# Patient Record
Sex: FEMALE | Race: WHITE | NOT HISPANIC OR LATINO | Employment: OTHER | URBAN - METROPOLITAN AREA
[De-identification: names, ages, dates, MRNs, and addresses within clinical notes are randomized per-mention and may not be internally consistent; named-entity substitution may affect disease eponyms.]

---

## 2017-03-13 ENCOUNTER — PATIENT OUTREACH (OUTPATIENT)
Dept: ADMINISTRATIVE | Facility: HOSPITAL | Age: 62
End: 2017-03-13

## 2017-03-13 RX ORDER — BLOOD-GLUCOSE METER
KIT MISCELLANEOUS
Qty: 100 EACH | Refills: 0 | OUTPATIENT
Start: 2017-03-13

## 2017-07-25 ENCOUNTER — OFFICE VISIT (OUTPATIENT)
Dept: INTERNAL MEDICINE | Facility: CLINIC | Age: 62
End: 2017-07-25
Payer: COMMERCIAL

## 2017-07-25 DIAGNOSIS — E11.9 TYPE 2 DIABETES MELLITUS WITHOUT COMPLICATION, WITHOUT LONG-TERM CURRENT USE OF INSULIN: ICD-10-CM

## 2017-07-25 DIAGNOSIS — I10 ESSENTIAL HYPERTENSION: Primary | ICD-10-CM

## 2017-07-25 PROCEDURE — 4010F ACE/ARB THERAPY RXD/TAKEN: CPT | Mod: S$GLB,,, | Performed by: INTERNAL MEDICINE

## 2017-07-25 PROCEDURE — 99214 OFFICE O/P EST MOD 30 MIN: CPT | Mod: S$GLB,,, | Performed by: INTERNAL MEDICINE

## 2017-07-25 PROCEDURE — 99999 PR PBB SHADOW E&M-EST. PATIENT-LVL III: CPT | Mod: PBBFAC,,, | Performed by: INTERNAL MEDICINE

## 2017-07-25 RX ORDER — CARVEDILOL 12.5 MG/1
12.5 TABLET ORAL 2 TIMES DAILY WITH MEALS
Qty: 60 TABLET | Refills: 6 | Status: SHIPPED | OUTPATIENT
Start: 2017-07-25 | End: 2018-02-21 | Stop reason: SDUPTHER

## 2017-07-26 VITALS
DIASTOLIC BLOOD PRESSURE: 78 MMHG | HEART RATE: 75 BPM | WEIGHT: 264.56 LBS | HEIGHT: 67 IN | BODY MASS INDEX: 41.52 KG/M2 | SYSTOLIC BLOOD PRESSURE: 145 MMHG

## 2017-07-26 NOTE — PROGRESS NOTES
Answers for HPI/ROS submitted by the patient on 7/23/2017   Hypertension  Chronicity: recurrent  Onset: more than 1 year ago  Progression since onset: rapidly improving  Condition status: controlled  anxiety: Yes  blurred vision: No  chest pain: No  headaches: No  malaise/fatigue: Yes  neck pain: No  orthopnea: No  palpitations: No  peripheral edema: No  PND: No  shortness of breath: No  sweats: Yes  Agents associated with hypertension: decongestants, NSAIDs  CAD risks: diabetes mellitus, obesity  Compliance problems: diet, exercise  Improvement on treatment: moderate

## 2017-07-26 NOTE — PROGRESS NOTES
"Subjective:       Patient ID: Maria Alejandra De La Rosa is a 62 y.o. female.    Chief Complaint: Medication Refill   HPI   This is a 62-year-old who presents today for medication refill. She is new to me and reports made appt because she was out of medication.  Patient has been followed by Dr. Bae but then reports her insurance changed and she went elsewhere she recently switched insurance again and [facility] wanted to come in to get a refill of her medications.  Patient reports that she was out of some of her medications but went to see an endocrinologist a few days ago and her medications were filled at that time.except for her carvedilol  She went to see an outlying endocrinologist Luz Marina Marin NP for her diabetes and reports her medications were refilled at that time she has had only been taking her Coreg once daily so she needs a refill of that the rest were recently filled.  She  reports in general her diabetes has been controlled and she takes 1 metformin because she was having trouble with diarrhea patient reports she had recent blood work and will try to have this sent she would rather do that then repeat her blood work at this time she's had it about a week ago.    Review of Systems   Respiratory: Negative for shortness of breath.    Cardiovascular: Negative for chest pain and palpitations.   Musculoskeletal: Negative for neck pain.   Neurological: Negative for headaches.       Objective:     Blood pressure (!) 145/78, pulse 62, height 5' 7" (1.702 m), weight 120 kg (264 lb 8.8 oz).    Physical Exam   Constitutional: No distress.   HENT:   Head: Normocephalic.   Mouth/Throat: Oropharynx is clear and moist.   Eyes: No scleral icterus.   Neck: Neck supple.   Cardiovascular: Normal rate, regular rhythm and normal heart sounds.  Exam reveals no gallop and no friction rub.    No murmur heard.  Pulmonary/Chest: Effort normal and breath sounds normal. No respiratory distress.   Abdominal: Soft. " Bowel sounds are normal. She exhibits no mass. There is no tenderness.   Musculoskeletal: She exhibits no edema.   Feet:   Right Foot:   Protective Sensation: 7 sites tested. 7 sites sensed.   Left Foot:   Protective Sensation: 7 sites tested. 7 sites sensed.   Neurological: She is alert.   Skin: No erythema.   Psychiatric: She has a normal mood and affect.   Vitals reviewed.      Assessment:       1. Essential hypertension    2. Type 2 diabetes mellitus without complication, without long-term current use of insulin        Plan:       Maria Alejandra was seen today for medication refill.    Diagnoses and all orders for this visit:    Essential hypertension  She is on losartan hydrochlorothiazide and Carvedilol  Discussed taking her carvedilol twice daily as prescribed    Type 2 diabetes mellitus without complication, without long-term current use of insulin  She has recently seen her outlying endocrinologist robi santos  And reports her readings were good she will have her labs sent for review       -     carvedilol (COREG) 12.5 MG tablet; Take 1 tablet (12.5 mg total) by mouth 2 (two) times daily with meals  She has her other refills she reports    She will return for physical to establish 3-4 months bring meds at that time for review .  She reports upcoming gyn appt with her conogylogist dr. Garrison at   And follows with outlying optho she will send records for review

## 2017-07-31 DIAGNOSIS — Z12.31 OTHER SCREENING MAMMOGRAM: ICD-10-CM

## 2017-08-04 RX ORDER — LOSARTAN POTASSIUM 100 MG/1
100 TABLET ORAL DAILY
Qty: 90 TABLET | Refills: 1 | Status: SHIPPED | OUTPATIENT
Start: 2017-08-04 | End: 2018-02-01 | Stop reason: SDUPTHER

## 2017-08-04 NOTE — TELEPHONE ENCOUNTER
----- Message from Selena Hamilton sent at 8/4/2017 12:25 PM CDT -----  Contact: self/[phone]  Pt called in regards to getting a Rx for losartan (COZAAR) 100 MG tablet. It was given to her by the Np. She would like to get it from her primary.      [facility] pharmacy\  Please advise

## 2017-08-23 ENCOUNTER — PATIENT MESSAGE (OUTPATIENT)
Dept: ADMINISTRATIVE | Facility: HOSPITAL | Age: 62
End: 2017-08-23

## 2017-11-27 DIAGNOSIS — Z13.5 SCREENING FOR DIABETIC RETINOPATHY: Primary | ICD-10-CM

## 2017-11-28 ENCOUNTER — OFFICE VISIT (OUTPATIENT)
Dept: INTERNAL MEDICINE | Facility: CLINIC | Age: 62
End: 2017-11-28
Payer: COMMERCIAL

## 2017-11-28 VITALS
SYSTOLIC BLOOD PRESSURE: 140 MMHG | DIASTOLIC BLOOD PRESSURE: 78 MMHG | BODY MASS INDEX: 43.15 KG/M2 | HEART RATE: 72 BPM | HEIGHT: 66 IN | WEIGHT: 268.5 LBS

## 2017-11-28 DIAGNOSIS — E11.9 TYPE 2 DIABETES MELLITUS WITHOUT COMPLICATION, WITHOUT LONG-TERM CURRENT USE OF INSULIN: ICD-10-CM

## 2017-11-28 DIAGNOSIS — Z11.59 NEED FOR HEPATITIS C SCREENING TEST: ICD-10-CM

## 2017-11-28 DIAGNOSIS — I10 ESSENTIAL HYPERTENSION: ICD-10-CM

## 2017-11-28 DIAGNOSIS — F32.A DEPRESSION, UNSPECIFIED DEPRESSION TYPE: ICD-10-CM

## 2017-11-28 DIAGNOSIS — Z00.00 ANNUAL PHYSICAL EXAM: Primary | ICD-10-CM

## 2017-11-28 PROCEDURE — 99396 PREV VISIT EST AGE 40-64: CPT | Mod: S$GLB,,, | Performed by: INTERNAL MEDICINE

## 2017-11-28 PROCEDURE — 99999 PR PBB SHADOW E&M-EST. PATIENT-LVL III: CPT | Mod: PBBFAC,,, | Performed by: INTERNAL MEDICINE

## 2017-11-28 RX ORDER — HYDROCHLOROTHIAZIDE 25 MG/1
25 TABLET ORAL DAILY
Qty: 90 TABLET | Refills: 3 | Status: SHIPPED | OUTPATIENT
Start: 2017-11-28 | End: 2018-02-21 | Stop reason: SDUPTHER

## 2017-11-28 RX ORDER — LINAGLIPTIN 5 MG/1
TABLET, FILM COATED ORAL
COMMUNITY
Start: 2017-11-16 | End: 2018-02-01 | Stop reason: SDUPTHER

## 2017-11-28 RX ORDER — MULTIVITAMIN
1 TABLET ORAL DAILY
COMMUNITY
End: 2018-02-01

## 2017-11-28 RX ORDER — CHOLECALCIFEROL (VITAMIN D3) 25 MCG
1000 TABLET ORAL DAILY
COMMUNITY

## 2017-11-28 NOTE — PROGRESS NOTES
Answers for HPI/ROS submitted by the patient on 11/26/2017   activity change: No  unexpected weight change: No  neck pain: No  hearing loss: No  rhinorrhea: No  trouble swallowing: No  eye discharge: No  visual disturbance: No  chest tightness: No  wheezing: No  chest pain: No  palpitations: No  blood in stool: No  constipation: No  vomiting: No  diarrhea: No  polydipsia: No  polyuria: No  difficulty urinating: No  hematuria: No  menstrual problem: No  dysuria: No  joint swelling: No  arthralgias: No  headaches: Yes  weakness: No  confusion: No  dysphoric mood: Yes

## 2017-11-28 NOTE — PROGRESS NOTES
Subjective:       Patient ID: Maria Alejandra De La Rosa is a 62 y.o. female.    Chief Complaint: Annual Exam   this is a 62-year-old who presents today for physical.  Patient reports that she has been doing fairly well she is following with outlying endocrinologist recently for her diabetes but reports she probably won't do that any longer she also has a thyroid doctor that she sees Dr. Rodrigues who placed her on some medications such as vitamin D and a multivitamin and reports she has been feeling better a bit more energy over times.  She was having difficulty with diarrhea with metformin her endocrinologist recently switched her to Cogentin which she is tolerating without difficulty and feels her blood sugars have been pretty good.  She brought her machine that she plans to use to start a more regular exercise program in the near future.  Patient has a history of depression chart shows bipolar she had seen psychiatry in the past but reports got off medications she may feel like getting back in with her psychiatrist she has trouble with mood swings on occasion tearful at times but no suicidal ideation. She got off medications in the past.    HPI  Review of Systems   Constitutional: Negative for activity change and unexpected weight change.   HENT: Negative for hearing loss, rhinorrhea and trouble swallowing.    Eyes: Negative for discharge and visual disturbance.   Respiratory: Negative for chest tightness and wheezing.    Cardiovascular: Negative for chest pain and palpitations.   Gastrointestinal: Negative for blood in stool, constipation, diarrhea and vomiting.   Endocrine: Negative for polydipsia and polyuria.   Genitourinary: Negative for difficulty urinating, dysuria, hematuria and menstrual problem.   Musculoskeletal: Negative for arthralgias, joint swelling and neck pain.   Neurological: Positive for headaches. Negative for weakness.   Psychiatric/Behavioral: Positive for dysphoric mood. Negative for confusion.      "  Objective:     Blood pressure 124/82, pulse 72, height 5' 6" (1.676 m), weight 121.8 kg (268 lb 8.3 oz).    Physical Exam   Constitutional: No distress.   HENT:   Head: Normocephalic.   Mouth/Throat: Oropharynx is clear and moist.   Eyes: No scleral icterus.   Neck: Neck supple.   Cardiovascular: Normal rate, regular rhythm and normal heart sounds.  Exam reveals no gallop and no friction rub.    No murmur heard.  Pulmonary/Chest: Effort normal and breath sounds normal. No respiratory distress.   Breast : normal no masses or tenderness    Abdominal: Soft. Bowel sounds are normal. She exhibits no mass. There is no tenderness.   Musculoskeletal: She exhibits no edema.   Neurological: She is alert.   Skin: No erythema.   Psychiatric: She has a normal mood and affect.   Vitals reviewed.      Assessment:       1. Annual physical exam    2. Essential hypertension    3. Depression, unspecified depression type    4. Need for hepatitis C screening test    5. Type 2 diabetes mellitus without complication, without long-term current use of insulin        Plan:       Maria Alejandra was seen today for annual exam.    Diagnoses and all orders for this visit:    Annual physical exam  -     Hepatic function panel; Future  -     Lipid panel; Future    Essential hypertension  Blood pressure she will resume her hydrochlorothiazide refill provided    Depression, unspecified depression type  History of possible bipolar follow up psychiatry recommended   -     Ambulatory consult to Psychiatry    Need for hepatitis C screening test  -     Hepatitis C antibody; Future    Type 2 diabetes mellitus without complication, without long-term current use of insulin  She is recently off metformin due to diarrhea and now on Tradjenta tolerating without difficulty reinforced basic diet regular exercise    She has been off of and will resume her medication   -     hydroCHLOROthiazide (HYDRODIURIL) 25 MG tablet; Take 1 tablet (25 mg total) by mouth once " daily.    She brings in her blood work today from [facility] endocrinologist hematoma: A1c 7.6 lipids at that time she would be agreeable to updating blood work  We discussed cholesterol medication she will consider would like to update her blood work first    She is up to date on annual mammogram and gyn at    She has seen [facility]  and will send record   She declined retinal exam and cancelled     [facility] labs reviewed glucose 184, bun cr normal ast38/alt37  hgaic 7.6  rsh 3.47, free t4 1.2 , free t3 2.8 , vit d 80,     Follow-up 3-4 months

## 2017-11-29 DIAGNOSIS — E78.5 HYPERLIPIDEMIA, UNSPECIFIED HYPERLIPIDEMIA TYPE: Primary | ICD-10-CM

## 2017-11-29 RX ORDER — ATORVASTATIN CALCIUM 10 MG/1
10 TABLET, FILM COATED ORAL DAILY
Qty: 30 TABLET | Refills: 6 | Status: SHIPPED | OUTPATIENT
Start: 2017-11-29 | End: 2018-02-01 | Stop reason: SDUPTHER

## 2017-12-13 ENCOUNTER — TELEPHONE (OUTPATIENT)
Dept: INTERNAL MEDICINE | Facility: CLINIC | Age: 62
End: 2017-12-13

## 2017-12-14 NOTE — TELEPHONE ENCOUNTER
----- Message from Caryn Ambrosio MD sent at 11/29/2017  8:35 AM CST -----  Pt called discussed labs lipids  And recommend statin pt agreeable  Risk benefits reviewed plans start atovarastain 10 mg  rx sent plan repeat labs in 10-12 weeks please schedule

## 2018-02-01 ENCOUNTER — OFFICE VISIT (OUTPATIENT)
Dept: INTERNAL MEDICINE | Facility: CLINIC | Age: 63
End: 2018-02-01
Payer: MEDICARE

## 2018-02-01 ENCOUNTER — LAB VISIT (OUTPATIENT)
Dept: LAB | Facility: HOSPITAL | Age: 63
End: 2018-02-01
Attending: INTERNAL MEDICINE
Payer: MEDICARE

## 2018-02-01 VITALS
TEMPERATURE: 99 F | OXYGEN SATURATION: 97 % | WEIGHT: 254.31 LBS | SYSTOLIC BLOOD PRESSURE: 138 MMHG | HEART RATE: 66 BPM | HEIGHT: 66 IN | DIASTOLIC BLOOD PRESSURE: 84 MMHG | BODY MASS INDEX: 40.87 KG/M2

## 2018-02-01 DIAGNOSIS — I15.2 HYPERTENSION ASSOCIATED WITH DIABETES: ICD-10-CM

## 2018-02-01 DIAGNOSIS — E11.69 HYPERLIPIDEMIA ASSOCIATED WITH TYPE 2 DIABETES MELLITUS: ICD-10-CM

## 2018-02-01 DIAGNOSIS — E11.59 HYPERTENSION ASSOCIATED WITH DIABETES: ICD-10-CM

## 2018-02-01 DIAGNOSIS — E66.9 DIABETES MELLITUS TYPE 2 IN OBESE: ICD-10-CM

## 2018-02-01 DIAGNOSIS — E78.5 HYPERLIPIDEMIA, UNSPECIFIED HYPERLIPIDEMIA TYPE: ICD-10-CM

## 2018-02-01 DIAGNOSIS — E11.69 DIABETES MELLITUS TYPE 2 IN OBESE: ICD-10-CM

## 2018-02-01 DIAGNOSIS — E78.5 HYPERLIPIDEMIA ASSOCIATED WITH TYPE 2 DIABETES MELLITUS: ICD-10-CM

## 2018-02-01 LAB
ALBUMIN SERPL BCP-MCNC: 3.8 G/DL
ALP SERPL-CCNC: 113 U/L
ALT SERPL W/O P-5'-P-CCNC: 25 U/L
ANION GAP SERPL CALC-SCNC: 10 MMOL/L
AST SERPL-CCNC: 24 U/L
BILIRUB DIRECT SERPL-MCNC: 0.3 MG/DL
BILIRUB SERPL-MCNC: 0.8 MG/DL
BUN SERPL-MCNC: 15 MG/DL
CALCIUM SERPL-MCNC: 9.7 MG/DL
CHLORIDE SERPL-SCNC: 100 MMOL/L
CHOLEST SERPL-MCNC: 133 MG/DL
CHOLEST/HDLC SERPL: 2.6 {RATIO}
CO2 SERPL-SCNC: 30 MMOL/L
CREAT SERPL-MCNC: 1.2 MG/DL
EST. GFR  (AFRICAN AMERICAN): 55 ML/MIN/1.73 M^2
EST. GFR  (NON AFRICAN AMERICAN): 48 ML/MIN/1.73 M^2
ESTIMATED AVG GLUCOSE: 252 MG/DL
GLUCOSE SERPL-MCNC: 357 MG/DL
HBA1C MFR BLD HPLC: 10.4 %
HDLC SERPL-MCNC: 52 MG/DL
HDLC SERPL: 39.1 %
LDLC SERPL CALC-MCNC: 56.2 MG/DL
NONHDLC SERPL-MCNC: 81 MG/DL
POTASSIUM SERPL-SCNC: 3.8 MMOL/L
PROT SERPL-MCNC: 7.1 G/DL
SODIUM SERPL-SCNC: 140 MMOL/L
TRIGL SERPL-MCNC: 124 MG/DL

## 2018-02-01 PROCEDURE — 36415 COLL VENOUS BLD VENIPUNCTURE: CPT

## 2018-02-01 PROCEDURE — 99214 OFFICE O/P EST MOD 30 MIN: CPT | Mod: S$PBB,,, | Performed by: INTERNAL MEDICINE

## 2018-02-01 PROCEDURE — 80061 LIPID PANEL: CPT

## 2018-02-01 PROCEDURE — 99213 OFFICE O/P EST LOW 20 MIN: CPT | Mod: PBBFAC | Performed by: INTERNAL MEDICINE

## 2018-02-01 PROCEDURE — 99999 PR PBB SHADOW E&M-EST. PATIENT-LVL III: CPT | Mod: PBBFAC,,, | Performed by: INTERNAL MEDICINE

## 2018-02-01 PROCEDURE — 80076 HEPATIC FUNCTION PANEL: CPT

## 2018-02-01 PROCEDURE — 83036 HEMOGLOBIN GLYCOSYLATED A1C: CPT

## 2018-02-01 PROCEDURE — 80048 BASIC METABOLIC PNL TOTAL CA: CPT

## 2018-02-01 RX ORDER — ATORVASTATIN CALCIUM 10 MG/1
10 TABLET, FILM COATED ORAL DAILY
Qty: 30 TABLET | Refills: 0 | Status: SHIPPED | OUTPATIENT
Start: 2018-02-01 | End: 2018-02-26 | Stop reason: SDUPTHER

## 2018-02-01 RX ORDER — ATORVASTATIN CALCIUM 10 MG/1
10 TABLET, FILM COATED ORAL DAILY
Qty: 90 TABLET | Refills: 3 | Status: CANCELLED | OUTPATIENT
Start: 2018-02-01 | End: 2019-02-01

## 2018-02-01 RX ORDER — LOSARTAN POTASSIUM 100 MG/1
100 TABLET ORAL DAILY
Qty: 30 TABLET | Refills: 0 | Status: SHIPPED | OUTPATIENT
Start: 2018-02-01 | End: 2018-02-28 | Stop reason: SDUPTHER

## 2018-02-01 RX ORDER — LINAGLIPTIN 5 MG/1
5 TABLET, FILM COATED ORAL DAILY
Qty: 30 TABLET | Refills: 0 | Status: SHIPPED | OUTPATIENT
Start: 2018-02-01 | End: 2018-02-26 | Stop reason: SDUPTHER

## 2018-02-01 RX ORDER — LINAGLIPTIN 5 MG/1
5 TABLET, FILM COATED ORAL DAILY
Qty: 90 TABLET | Refills: 3 | Status: CANCELLED | OUTPATIENT
Start: 2018-02-01

## 2018-02-01 RX ORDER — GLIMEPIRIDE 2 MG/1
2 TABLET ORAL
Qty: 30 TABLET | Refills: 1 | Status: SHIPPED | OUTPATIENT
Start: 2018-02-01 | End: 2018-03-26 | Stop reason: SDUPTHER

## 2018-02-01 RX ORDER — LOSARTAN POTASSIUM 100 MG/1
100 TABLET ORAL DAILY
Qty: 90 TABLET | Refills: 3 | Status: CANCELLED | OUTPATIENT
Start: 2018-02-01

## 2018-02-01 NOTE — PROGRESS NOTES
Subjective:       Patient ID: Maria Alejandra De La Rosa is a 62 y.o. female.    Chief Complaint: Follow-up (elevated blood count)                           Uncontrolled DM  HPI   Pt of Dr Ambrosio.    Dx with diabetes 4-5 years ago.  Attended diabetes education.  She had been followed by Luz Marina Marin NP in October.  Takes tradjenta since November..  Metformin stopped due to diarrhea in October.  Was on ER form  Sugars up from 190 to 400's.  Diet hasn't changed.  Wt down 10 lbs from July.  BMI 41.  Last aic was 6.4 12/2015.  No recent fever, cp, sob.    Review of Systems   Constitutional: Negative for fever and unexpected weight change.   HENT: Negative for congestion and postnasal drip.    Eyes: Negative for pain, discharge and visual disturbance.   Respiratory: Negative for cough, chest tightness, shortness of breath and wheezing.    Cardiovascular: Negative for chest pain and leg swelling.   Gastrointestinal: Negative for abdominal pain, constipation, diarrhea and nausea.   Genitourinary: Negative for difficulty urinating, dysuria and hematuria.   Skin: Negative for rash.   Neurological: Negative for headaches.   Psychiatric/Behavioral: Negative for dysphoric mood and sleep disturbance. The patient is not nervous/anxious.        Objective:      Physical Exam   Constitutional: She is oriented to person, place, and time. She appears well-developed and well-nourished.   Eyes: No scleral icterus.   Neck: No JVD present. No thyromegaly present.   Cardiovascular: Normal rate, regular rhythm and normal heart sounds.    Pulmonary/Chest: Effort normal and breath sounds normal. No respiratory distress. She has no wheezes. She has no rales.   Abdominal: Soft. She exhibits no mass. There is no tenderness.   Musculoskeletal: She exhibits no edema.   Neurological: She is alert and oriented to person, place, and time.   Psychiatric: She has a normal mood and affect. Her behavior is normal.       Assessment:       1. Uncontrolled type 2  "diabetes mellitus with complication, without long-term current use of insulin    2. Diabetes mellitus type 2 in obese    3. Hypertension associated with diabetes    4. Hyperlipidemia associated with type 2 diabetes mellitus    5. BMI 40.0-44.9, adult        Plan:       Maria Alejandra was seen today for follow-up.    Diagnoses and all orders for this visit:    Uncontrolled type 2 diabetes mellitus with complication, without long-term current use of insulin    Diabetes mellitus type 2 in obese    Hypertension associated with diabetes    Hyperlipidemia associated with type 2 diabetes mellitus    BMI 40.0-44.9, adult    Other orders  -     glimepiride (AMARYL) 2 MG tablet; Take 1 tablet (2 mg total) by mouth before breakfast.  -     blood sugar diagnostic (FREESTYLE LITE STRIPS) Strp; USE  STRIP TO CHECK GLUCOSE ONCE DAILY AS DIRECTED  -     atorvastatin (LIPITOR) 10 MG tablet; Take 1 tablet (10 mg total) by mouth once daily.  -     TRADJENTA 5 mg Tab tablet; Take 1 tablet (5 mg total) by mouth once daily.  -     losartan (COZAAR) 100 MG tablet; Take 1 tablet (100 mg total) by mouth once daily.       She declines injections of any sort.  "would rather die than inject myself"       Consider Invokana.     Diet and exercise!  F/u Dr Ambrosio  "

## 2018-02-02 ENCOUNTER — TELEPHONE (OUTPATIENT)
Dept: INTERNAL MEDICINE | Facility: CLINIC | Age: 63
End: 2018-02-02

## 2018-02-02 NOTE — TELEPHONE ENCOUNTER
----- Message from Stacey Lau sent at 2/2/2018 10:16 AM CST -----  Contact: Patient [phone]  Patient is returning a missed call.    Please call and advise.    Thank you

## 2018-02-02 NOTE — TELEPHONE ENCOUNTER
Called and spoke with pt reviewed her   Recent labs and plan going forward  She has had impromvent in her bs  Declined diabetic education  Will call if remains elevated  Reinforced  dietary measures continue statin  She will call/ginny sooner appt if number remain high

## 2018-02-07 ENCOUNTER — TELEPHONE (OUTPATIENT)
Dept: INTERNAL MEDICINE | Facility: CLINIC | Age: 63
End: 2018-02-07

## 2018-02-07 NOTE — TELEPHONE ENCOUNTER
----- Message from Gricelda Linares sent at 2/6/2018  2:12 PM CST -----  Contact: Self  Pt is calling to speak with Staff regarding prescription refills for all medications, via mail order.    She can be reached at [phone].    Thank you.

## 2018-02-21 RX ORDER — CARVEDILOL 12.5 MG/1
12.5 TABLET ORAL 2 TIMES DAILY WITH MEALS
Qty: 60 TABLET | Refills: 6 | Status: SHIPPED | OUTPATIENT
Start: 2018-02-21 | End: 2018-03-28 | Stop reason: SDUPTHER

## 2018-02-21 RX ORDER — HYDROCHLOROTHIAZIDE 25 MG/1
25 TABLET ORAL DAILY
Qty: 90 TABLET | Refills: 3 | Status: SHIPPED | OUTPATIENT
Start: 2018-02-21 | End: 2018-03-28 | Stop reason: SDUPTHER

## 2018-02-21 NOTE — TELEPHONE ENCOUNTER
Spoke w pt; states that she needs rx carvedilol and hydrochlorothiazide refilled. Pt states that she has been out of medication for 3 days.     Please advise.

## 2018-02-26 RX ORDER — LINAGLIPTIN 5 MG/1
5 TABLET, FILM COATED ORAL DAILY
Qty: 30 TABLET | Refills: 6 | Status: SHIPPED | OUTPATIENT
Start: 2018-02-26 | End: 2018-03-28 | Stop reason: SDUPTHER

## 2018-02-26 RX ORDER — ATORVASTATIN CALCIUM 10 MG/1
10 TABLET, FILM COATED ORAL DAILY
Qty: 30 TABLET | Refills: 6 | Status: SHIPPED | OUTPATIENT
Start: 2018-02-26 | End: 2018-03-28 | Stop reason: SDUPTHER

## 2018-02-28 RX ORDER — LOSARTAN POTASSIUM 100 MG/1
100 TABLET ORAL DAILY
Qty: 30 TABLET | Refills: 4 | Status: SHIPPED | OUTPATIENT
Start: 2018-02-28 | End: 2018-03-28 | Stop reason: SDUPTHER

## 2018-03-26 RX ORDER — GLIMEPIRIDE 2 MG/1
2 TABLET ORAL
Qty: 30 TABLET | Refills: 1 | Status: SHIPPED | OUTPATIENT
Start: 2018-03-26 | End: 2018-03-28 | Stop reason: SDUPTHER

## 2018-03-28 ENCOUNTER — CLINICAL SUPPORT (OUTPATIENT)
Dept: INTERNAL MEDICINE | Facility: CLINIC | Age: 63
End: 2018-03-28
Payer: MEDICARE

## 2018-03-28 ENCOUNTER — OFFICE VISIT (OUTPATIENT)
Dept: INTERNAL MEDICINE | Facility: CLINIC | Age: 63
End: 2018-03-28
Payer: MEDICARE

## 2018-03-28 VITALS
WEIGHT: 255.94 LBS | HEART RATE: 64 BPM | SYSTOLIC BLOOD PRESSURE: 114 MMHG | HEIGHT: 66 IN | BODY MASS INDEX: 41.13 KG/M2 | DIASTOLIC BLOOD PRESSURE: 84 MMHG

## 2018-03-28 DIAGNOSIS — Z23 NEED FOR VACCINATION AGAINST STREPTOCOCCUS PNEUMONIAE USING PNEUMOCOCCAL CONJUGATE VACCINE 13: Primary | ICD-10-CM

## 2018-03-28 DIAGNOSIS — E78.5 HYPERLIPIDEMIA ASSOCIATED WITH TYPE 2 DIABETES MELLITUS: ICD-10-CM

## 2018-03-28 DIAGNOSIS — E11.69 HYPERLIPIDEMIA ASSOCIATED WITH TYPE 2 DIABETES MELLITUS: ICD-10-CM

## 2018-03-28 DIAGNOSIS — E11.59 HYPERTENSION ASSOCIATED WITH DIABETES: ICD-10-CM

## 2018-03-28 DIAGNOSIS — I15.2 HYPERTENSION ASSOCIATED WITH DIABETES: ICD-10-CM

## 2018-03-28 DIAGNOSIS — E11.9 TYPE 2 DIABETES MELLITUS WITHOUT COMPLICATION, WITHOUT LONG-TERM CURRENT USE OF INSULIN: Primary | ICD-10-CM

## 2018-03-28 PROCEDURE — 99999 PR PBB SHADOW E&M-EST. PATIENT-LVL III: CPT | Mod: PBBFAC,,, | Performed by: INTERNAL MEDICINE

## 2018-03-28 PROCEDURE — 99214 OFFICE O/P EST MOD 30 MIN: CPT | Mod: S$PBB,,, | Performed by: INTERNAL MEDICINE

## 2018-03-28 PROCEDURE — 99213 OFFICE O/P EST LOW 20 MIN: CPT | Mod: PBBFAC,25 | Performed by: INTERNAL MEDICINE

## 2018-03-28 PROCEDURE — G0009 ADMIN PNEUMOCOCCAL VACCINE: HCPCS | Mod: PBBFAC

## 2018-03-28 RX ORDER — GLIMEPIRIDE 2 MG/1
2 TABLET ORAL
Qty: 90 TABLET | Refills: 3 | Status: SHIPPED | OUTPATIENT
Start: 2018-03-28 | End: 2019-02-01 | Stop reason: SDUPTHER

## 2018-03-28 RX ORDER — CARVEDILOL 12.5 MG/1
12.5 TABLET ORAL 2 TIMES DAILY WITH MEALS
Qty: 180 TABLET | Refills: 3 | Status: SHIPPED | OUTPATIENT
Start: 2018-03-28 | End: 2019-07-01 | Stop reason: SDUPTHER

## 2018-03-28 RX ORDER — LOSARTAN POTASSIUM 100 MG/1
100 TABLET ORAL DAILY
Qty: 90 TABLET | Refills: 3 | Status: SHIPPED | OUTPATIENT
Start: 2018-03-28 | End: 2019-04-02 | Stop reason: SDUPTHER

## 2018-03-28 RX ORDER — HYDROCHLOROTHIAZIDE 25 MG/1
25 TABLET ORAL DAILY
Qty: 90 TABLET | Refills: 3 | Status: SHIPPED | OUTPATIENT
Start: 2018-03-28 | End: 2019-05-10 | Stop reason: SDUPTHER

## 2018-03-28 RX ORDER — ATORVASTATIN CALCIUM 10 MG/1
10 TABLET, FILM COATED ORAL DAILY
Qty: 90 TABLET | Refills: 3 | Status: SHIPPED | OUTPATIENT
Start: 2018-03-28 | End: 2019-03-21 | Stop reason: SDUPTHER

## 2018-03-28 NOTE — PROGRESS NOTES
Answers for HPI/ROS submitted by the patient on 3/27/2018   activity change: Yes  unexpected weight change: No  neck pain: No  hearing loss: No  rhinorrhea: No  trouble swallowing: No  eye discharge: No  visual disturbance: No  chest tightness: No  wheezing: No  chest pain: No  palpitations: No  blood in stool: No  constipation: No  vomiting: No  diarrhea: No  polydipsia: No  polyuria: No  difficulty urinating: No  hematuria: No  menstrual problem: No  dysuria: No  joint swelling: No  arthralgias: No  headaches: No  weakness: No  confusion: No  dysphoric mood: Yes

## 2018-03-28 NOTE — PROGRESS NOTES
"Subjective:       Patient ID: Maria Alejandra De La Rosa is a 63 y.o. female.    Chief Complaint: Follow-up   this is a 63-year-old who presents today for follow-up she has been doing fairly well with her blood sugar control but was not tolerating metformin she was switched to Tradjenta by [facility] endocrinology and was taking that but really wasn't watching what she ate checking her diet regularly and her blood sugars went up with her last visit her A1c went up from 6.4-10.4.  She now has been really working on improved dietary measures she is back to exercising walking daily and really watching her carbohydrates counting them a little bit more like she used to.  She has been taking her regimen without difficulty and back on all her blood pressure medications as well.  Weight is coming down about 18 pounds since her last visit.She continues to have difficulty with situational stress at times previously been on medications and does plan to follow back up with psychiatry.    HPI  Review of Systems   Constitutional: Positive for activity change. Negative for unexpected weight change.   HENT: Negative for hearing loss, rhinorrhea and trouble swallowing.    Eyes: Negative for discharge and visual disturbance.   Respiratory: Negative for chest tightness and wheezing.    Cardiovascular: Negative for chest pain and palpitations.   Gastrointestinal: Negative for blood in stool, constipation, diarrhea and vomiting.   Endocrine: Negative for polydipsia and polyuria.   Genitourinary: Negative for difficulty urinating, dysuria, hematuria and menstrual problem.   Musculoskeletal: Negative for arthralgias, joint swelling and neck pain.   Neurological: Negative for weakness and headaches.   Psychiatric/Behavioral: Positive for dysphoric mood. Negative for confusion.       Objective:     Blood pressure 114/84, pulse 64, height 5' 6" (1.676 m), weight 116.1 kg (255 lb 15.3 oz).    Physical Exam   Constitutional: No distress.   HENT:   Head: " Normocephalic.   Mouth/Throat: Oropharynx is clear and moist.   Eyes: No scleral icterus.   Neck: Neck supple.   Cardiovascular: Normal rate, regular rhythm and normal heart sounds.  Exam reveals no gallop and no friction rub.    No murmur heard.  Pulmonary/Chest: Effort normal and breath sounds normal. No respiratory distress.   Abdominal: Soft. Bowel sounds are normal. She exhibits no mass. There is no tenderness.   Musculoskeletal: She exhibits no edema.   Neurological: She is alert.   Skin: No erythema.   Vitals reviewed.      Assessment:       1. Type 2 diabetes mellitus without complication, without long-term current use of insulin    2. Hypertension associated with diabetes    3. Hyperlipidemia associated with type 2 diabetes mellitus        Plan:       Maria Alejandra was seen today for follow-up.    Diagnoses and all orders for this visit:    Type 2 diabetes mellitus without complication, without long-term current use of insulin  -     Ambulatory consult to Optometry  -     Basic metabolic panel; Future  -     Hemoglobin A1c; Future    Hypertension associated with diabetes blood pressure acceptable today seems improved overall  Blood pressure acceptable continue current medicine      Hyperlipidemia associated with type 2 diabetes mellitus  Tolerating atorvastatin       she plans to switch to mail order pharmacy through [facility]  And would like prescriptions sent there for refills   -     carvedilol (COREG) 12.5 MG tablet; Take 1 tablet (12.5 mg total) by mouth 2 (two) times daily with meals.  -     atorvastatin (LIPITOR) 10 MG tablet; Take 1 tablet (10 mg total) by mouth once daily.  -     glimepiride (AMARYL) 2 MG tablet; Take 1 tablet (2 mg total) by mouth before breakfast.  -     losartan (COZAAR) 100 MG tablet; Take 1 tablet (100 mg total) by mouth once daily.  -     hydroCHLOROthiazide (HYDRODIURIL) 25 MG tablet; Take 1 tablet (25 mg total) by mouth once daily.  -     linagliptin (TRADJENTA) 5 mg Tab tablet; Take 1  tablet (5 mg total) by mouth once daily.    We'll continue current course and review her blood work see if her numbers have improved she has lost some significant weight in doing well with diet exercise and improved dietary measures again and will continue to do so    She is due for an annual exam coming up soon and plans to schedule here at [facility]    She continues to follow with her outlying gynecologist and up to date on mammogram Pap smear and also had an outlying bone density which she will try to bring    Encouraged psychiatry follow up    Follow-up 3 months call with elevations or concerns

## 2018-04-05 RX ORDER — BLOOD-GLUCOSE METER
EACH MISCELLANEOUS
Qty: 100 STRIP | Refills: 4 | Status: SHIPPED | OUTPATIENT
Start: 2018-04-05 | End: 2018-06-06 | Stop reason: SDUPTHER

## 2018-04-12 DIAGNOSIS — E11.8 TYPE 2 DIABETES MELLITUS WITH COMPLICATION, WITHOUT LONG-TERM CURRENT USE OF INSULIN: Primary | ICD-10-CM

## 2018-04-16 ENCOUNTER — OFFICE VISIT (OUTPATIENT)
Dept: OPTOMETRY | Facility: CLINIC | Age: 63
End: 2018-04-16
Payer: MEDICARE

## 2018-04-16 DIAGNOSIS — H25.13 NUCLEAR SCLEROSIS OF BOTH EYES: ICD-10-CM

## 2018-04-16 DIAGNOSIS — E11.9 TYPE 2 DIABETES MELLITUS WITHOUT OPHTHALMIC MANIFESTATIONS: Primary | ICD-10-CM

## 2018-04-16 DIAGNOSIS — H52.03 HYPEROPIA WITH PRESBYOPIA OF BOTH EYES: ICD-10-CM

## 2018-04-16 DIAGNOSIS — H52.4 HYPEROPIA WITH PRESBYOPIA OF BOTH EYES: ICD-10-CM

## 2018-04-16 PROCEDURE — 92004 COMPRE OPH EXAM NEW PT 1/>: CPT | Mod: S$PBB,,, | Performed by: OPTOMETRIST

## 2018-04-16 PROCEDURE — 99212 OFFICE O/P EST SF 10 MIN: CPT | Mod: PBBFAC | Performed by: OPTOMETRIST

## 2018-04-16 PROCEDURE — 99999 PR PBB SHADOW E&M-EST. PATIENT-LVL II: CPT | Mod: PBBFAC,,, | Performed by: OPTOMETRIST

## 2018-04-16 NOTE — PROGRESS NOTES
HPI     Patient's last dilated exam was: 3/2017 by outside provider, does not   remember his name     Pt here for diabetic eye exam. Blood sugar was last checked a few days   ago, was 171. Denies changes in vision. Glasses are 1 year old. Patient   denies flashes, floaters, pain and double vision. Not using any gtts. C/O   excessive tearing, says she is crying from emotional distress, not dry   eyes.      Hemoglobin A1C       Date                     Value               Ref Range             Status                03/28/2018               7.1 (H)             4.0 - 5.6 %           Final                 02/01/2018               10.4 (H)            4.0 - 5.6 %           Final                  12/03/2015               6.4 (H)             4.5 - 6.2 %           Final            ----------      Last edited by Cyndie Pedroza, PCT on 4/16/2018  1:32 PM.   (History)            Assessment /Plan     For exam results, see Encounter Report.    Type 2 diabetes mellitus without ophthalmic manifestations    Nuclear sclerosis of both eyes    Hyperopia with presbyopia of both eyes            1.  No retinopathy--monitor yearly.   Educated pt eye health correlates with blood sugar control.    2.  Educated on cataracts and affects on vision.  Monitor.  3.  Continue w/ current rx

## 2018-04-16 NOTE — LETTER
April 16, 2018      Caryn Ambrosio MD  [address]  [facility] [address]           [facility]  [address]  [facility] [address]  Phone: [phone]  Fax: [phone]          Patient: Maria Alejandra De La Rosa   MR Number: [id number]   YOB: 1955   Date of Visit: 4/16/2018       Dear Dr. Caryn Ambrosio:    Thank you for referring Maria Alejandra De La Rosa to me for evaluation. Attached you will find relevant portions of my assessment and plan of care.    If you have questions, please do not hesitate to call me. I look forward to following Maria Alejandra De La Rosa along with you.    Sincerely,    Liz Puente, OD    Enclosure  CC:  No Recipients    If you would like to receive this communication electronically, please contact [email or web address] or [phone] to request more information on [employer] access.    For providers and/or their staff who would like to refer a patient to [facility], please contact us through our one-stop-shop provider referral line, Critical access hospitalierge, at [phone].    If you feel you have received this communication in error or would no longer like to receive these types of communications, please e-mail [email or web address]

## 2018-06-06 ENCOUNTER — TELEPHONE (OUTPATIENT)
Dept: INTERNAL MEDICINE | Facility: CLINIC | Age: 63
End: 2018-06-06

## 2018-06-06 NOTE — TELEPHONE ENCOUNTER
----- Message from Yuan Deshpande sent at 6/6/2018 11:09 AM CDT -----  Contact: EPIFANIO CASTILLO [phone]   Stating request forTest Strips where sent to pharmacy, stating is needing Diagnosis Code on the Script or retype with the Code and resend to pharmacy, stating patient has Medicaid and is needing the Diagnostic Code or request. [facility] Contact [phone]    Please call and advise  Thank you

## 2018-10-10 DIAGNOSIS — E11.9 TYPE 2 DIABETES MELLITUS WITHOUT COMPLICATION: ICD-10-CM

## 2018-10-31 ENCOUNTER — NURSE TRIAGE (OUTPATIENT)
Dept: ADMINISTRATIVE | Facility: CLINIC | Age: 63
End: 2018-10-31

## 2018-10-31 ENCOUNTER — OFFICE VISIT (OUTPATIENT)
Dept: INTERNAL MEDICINE | Facility: CLINIC | Age: 63
End: 2018-10-31
Payer: MEDICARE

## 2018-10-31 ENCOUNTER — LAB VISIT (OUTPATIENT)
Dept: LAB | Facility: HOSPITAL | Age: 63
End: 2018-10-31
Payer: MEDICARE

## 2018-10-31 VITALS
OXYGEN SATURATION: 98 % | BODY MASS INDEX: 42.98 KG/M2 | SYSTOLIC BLOOD PRESSURE: 160 MMHG | HEART RATE: 62 BPM | DIASTOLIC BLOOD PRESSURE: 79 MMHG | WEIGHT: 267.44 LBS | HEIGHT: 66 IN

## 2018-10-31 DIAGNOSIS — E78.5 HYPERLIPIDEMIA ASSOCIATED WITH TYPE 2 DIABETES MELLITUS: ICD-10-CM

## 2018-10-31 DIAGNOSIS — I15.2 HYPERTENSION ASSOCIATED WITH DIABETES: ICD-10-CM

## 2018-10-31 DIAGNOSIS — E11.69 HYPERLIPIDEMIA ASSOCIATED WITH TYPE 2 DIABETES MELLITUS: ICD-10-CM

## 2018-10-31 DIAGNOSIS — R42 DIZZINESS: ICD-10-CM

## 2018-10-31 DIAGNOSIS — R42 VERTIGO: Primary | ICD-10-CM

## 2018-10-31 DIAGNOSIS — E11.69 DIABETES MELLITUS TYPE 2 IN OBESE: ICD-10-CM

## 2018-10-31 DIAGNOSIS — E66.9 OBESITY, UNSPECIFIED CLASSIFICATION, UNSPECIFIED OBESITY TYPE, UNSPECIFIED WHETHER SERIOUS COMORBIDITY PRESENT: ICD-10-CM

## 2018-10-31 DIAGNOSIS — E11.59 HYPERTENSION ASSOCIATED WITH DIABETES: ICD-10-CM

## 2018-10-31 DIAGNOSIS — E66.9 DIABETES MELLITUS TYPE 2 IN OBESE: ICD-10-CM

## 2018-10-31 LAB
ALBUMIN SERPL BCP-MCNC: 3.6 G/DL
ALP SERPL-CCNC: 120 U/L
ALT SERPL W/O P-5'-P-CCNC: 18 U/L
ANION GAP SERPL CALC-SCNC: 6 MMOL/L
AST SERPL-CCNC: 20 U/L
BASOPHILS # BLD AUTO: 0.03 K/UL
BASOPHILS NFR BLD: 0.5 %
BILIRUB SERPL-MCNC: 0.8 MG/DL
BUN SERPL-MCNC: 13 MG/DL
CALCIUM SERPL-MCNC: 10.1 MG/DL
CHLORIDE SERPL-SCNC: 103 MMOL/L
CO2 SERPL-SCNC: 30 MMOL/L
CREAT SERPL-MCNC: 1.1 MG/DL
DIFFERENTIAL METHOD: NORMAL
EOSINOPHIL # BLD AUTO: 0.1 K/UL
EOSINOPHIL NFR BLD: 2.3 %
ERYTHROCYTE [DISTWIDTH] IN BLOOD BY AUTOMATED COUNT: 13.2 %
EST. GFR  (AFRICAN AMERICAN): >60 ML/MIN/1.73 M^2
EST. GFR  (NON AFRICAN AMERICAN): 52.8 ML/MIN/1.73 M^2
GLUCOSE SERPL-MCNC: 260 MG/DL
HCT VFR BLD AUTO: 40.9 %
HGB BLD-MCNC: 13.2 G/DL
LYMPHOCYTES # BLD AUTO: 1.5 K/UL
LYMPHOCYTES NFR BLD: 26.7 %
MCH RBC QN AUTO: 29.7 PG
MCHC RBC AUTO-ENTMCNC: 32.3 G/DL
MCV RBC AUTO: 92 FL
MONOCYTES # BLD AUTO: 0.5 K/UL
MONOCYTES NFR BLD: 8.5 %
NEUTROPHILS # BLD AUTO: 3.6 K/UL
NEUTROPHILS NFR BLD: 61.8 %
PLATELET # BLD AUTO: 227 K/UL
PMV BLD AUTO: 10.1 FL
POTASSIUM SERPL-SCNC: 4.5 MMOL/L
PROT SERPL-MCNC: 7.1 G/DL
RBC # BLD AUTO: 4.44 M/UL
SODIUM SERPL-SCNC: 139 MMOL/L
WBC # BLD AUTO: 5.76 K/UL

## 2018-10-31 PROCEDURE — 99999 PR PBB SHADOW E&M-EST. PATIENT-LVL V: CPT | Mod: PBBFAC,,, | Performed by: NURSE PRACTITIONER

## 2018-10-31 PROCEDURE — 99214 OFFICE O/P EST MOD 30 MIN: CPT | Mod: S$PBB,,, | Performed by: NURSE PRACTITIONER

## 2018-10-31 PROCEDURE — 99215 OFFICE O/P EST HI 40 MIN: CPT | Mod: PBBFAC,25 | Performed by: NURSE PRACTITIONER

## 2018-10-31 PROCEDURE — 93010 ELECTROCARDIOGRAM REPORT: CPT | Mod: ,,, | Performed by: INTERNAL MEDICINE

## 2018-10-31 PROCEDURE — 36415 COLL VENOUS BLD VENIPUNCTURE: CPT

## 2018-10-31 PROCEDURE — 85025 COMPLETE CBC W/AUTO DIFF WBC: CPT

## 2018-10-31 PROCEDURE — 80053 COMPREHEN METABOLIC PANEL: CPT

## 2018-10-31 PROCEDURE — 93005 ELECTROCARDIOGRAM TRACING: CPT | Mod: PBBFAC | Performed by: NURSE PRACTITIONER

## 2018-10-31 RX ORDER — MECLIZINE HCL 12.5 MG 12.5 MG/1
12.5 TABLET ORAL 3 TIMES DAILY PRN
Qty: 40 TABLET | Refills: 0 | Status: SHIPPED | OUTPATIENT
Start: 2018-10-31 | End: 2018-10-31 | Stop reason: SDUPTHER

## 2018-10-31 RX ORDER — MECLIZINE HCL 12.5 MG 12.5 MG/1
12.5 TABLET ORAL 3 TIMES DAILY PRN
Qty: 40 TABLET | Refills: 0 | Status: SHIPPED | OUTPATIENT
Start: 2018-10-31 | End: 2019-04-30

## 2018-10-31 NOTE — PATIENT INSTRUCTIONS
Managing Dizziness (Vertigo) with Medicines    Although medicines can't cure your problem, they can help control symptoms. Your doctor may prescribe medicines for a few weeks and then taper them off. Always take your medicine as prescribed. Never share your medicine with others.  Contact your healthcare provider right away if you have side effects from your medicines.   How medicines can help  · Treat infection or inflammation. If you have an infection caused by bacteria, your doctor can prescribe antibiotics.  · Limit conflicting balance signals. These medicines are often in pill form.  · Ease nausea. Suppositories, pills, or shots can reduce vomiting.  · Reduce pressure in the canals. Diuretics can be used to treat Meniere's disease. These medicines help your body get rid of extra fluid.  · Ease other symptoms. Other medicines can help ease depression and anxiety caused by living with dizziness or fainting.  Date Last Reviewed: 11/1/2016  © 6944-2688 [employer]. [address] [place] [address]. All rights reserved. This information is not intended as a substitute for professional medical care. Always follow your healthcare professional's instructions.        Benign Paroxysmal Positional Vertigo     Your health care provider may move your head in certain ways to treat your BPPV.     Benign paroxysmal positional vertigo (BPPV) is a problem with the inner ear. The inner ear contains the vestibular system. This system is what helps you keep your balance. BPPV causes a feeling of spinning. It is a common problem of the vestibular system.  Understanding the vestibular system  The vestibular system of the ear is made up of very tiny parts. They include the utricle, saccule, and semicircular canals. The utricle is a tiny organ that contains calcium crystals. In some people, the crystals can move into the semicircular canals. When this happens, the system no longer works as it should. This causes  BPPV. Benign means it is not life-threatening. Paroxysmal means it happens suddenly. Positional means that it happens when you move your head. Vertigo is a feeling of spinning.  What causes BPPV?  Causes include injury to your head or neck. Other problems with the vestibular system may cause BPPV. In many people, the cause of BPPV is not known.  Symptoms of BPPV  You many have repeated feelings of spinning (vertigo). The vertigo usually lasts less than 1 minute. Some movements, suchas rolling over in bed, can bring on vertigo.  Diagnosing BPPV  Your primary health care provider may diagnose and treat your BPPV. Or you may see an ear, nose, and throat doctor (otolaryngologist). In some cases, you may see a nervous system doctor (neurologist).  The health care provider will ask about your symptoms and your medical history. He or she will examine you. You may have hearing and balance tests. As part of the exam, your health care provider may have you move your head and body in certain ways. If you have BPPV, the movements can bring on vertigo. Your provider will also look for abnormal movements of your eyes. You may have other tests to check your vestibular or nervous systems.  Treatment for BPPV  Your health care provider may try to move the calcium crystals. This is done by having you move your head and neck in certain ways. This treatment is safe and often works well. You may also be told to do these movements at home. You may still have vertigo for a few weeks. Your health care provider will recheck your symptoms, usually in about a month. Special physical therapy may also be part of treatment. In rare cases surgery may be needed for BPPV that does not go away.     When to call the health care provider  Call your health care provider right away if you have any of these:  · Symptoms that do not go away with treatment  · Symptoms that get worse  · New symptoms   Date Last Reviewed: 3/19/2015  © 2555-8416 [employer]. [address] [place] [address], [place] [address]. All rights reserved. This information is not intended as a substitute for professional medical care. Always follow your healthcare professional's instructions.

## 2018-10-31 NOTE — TELEPHONE ENCOUNTER
"    Reason for Disposition   Lightheadedness (dizziness) present now, after 2 hours of rest and fluids    Protocols used: ST DIZZINESS-A-OH      Maria Alejandra became dizzy when on the commode last night.  It was transient and lasted a short while.  Today, it "comes and goes."  She states it is not a sensation of the room spinning, but says "it feels like it's within me, not outside me,"  She also reports that she has a "pinching feeling" under her right breast.  She states it is a muscle pain on the chest wall, though, and says it is not within the chest.  No pains to shoulders, arms, neck, jaw, back or abdomen. She does not take home BPs and does not know her pulse or how to take it.  She says she feels like it is beating faster than usual today. Of note, she does not hydrate well; states she had a glass of water with her pills this morning, but nothing since then.  Per [facility] triage protocol, recommend she be seen now in clinic.  Appointment made with Elizabeth Munoz at 1330.  Message to Caryn Ambrosio MD, pcp. Please contact caller directly with any additional care advice.      "

## 2018-10-31 NOTE — PROGRESS NOTES
"INTERNAL MEDICINE URGENT CARE NOTE    CHIEF COMPLAINT     Chief Complaint   Patient presents with    Dizziness       HPI     Maria Alejandra De La Rosa is a 63 y.o. female with HTN, HLD, DM and obesity who presents for an urgent visit today.  She is an established pt of Dr Ambrosio     Here with c/o dizziness- started last night when having a BM. [place] like the room was rocking. Felt flush and like she was going to pass out. CBG reading 213. Feeling passed.   Similar feeling returned this morning. Took medication this morning and CBG reading was 213.   Walking down the robbins was bumping into the walls. "Like my equilibrium is off".     At present does not feel dizzy or pre-syncopal.     During episode- felt anxious and "like I was coming out of my skin" and Pain in the right breast "pinching". Lasted less than 5 minutes.   Denies SOB, heart racing.       Past Medical History:  Past Medical History:   Diagnosis Date    Bipolar 1 disorder     Cataract     Diabetes mellitus type 2 in obese 2/1/2018    Diabetes mellitus, type 2     DJD (degenerative joint disease)     Dysmetabolic syndrome     Hyperglycemia     Hypertension     Lumbar stenosis     Sleep apnea        Home Medications:  Prior to Admission medications    Medication Sig Start Date End Date Taking? Authorizing Provider   atorvastatin (LIPITOR) 10 MG tablet Take 1 tablet (10 mg total) by mouth once daily. 3/28/18 3/28/19  Caryn Ambrosio MD   blood sugar diagnostic (ONETOUCH VERIO) Strp USE STRIP TO CHECK GLUCOSE ONCE DAILY AS DIRECTED Type 2 diabetes E11.9 6/6/18   Caryn Ambrosio MD   carvedilol (COREG) 12.5 MG tablet Take 1 tablet (12.5 mg total) by mouth 2 (two) times daily with meals. 3/28/18   Caryn Ambrosio MD   glimepiride (AMARYL) 2 MG tablet Take 1 tablet (2 mg total) by mouth before breakfast. 3/28/18 3/28/19  Caryn Ambrosio MD   hydroCHLOROthiazide (HYDRODIURIL) 25 MG tablet Take 1 tablet (25 mg total) by mouth " once daily. 3/28/18   Caryn Ambrosio MD   linagliptin (TRADJENTA) 5 mg Tab tablet Take 1 tablet (5 mg total) by mouth once daily. 3/28/18   Caryn Ambrosio MD   losartan (COZAAR) 100 MG tablet Take 1 tablet (100 mg total) by mouth once daily. 3/28/18   Caryn Ambrosio MD   multivitamin capsule Take 1 capsule by mouth once daily.    Historical Provider, MD   ONETOUCH DELICA LANCETS 33 gauge Misc Inject 1 lancet into the skin 2 (two) times daily. 4/12/18   Leida Valerio MD   vitamin D 1000 units Tab Take 1,000 Units by mouth once daily.    Historical Provider, MD       Review of Systems:  Review of Systems   Constitutional: Negative for chills, fatigue and fever.   HENT: Negative for congestion, ear discharge, ear pain, postnasal drip, rhinorrhea, sinus pressure, sinus pain, sneezing, sore throat and tinnitus.    Respiratory: Negative for cough, shortness of breath and wheezing.    Cardiovascular: Positive for chest pain (breast pain). Negative for palpitations and leg swelling.   Gastrointestinal: Negative for abdominal pain, constipation, diarrhea, nausea and vomiting.   Skin: Negative for rash.   Neurological: Positive for dizziness and light-headedness. Negative for syncope, speech difficulty, weakness, numbness and headaches.   Psychiatric/Behavioral: The patient is nervous/anxious.        Health Maintainence:   Immunizations:  Health Maintenance       Date Due Completion Date    TETANUS VACCINE 02/07/1971 ---    DEXA SCAN 02/07/1993 ---    Zoster Vaccine 02/07/2013 ---    Foot Exam 07/25/2018 7/25/2017    Influenza Vaccine 08/01/2018 11/28/2017 (Declined)    Override on 11/28/2017: Declined    Hemoglobin A1c 09/28/2018 3/28/2018    Override on 11/7/2017: Done ([facility] 7.6 [facility])    Lipid Panel 02/01/2019 2/1/2018    Pneumococcal (65+) (2 of 2 - PPSV23) 03/28/2019 3/28/2018    Low Dose Statin 04/16/2019 4/16/2018    Eye Exam 04/16/2019 4/16/2018    Override  "on 3/28/2017: Done    Mammogram 08/14/2019 8/14/2017    Override on 8/14/2017: Done (WNL - repeat 1 yr - @ [facility] (Dr. Guerrier) - [place])    Colonoscopy 07/12/2022 7/12/2012           PHYSICAL EXAM     BP (!) 156/102 (BP Location: Right arm, Patient Position: Sitting, BP Method: Large (Manual))   Pulse 64   Ht 5' 6" (1.676 m)   Wt 121.3 kg (267 lb 6.7 oz)   SpO2 98%   BMI 43.16 kg/m²     Physical Exam   Constitutional: She is oriented to person, place, and time. She appears well-developed and well-nourished.   HENT:   Head: Normocephalic.   Right Ear: External ear normal.   Left Ear: External ear normal.   Nose: Nose normal.   Mouth/Throat: Oropharynx is clear and moist. No oropharyngeal exudate.   Eyes: Pupils are equal, round, and reactive to light.   Neck: Neck supple. No JVD present. No tracheal deviation present. No thyromegaly present.   Cardiovascular: Normal rate, regular rhythm, normal heart sounds and intact distal pulses. Exam reveals no gallop and no friction rub.   No murmur heard.  Pulmonary/Chest: Effort normal and breath sounds normal. No respiratory distress. She has no wheezes. She has no rales.   Abdominal: Soft. Bowel sounds are normal. She exhibits no distension. There is no tenderness.   Musculoskeletal: Normal range of motion. She exhibits no edema or tenderness.   Lymphadenopathy:     She has no cervical adenopathy.   Neurological: She is alert and oriented to person, place, and time. She displays normal reflexes. No cranial nerve deficit or sensory deficit. She exhibits normal muscle tone. Coordination normal.   Skin: Skin is warm and dry. No rash noted.   Psychiatric: She has a normal mood and affect. Her behavior is normal.   Vitals reviewed.      LABS     Lab Results   Component Value Date    HGBA1C 7.1 (H) 03/28/2018     CMP  Sodium   Date Value Ref Range Status   03/28/2018 144 136 - 145 mmol/L Final     Potassium   Date Value Ref Range Status   03/28/2018 4.2 3.5 - 5.1 mmol/L " Final     Chloride   Date Value Ref Range Status   03/28/2018 104 95 - 110 mmol/L Final     CO2   Date Value Ref Range Status   03/28/2018 30 (H) 23 - 29 mmol/L Final     Glucose   Date Value Ref Range Status   03/28/2018 141 (H) 70 - 110 mg/dL Final     BUN, Bld   Date Value Ref Range Status   03/28/2018 14 8 - 23 mg/dL Final     Creatinine   Date Value Ref Range Status   03/28/2018 1.1 0.5 - 1.4 mg/dL Final     Calcium   Date Value Ref Range Status   03/28/2018 9.9 8.7 - 10.5 mg/dL Final     Total Protein   Date Value Ref Range Status   02/01/2018 7.1 6.0 - 8.4 g/dL Final     Albumin   Date Value Ref Range Status   02/01/2018 3.8 3.5 - 5.2 g/dL Final     Total Bilirubin   Date Value Ref Range Status   02/01/2018 0.8 0.1 - 1.0 mg/dL Final     Comment:     For infants and newborns, interpretation of results should be based  on gestational age, weight and in agreement with clinical  observations.  Premature Infant recommended reference ranges:  Up to 24 hours.............<8.0 mg/dL  Up to 48 hours............<12.0 mg/dL  3-5 days..................<15.0 mg/dL  6-29 days.................<15.0 mg/dL       Alkaline Phosphatase   Date Value Ref Range Status   02/01/2018 113 55 - 135 U/L Final     AST   Date Value Ref Range Status   02/01/2018 24 10 - 40 U/L Final     ALT   Date Value Ref Range Status   02/01/2018 25 10 - 44 U/L Final     Anion Gap   Date Value Ref Range Status   03/28/2018 10 8 - 16 mmol/L Final     eGFR if    Date Value Ref Range Status   03/28/2018 >60 >60 mL/min/1.73 m^2 Final     eGFR if non    Date Value Ref Range Status   03/28/2018 53 (A) >60 mL/min/1.73 m^2 Final     Comment:     Calculation used to obtain the estimated glomerular filtration  rate (eGFR) is the CKD-EPI equation.        Lab Results   Component Value Date    WBC 6.08 09/18/2014    HGB 13.6 09/18/2014    HCT 39.6 09/18/2014    MCV 90 09/18/2014     09/18/2014     Lab Results   Component Value  Date    CHOL 133 02/01/2018    CHOL 181 11/28/2017    CHOL 177 12/03/2015     Lab Results   Component Value Date    HDL 52 02/01/2018    HDL 46 11/28/2017    HDL 50 12/03/2015     Lab Results   Component Value Date    LDLCALC 56.2 (L) 02/01/2018    LDLCALC 113.8 11/28/2017    LDLCALC 104.6 12/03/2015     Lab Results   Component Value Date    TRIG 124 02/01/2018    TRIG 106 11/28/2017    TRIG 112 12/03/2015     Lab Results   Component Value Date    CHOLHDL 39.1 02/01/2018    CHOLHDL 25.4 11/28/2017    CHOLHDL 28.2 12/03/2015     Lab Results   Component Value Date    TSH 3.629 01/03/2013       ASSESSMENT/PLAN     Maria Alejandra De La Rosa is a 63 y.o. female with  Past Medical History:   Diagnosis Date    Bipolar 1 disorder     Cataract     Diabetes mellitus type 2 in obese 2/1/2018    Diabetes mellitus, type 2     DJD (degenerative joint disease)     Dysmetabolic syndrome     Hyperglycemia     Hypertension     Lumbar stenosis     Sleep apnea      Vertigo- +dizziness with EOM. Likely BPPV. Will start meclizine as needed. Icnrease fluids. Use walker or someone for support when walking to reduce falls risk.   -     Discontinue: meclizine (ANTIVERT) 12.5 mg tablet; Take 1 tablet (12.5 mg total) by mouth 3 (three) times daily as needed.  Dispense: 40 tablet; Refill: 0  -     meclizine (ANTIVERT) 12.5 mg tablet; Take 1 tablet (12.5 mg total) by mouth 3 (three) times daily as needed.  Dispense: 40 tablet; Refill: 0    Dizziness- EKG NSR. Orthostatics negative.   -     IN OFFICE EKG 12-LEAD (to Muse)  -     CBC auto differential; Future; Expected date: 10/31/2018  -     Comprehensive metabolic panel; Future; Expected date: 10/31/2018    Hypertension associated with diabetes- at goal on manual. Cont current meds. Low na diet     Hyperlipidemia associated with type 2 diabetes mellitus- stable.     Diabetes mellitus type 2 in obese- CBG stable, no hypoglycemia. A1c today     BMI 40.0-44.9, adult-   Obesity, unspecified  classification, unspecified obesity type, unspecified whether serious comorbidity present    Follow up with PCP in 1-3 months     Patient education provided from [facility]. Patient was counseled on when and how to seek emergent care.       Elizabeth FISHMAN, ANISH, FNP-c   Department of Internal Medicine - [facility]  1:31 PM

## 2019-01-29 ENCOUNTER — PATIENT MESSAGE (OUTPATIENT)
Dept: INTERNAL MEDICINE | Facility: CLINIC | Age: 64
End: 2019-01-29

## 2019-01-30 ENCOUNTER — OFFICE VISIT (OUTPATIENT)
Dept: INTERNAL MEDICINE | Facility: CLINIC | Age: 64
End: 2019-01-30
Payer: MEDICARE

## 2019-01-30 ENCOUNTER — IMMUNIZATION (OUTPATIENT)
Dept: INTERNAL MEDICINE | Facility: CLINIC | Age: 64
End: 2019-01-30
Payer: MEDICARE

## 2019-01-30 ENCOUNTER — PATIENT MESSAGE (OUTPATIENT)
Dept: INTERNAL MEDICINE | Facility: CLINIC | Age: 64
End: 2019-01-30

## 2019-01-30 ENCOUNTER — TELEPHONE (OUTPATIENT)
Dept: INTERNAL MEDICINE | Facility: CLINIC | Age: 64
End: 2019-01-30

## 2019-01-30 VITALS
BODY MASS INDEX: 41.73 KG/M2 | HEART RATE: 68 BPM | HEIGHT: 66 IN | WEIGHT: 259.69 LBS | DIASTOLIC BLOOD PRESSURE: 78 MMHG | SYSTOLIC BLOOD PRESSURE: 138 MMHG

## 2019-01-30 DIAGNOSIS — G47.9 SLEEP DISTURBANCE: ICD-10-CM

## 2019-01-30 DIAGNOSIS — E11.69 HYPERLIPIDEMIA ASSOCIATED WITH TYPE 2 DIABETES MELLITUS: ICD-10-CM

## 2019-01-30 DIAGNOSIS — I15.2 HYPERTENSION ASSOCIATED WITH DIABETES: Primary | ICD-10-CM

## 2019-01-30 DIAGNOSIS — E11.59 HYPERTENSION ASSOCIATED WITH DIABETES: Primary | ICD-10-CM

## 2019-01-30 DIAGNOSIS — R73.9 HYPERGLYCEMIA: ICD-10-CM

## 2019-01-30 DIAGNOSIS — F43.23 ADJUSTMENT REACTION WITH ANXIETY AND DEPRESSION: ICD-10-CM

## 2019-01-30 DIAGNOSIS — E11.9 TYPE 2 DIABETES MELLITUS WITHOUT COMPLICATION, WITHOUT LONG-TERM CURRENT USE OF INSULIN: ICD-10-CM

## 2019-01-30 DIAGNOSIS — F31.9 BIPOLAR 1 DISORDER: ICD-10-CM

## 2019-01-30 DIAGNOSIS — E78.5 HYPERLIPIDEMIA ASSOCIATED WITH TYPE 2 DIABETES MELLITUS: ICD-10-CM

## 2019-01-30 LAB — GLUCOSE SERPL-MCNC: 272 MG/DL (ref 70–110)

## 2019-01-30 PROCEDURE — 99214 PR OFFICE/OUTPT VISIT, EST, LEVL IV, 30-39 MIN: ICD-10-PCS | Mod: S$PBB,,, | Performed by: INTERNAL MEDICINE

## 2019-01-30 PROCEDURE — 99214 OFFICE O/P EST MOD 30 MIN: CPT | Mod: S$PBB,,, | Performed by: INTERNAL MEDICINE

## 2019-01-30 PROCEDURE — 82962 GLUCOSE BLOOD TEST: CPT | Mod: PBBFAC | Performed by: INTERNAL MEDICINE

## 2019-01-30 PROCEDURE — 99999 PR PBB SHADOW E&M-EST. PATIENT-LVL IV: ICD-10-PCS | Mod: PBBFAC,,, | Performed by: INTERNAL MEDICINE

## 2019-01-30 PROCEDURE — 99999 PR PBB SHADOW E&M-EST. PATIENT-LVL IV: CPT | Mod: PBBFAC,,, | Performed by: INTERNAL MEDICINE

## 2019-01-30 PROCEDURE — 90662 IIV NO PRSV INCREASED AG IM: CPT | Mod: PBBFAC

## 2019-01-30 PROCEDURE — 99214 OFFICE O/P EST MOD 30 MIN: CPT | Mod: PBBFAC,25 | Performed by: INTERNAL MEDICINE

## 2019-01-31 ENCOUNTER — LAB VISIT (OUTPATIENT)
Dept: LAB | Facility: HOSPITAL | Age: 64
End: 2019-01-31
Attending: INTERNAL MEDICINE
Payer: MEDICARE

## 2019-01-31 DIAGNOSIS — E11.59 HYPERTENSION ASSOCIATED WITH DIABETES: ICD-10-CM

## 2019-01-31 DIAGNOSIS — I15.2 HYPERTENSION ASSOCIATED WITH DIABETES: ICD-10-CM

## 2019-01-31 DIAGNOSIS — E11.9 TYPE 2 DIABETES MELLITUS WITHOUT COMPLICATION, WITHOUT LONG-TERM CURRENT USE OF INSULIN: ICD-10-CM

## 2019-01-31 LAB
ALBUMIN SERPL BCP-MCNC: 3.5 G/DL
ALP SERPL-CCNC: 125 U/L
ALT SERPL W/O P-5'-P-CCNC: 18 U/L
ANION GAP SERPL CALC-SCNC: 7 MMOL/L
AST SERPL-CCNC: 17 U/L
BASOPHILS # BLD AUTO: 0.02 K/UL
BASOPHILS NFR BLD: 0.4 %
BILIRUB DIRECT SERPL-MCNC: 0.4 MG/DL
BILIRUB SERPL-MCNC: 0.8 MG/DL
BUN SERPL-MCNC: 15 MG/DL
CALCIUM SERPL-MCNC: 9.7 MG/DL
CHLORIDE SERPL-SCNC: 102 MMOL/L
CHOLEST SERPL-MCNC: 122 MG/DL
CHOLEST/HDLC SERPL: 2.7 {RATIO}
CO2 SERPL-SCNC: 31 MMOL/L
CREAT SERPL-MCNC: 1.3 MG/DL
DIFFERENTIAL METHOD: NORMAL
EOSINOPHIL # BLD AUTO: 0.1 K/UL
EOSINOPHIL NFR BLD: 2.3 %
ERYTHROCYTE [DISTWIDTH] IN BLOOD BY AUTOMATED COUNT: 12.7 %
EST. GFR  (AFRICAN AMERICAN): 50 ML/MIN/1.73 M^2
EST. GFR  (NON AFRICAN AMERICAN): 43 ML/MIN/1.73 M^2
ESTIMATED AVG GLUCOSE: 295 MG/DL
GLUCOSE SERPL-MCNC: 276 MG/DL
HBA1C MFR BLD HPLC: 11.9 %
HCT VFR BLD AUTO: 42.2 %
HDLC SERPL-MCNC: 46 MG/DL
HDLC SERPL: 37.7 %
HGB BLD-MCNC: 13.7 G/DL
LDLC SERPL CALC-MCNC: 55.6 MG/DL
LYMPHOCYTES # BLD AUTO: 1.5 K/UL
LYMPHOCYTES NFR BLD: 29.5 %
MCH RBC QN AUTO: 29.1 PG
MCHC RBC AUTO-ENTMCNC: 32.5 G/DL
MCV RBC AUTO: 90 FL
MONOCYTES # BLD AUTO: 0.5 K/UL
MONOCYTES NFR BLD: 10.5 %
NEUTROPHILS # BLD AUTO: 3 K/UL
NEUTROPHILS NFR BLD: 57.1 %
NONHDLC SERPL-MCNC: 76 MG/DL
PLATELET # BLD AUTO: 216 K/UL
PMV BLD AUTO: 9.7 FL
POTASSIUM SERPL-SCNC: 4 MMOL/L
PROT SERPL-MCNC: 6.7 G/DL
RBC # BLD AUTO: 4.71 M/UL
SODIUM SERPL-SCNC: 140 MMOL/L
TRIGL SERPL-MCNC: 102 MG/DL
WBC # BLD AUTO: 5.16 K/UL

## 2019-01-31 PROCEDURE — 80076 HEPATIC FUNCTION PANEL: CPT

## 2019-01-31 PROCEDURE — 80061 LIPID PANEL: CPT

## 2019-01-31 PROCEDURE — 80048 BASIC METABOLIC PNL TOTAL CA: CPT

## 2019-01-31 PROCEDURE — 36415 COLL VENOUS BLD VENIPUNCTURE: CPT

## 2019-01-31 PROCEDURE — 85025 COMPLETE CBC W/AUTO DIFF WBC: CPT

## 2019-01-31 PROCEDURE — 83036 HEMOGLOBIN GLYCOSYLATED A1C: CPT

## 2019-01-31 NOTE — PROGRESS NOTES
"Subjective:       Patient ID: Maria Alejandra De La Rosa is a 63 y.o. female.    Chief Complaint: Diabetes  63 year old presnts for follow up. She has had fluctuations recently in her blood sugar  She has been eating well not exercising as much over the holidays but her bs had been doing well. She started having elevations again in the beginning of January  The only thing different was she was taking  Herbal supplement for weight loss not sure if it contained sugar so she stopped it today and bs was a bit lower. She has had no urinary symptoms  Or signs of infection She has had no fever or cough. She reports has been compliant with her medications. She does feel fatigued often   She reports did see her gyn for her pap and had her mammogram in November shebrings a copy  She had life line screening as well which was normal including aaa, ekg, cad screen and bone density , no sign pvd    HPI  Review of Systems   Constitutional: Positive for fatigue.   Psychiatric/Behavioral:        Anxious down at times  Plans to follow up with psychiatry        Objective:     Blood pressure 138/78, pulse 68, height 5' 6" (1.676 m), weight 117.8 kg (259 lb 11.2 oz).  bs 272   Physical Exam   Constitutional: No distress.   HENT:   Head: Normocephalic.   Mouth/Throat: Oropharynx is clear and moist.   Eyes: No scleral icterus.   Neck: Neck supple.   Cardiovascular: Normal rate, regular rhythm and normal heart sounds. Exam reveals no gallop and no friction rub.   No murmur heard.  Pulses:       Dorsalis pedis pulses are 1+ on the right side, and 1+ on the left side.        Posterior tibial pulses are 1+ on the right side, and 1+ on the left side.   Pulmonary/Chest: Effort normal and breath sounds normal. No respiratory distress.   Abdominal: Soft. Bowel sounds are normal. She exhibits no mass. There is no tenderness.   Musculoskeletal: She exhibits no edema.   Feet:   Right Foot:   Protective Sensation: 6 sites tested. 6 sites sensed.   Skin Integrity: " Negative for ulcer.   Left Foot:   Protective Sensation: 6 sites tested. 6 sites sensed.   Skin Integrity: Negative for ulcer.   Neurological: She is alert.   Skin: No erythema.   Psychiatric: She has a normal mood and affect.   Vitals reviewed.      Assessment:       1. Hypertension associated with diabetes    2. Sleep disturbance    3. Type 2 diabetes mellitus without complication, without long-term current use of insulin    4. Hyperglycemia    5. Hyperlipidemia associated with type 2 diabetes mellitus    6. Bipolar 1 disorder    7. Adjustment reaction with anxiety and depression        Plan:       Maria Alejandra was seen today for diabetes.    Diagnoses and all orders for this visit:    Hypertension associated with diabetes  Encouraged her to take carvedolol twice a day   Continue losratan and hctz for bp control  Home monitirong   -     Basic metabolic panel; Future  -     CBC auto differential; Future  -     Hemoglobin A1c; Future  -     Hepatic function panel; Future  -     Lipid panel; Future    Sleep disturbance  Hx of with fatigue discussed consider follow up with sleep clinic   -     Ambulatory consult to Sleep Disorders    Type 2 diabetes mellitus without complication, without long-term current use of insulin  She will continue current regimine stop supplement  And see if bs trends down updat labs and consider increase amaryl or additional agents  Resume exercise and impromved diet discussed   Consider diabetic education if no impromment   Will scheudle labs and review   -     Hemoglobin A1c; Future    Hyperglycemia  -     POCT Glucose, Hand-Held Device    Hyperlipidemia associated with type 2 diabetes mellitus  Hx of remains on atorvastatin     Bipolar 1 disorder  Depression/anxiety hx of   Recommended psychaitry follow up  She was encoruaged to call and scheudle      Follow up 3 months

## 2019-02-01 DIAGNOSIS — E11.69 TYPE 2 DIABETES MELLITUS WITH OTHER SPECIFIED COMPLICATION, WITHOUT LONG-TERM CURRENT USE OF INSULIN: Primary | ICD-10-CM

## 2019-02-01 RX ORDER — GLIMEPIRIDE 4 MG/1
4 TABLET ORAL
Qty: 90 TABLET | Refills: 3 | Status: SHIPPED | OUTPATIENT
Start: 2019-02-01 | End: 2019-07-08 | Stop reason: SDUPTHER

## 2019-02-11 NOTE — PROGRESS NOTES
Subjective:      Patient ID: Maria Alejandra De La Rosa is a 64 y.o. female.    Chief Complaint:  Diabetes (New patient )    History of Present Illness  Maria Alejandra De La Rosa presents today for Evaluation & management of type 2 DM.  This is her first visit with me.     With regards to the diabetes:    Diagnosed:   Known complications:  None     Current regimen:  Amaryl 4 mg daily  Tradjenta 5 mg daily    Missed doses? No    Other medications tried:  Metformin- stopped d/t GI SE    1 times a day testing  Log reviewed: yes  7 day avg 174  14 day 198  30 day 243  90 day 242    Fastin, 214, 149, 153, 187, 155, 244,146     Eats 2 meals a day.   Snacks- yes on chips        Drinks water     Exercise - tried to stay active     Hypoglycemic event? None   Knows how to correct with 15 grams of carbs- juice, coke, or a peppermint.      Education - last visit:  2014    Diabetes Management Status  Statin: Taking  ACE/ARB: Taking  Screening or Prevention Patient's value Goal Complete/Controlled?   HgA1C Testing and Control   Lab Results   Component Value Date    HGBA1C 11.9 (H) 2019      Annually/Less than 8% No   Lipid profile : 2019 Annually Yes   LDL control Lab Results   Component Value Date    LDLCALC 55.6 (L) 2019    Annually/Less than 100 mg/dl  Yes   Nephropathy screening Lab Results   Component Value Date    LABMICR 3.0 2014     Lab Results   Component Value Date    PROTEINUA Negative 2013    Annually No   Blood pressure BP Readings from Last 1 Encounters:   19 130/80    Less than 140/90 Yes   Dilated retinal exam : 2018 Annually Yes   Foot exam   : 2019 Annually Yes     Denies history of pancreatitis & personal/family history of medullary thyroid cancer.     Review of Systems   Constitutional: Positive for unexpected weight change (gain). Negative for fatigue.   Eyes: Negative for visual disturbance.   Respiratory: Negative for cough and shortness of breath.    Cardiovascular:  Negative for chest pain.   Gastrointestinal: Negative for abdominal pain.   Endocrine: Positive for polyuria. Negative for cold intolerance, heat intolerance, polydipsia and polyphagia.   Musculoskeletal: Negative for arthralgias.   Skin: Negative for wound.   Neurological: Negative for headaches.   Hematological: Does not bruise/bleed easily.   Psychiatric/Behavioral: Negative for sleep disturbance.     Objective:   Physical Exam   Constitutional: She appears well-developed.   HENT:   Right Ear: External ear normal.   Left Ear: External ear normal.   Nose: Nose normal.   Hearing Normal     Neck: No tracheal deviation present. No thyromegaly present.   Cardiovascular: Normal rate.   No murmur heard.  No edema present   Pulmonary/Chest: Effort normal and breath sounds normal.   Abdominal: Soft. She exhibits no mass. No hernia.   Neurological: She is alert. No cranial nerve deficit or sensory deficit.   Skin: No rash noted.   No nodules.   Psychiatric: She has a normal mood and affect. Judgment normal.   Vitals reviewed.  Appropriate footwear, Foot exam deferred, done 1/2019    Body mass index is 42.53 kg/m².    Lab Review:   Lab Results   Component Value Date    HGBA1C 11.9 (H) 01/31/2019     Lab Results   Component Value Date    CHOL 122 01/31/2019    HDL 46 01/31/2019    LDLCALC 55.6 (L) 01/31/2019    TRIG 102 01/31/2019    CHOLHDL 37.7 01/31/2019     Lab Results   Component Value Date     01/31/2019    K 4.0 01/31/2019     01/31/2019    CO2 31 (H) 01/31/2019     (H) 01/31/2019    BUN 15 01/31/2019    CREATININE 1.3 01/31/2019    CALCIUM 9.7 01/31/2019    PROT 6.7 01/31/2019    ALBUMIN 3.5 01/31/2019    BILITOT 0.8 01/31/2019    ALKPHOS 125 01/31/2019    AST 17 01/31/2019    ALT 18 01/31/2019    ANIONGAP 7 (L) 01/31/2019    ESTGFRAFRICA 50 (A) 01/31/2019    EGFRNONAA 43 (A) 01/31/2019    TSH 3.629 01/03/2013     Assessment and Plan     1. Diabetes mellitus type 2 in obese  dulaglutide (TRULICITY)  1.5 mg/0.5 mL PnIj    dulaglutide (TRULICITY) 0.75 mg/0.5 mL PnIj    Hemoglobin A1c    Comprehensive metabolic panel    DISCONTINUED: dulaglutide (TRULICITY) 0.75 mg/0.5 mL PnIj   2. Hypertension associated with diabetes     3. Hyperlipidemia associated with type 2 diabetes mellitus     4. BMI 40.0-44.9, adult       Diabetes mellitus type 2 in obese  -- Reviewed goals of therapy are to get the best control we can without hypoglycemia  Stop: Tradjenta   Start: Trulicity 0.75mg weekly for 4 weeks & then increase to 1.5 mg weekly indefinitely. Discussed MOA & SE.   Continue amaryl daily. Discussed MOA & SE   -- Reviewed patient's current insulin regimen. Clarified proper insulin dose and timing in relation to meals, etc. Insulin injection sites and proper rotation instructed.    -- Advised frequent self blood glucose monitoring.  Patient encouraged to document glucose results and bring them to every clinic visit    -- Hypoglycemia precautions discussed. Instructed on precautions before driving.    -- Call for Bg repeatedly < 90 or > 180.   -- Close adherence to lifestyle changes recommended.   -- Periodic follow ups for eye evaluations, foot care and dental care suggested.    Hypertension associated with diabetes  -- on ARB  -- Controlled  -- Blood pressure goals discussed with patient      Hyperlipidemia associated with type 2 diabetes mellitus  Controlled   On statin per ADA recommendations      BMI 40.0-44.9, adult  -- encouraged dietary and lifestyle modifications   -- emphasized weight loss goals    -- trulicity should help      Follow-up in about 5 months (around 7/18/2019).  Labs prior to next appointment with me

## 2019-02-18 ENCOUNTER — OFFICE VISIT (OUTPATIENT)
Dept: ENDOCRINOLOGY | Facility: CLINIC | Age: 64
End: 2019-02-18
Payer: MEDICARE

## 2019-02-18 VITALS
WEIGHT: 263.5 LBS | BODY MASS INDEX: 42.35 KG/M2 | SYSTOLIC BLOOD PRESSURE: 130 MMHG | HEIGHT: 66 IN | DIASTOLIC BLOOD PRESSURE: 80 MMHG | HEART RATE: 78 BPM

## 2019-02-18 DIAGNOSIS — E78.5 HYPERLIPIDEMIA ASSOCIATED WITH TYPE 2 DIABETES MELLITUS: ICD-10-CM

## 2019-02-18 DIAGNOSIS — E11.59 HYPERTENSION ASSOCIATED WITH DIABETES: ICD-10-CM

## 2019-02-18 DIAGNOSIS — I15.2 HYPERTENSION ASSOCIATED WITH DIABETES: ICD-10-CM

## 2019-02-18 DIAGNOSIS — E11.69 HYPERLIPIDEMIA ASSOCIATED WITH TYPE 2 DIABETES MELLITUS: ICD-10-CM

## 2019-02-18 DIAGNOSIS — E11.69 DIABETES MELLITUS TYPE 2 IN OBESE: Primary | ICD-10-CM

## 2019-02-18 DIAGNOSIS — E66.9 DIABETES MELLITUS TYPE 2 IN OBESE: Primary | ICD-10-CM

## 2019-02-18 PROCEDURE — 99213 OFFICE O/P EST LOW 20 MIN: CPT | Mod: PBBFAC | Performed by: NURSE PRACTITIONER

## 2019-02-18 PROCEDURE — 99214 PR OFFICE/OUTPT VISIT, EST, LEVL IV, 30-39 MIN: ICD-10-PCS | Mod: S$PBB,ICN,, | Performed by: NURSE PRACTITIONER

## 2019-02-18 PROCEDURE — 99999 PR PBB SHADOW E&M-EST. PATIENT-LVL III: ICD-10-PCS | Mod: PBBFAC,,, | Performed by: NURSE PRACTITIONER

## 2019-02-18 PROCEDURE — 99999 PR PBB SHADOW E&M-EST. PATIENT-LVL III: CPT | Mod: PBBFAC,,, | Performed by: NURSE PRACTITIONER

## 2019-02-18 PROCEDURE — 99214 OFFICE O/P EST MOD 30 MIN: CPT | Mod: S$PBB,ICN,, | Performed by: NURSE PRACTITIONER

## 2019-02-18 NOTE — ASSESSMENT & PLAN NOTE
-- encouraged dietary and lifestyle modifications   -- emphasized weight loss goals    -- trulicity should help

## 2019-02-18 NOTE — ASSESSMENT & PLAN NOTE
-- Reviewed goals of therapy are to get the best control we can without hypoglycemia  Stop: Tradjenta   Start: Trulicity 0.75mg weekly for 4 weeks & then increase to 1.5 mg weekly indefinitely. Discussed MOA & SE.   Continue amaryl daily. Discussed MOA & SE   -- Reviewed patient's current insulin regimen. Clarified proper insulin dose and timing in relation to meals, etc. Insulin injection sites and proper rotation instructed.    -- Advised frequent self blood glucose monitoring.  Patient encouraged to document glucose results and bring them to every clinic visit    -- Hypoglycemia precautions discussed. Instructed on precautions before driving.    -- Call for Bg repeatedly < 90 or > 180.   -- Close adherence to lifestyle changes recommended.   -- Periodic follow ups for eye evaluations, foot care and dental care suggested.

## 2019-02-18 NOTE — LETTER
February 18, 2019      Caryn Ambrosio MD  [address]  [facility] [address]           [facility]  [address]  [facility] [address]  Phone: [phone]          Patient: Maria Alejandra De La Rosa   MR Number: [id number]   YOB: 1955   Date of Visit: 2/18/2019       Dear Dr. Caryn Ambrosio:    Thank you for referring Maria Alejandra De La Rosa to me for evaluation. Attached you will find relevant portions of my assessment and plan of care.    If you have questions, please do not hesitate to call me. I look forward to following Maria Alejandra De La Rosa along with you.    Sincerely,    Taina Roe, NP    Enclosure  CC:  No Recipients    If you would like to receive this communication electronically, please contact [email or web address] or [phone] to request more information on [employer] Link access.    For providers and/or their staff who would like to refer a patient to [facility], please contact us through our one-stop-shop provider referral line, [facility], at [phone].    If you feel you have received this communication in error or would no longer like to receive these types of communications, please e-mail [email or web address]

## 2019-02-26 ENCOUNTER — PATIENT MESSAGE (OUTPATIENT)
Dept: ENDOCRINOLOGY | Facility: CLINIC | Age: 64
End: 2019-02-26

## 2019-03-07 ENCOUNTER — PATIENT MESSAGE (OUTPATIENT)
Dept: ENDOCRINOLOGY | Facility: CLINIC | Age: 64
End: 2019-03-07

## 2019-03-14 ENCOUNTER — PATIENT MESSAGE (OUTPATIENT)
Dept: ENDOCRINOLOGY | Facility: CLINIC | Age: 64
End: 2019-03-14

## 2019-03-21 ENCOUNTER — PATIENT MESSAGE (OUTPATIENT)
Dept: ENDOCRINOLOGY | Facility: CLINIC | Age: 64
End: 2019-03-21

## 2019-03-22 RX ORDER — ATORVASTATIN CALCIUM 10 MG/1
TABLET, FILM COATED ORAL
Qty: 90 TABLET | Refills: 3 | Status: SHIPPED | OUTPATIENT
Start: 2019-03-22 | End: 2020-03-05

## 2019-04-02 RX ORDER — LOSARTAN POTASSIUM 100 MG/1
TABLET ORAL
Qty: 90 TABLET | Refills: 3 | Status: SHIPPED | OUTPATIENT
Start: 2019-04-02 | End: 2020-03-20

## 2019-04-04 DIAGNOSIS — E11.69 DIABETES MELLITUS TYPE 2 IN OBESE: ICD-10-CM

## 2019-04-04 DIAGNOSIS — E66.9 DIABETES MELLITUS TYPE 2 IN OBESE: ICD-10-CM

## 2019-04-09 ENCOUNTER — PATIENT MESSAGE (OUTPATIENT)
Dept: ENDOCRINOLOGY | Facility: CLINIC | Age: 64
End: 2019-04-09

## 2019-04-29 ENCOUNTER — PATIENT MESSAGE (OUTPATIENT)
Dept: ENDOCRINOLOGY | Facility: CLINIC | Age: 64
End: 2019-04-29

## 2019-04-30 ENCOUNTER — CLINICAL SUPPORT (OUTPATIENT)
Dept: INTERNAL MEDICINE | Facility: CLINIC | Age: 64
End: 2019-04-30
Payer: MEDICARE

## 2019-04-30 ENCOUNTER — OFFICE VISIT (OUTPATIENT)
Dept: INTERNAL MEDICINE | Facility: CLINIC | Age: 64
End: 2019-04-30
Payer: MEDICARE

## 2019-04-30 VITALS
WEIGHT: 262.13 LBS | DIASTOLIC BLOOD PRESSURE: 72 MMHG | BODY MASS INDEX: 42.13 KG/M2 | SYSTOLIC BLOOD PRESSURE: 120 MMHG | HEIGHT: 66 IN | HEART RATE: 69 BPM

## 2019-04-30 DIAGNOSIS — E11.69 HYPERLIPIDEMIA ASSOCIATED WITH TYPE 2 DIABETES MELLITUS: ICD-10-CM

## 2019-04-30 DIAGNOSIS — I10 ESSENTIAL HYPERTENSION: ICD-10-CM

## 2019-04-30 DIAGNOSIS — E11.9 TYPE 2 DIABETES MELLITUS WITHOUT COMPLICATION, WITHOUT LONG-TERM CURRENT USE OF INSULIN: Primary | ICD-10-CM

## 2019-04-30 DIAGNOSIS — E78.5 HYPERLIPIDEMIA ASSOCIATED WITH TYPE 2 DIABETES MELLITUS: ICD-10-CM

## 2019-04-30 PROCEDURE — 99213 OFFICE O/P EST LOW 20 MIN: CPT | Mod: PBBFAC | Performed by: INTERNAL MEDICINE

## 2019-04-30 PROCEDURE — 99214 OFFICE O/P EST MOD 30 MIN: CPT | Mod: S$PBB,,, | Performed by: INTERNAL MEDICINE

## 2019-04-30 PROCEDURE — G0009 ADMIN PNEUMOCOCCAL VACCINE: HCPCS | Mod: PBBFAC

## 2019-04-30 PROCEDURE — 99214 PR OFFICE/OUTPT VISIT, EST, LEVL IV, 30-39 MIN: ICD-10-PCS | Mod: S$PBB,,, | Performed by: INTERNAL MEDICINE

## 2019-04-30 PROCEDURE — 99999 PR PBB SHADOW E&M-EST. PATIENT-LVL III: ICD-10-PCS | Mod: PBBFAC,,, | Performed by: INTERNAL MEDICINE

## 2019-04-30 PROCEDURE — 99999 PR PBB SHADOW E&M-EST. PATIENT-LVL III: CPT | Mod: PBBFAC,,, | Performed by: INTERNAL MEDICINE

## 2019-04-30 NOTE — PROGRESS NOTES
Answers for HPI/ROS submitted by the patient on 4/29/2019   activity change: No  unexpected weight change: No  neck pain: No  hearing loss: No  rhinorrhea: No  trouble swallowing: No  eye discharge: No  visual disturbance: No  chest tightness: No  wheezing: No  chest pain: No  palpitations: No  blood in stool: No  constipation: No  vomiting: No  diarrhea: No  polydipsia: No  polyuria: No  difficulty urinating: No  hematuria: No  menstrual problem: No  dysuria: No  joint swelling: No  arthralgias: No  headaches: No  weakness: No  confusion: No  dysphoric mood: No  Subjective:       Patient ID: Maria Alejandra De La Rosa is a 64 y.o. female.    Chief Complaint: Follow-up   This is a 64-year-old who presents today for follow-up since last visit she reports doing better she has been working on trying to eat a little healthier has not started doing a lot of exercise but hopes to get into that soon.  She did getting with endocrinology and her A1c had been markedly elevated at last visit she was switched to alternate medicine her Amaryl was kept the same and she was started on trulicity at  low-dose and increased to 1.5  Patient reports to took her a little bit to get used to the shots which she was fearful of.  Although her weight has not declined whole yacht lot yet she has had great improvement in her home blood sugars and has been faxing them to endocrinology she does have a follow-up planned in July and she will be doing her repeat blood work at that time.  She is due for annual eye exam and plans to schedule    HPI  Review of Systems   Constitutional: Negative for activity change and unexpected weight change.   HENT: Negative for hearing loss, rhinorrhea and trouble swallowing.    Eyes: Negative for discharge and visual disturbance.   Respiratory: Negative for chest tightness and wheezing.    Cardiovascular: Negative for chest pain and palpitations.   Gastrointestinal: Negative for blood in stool, constipation, diarrhea and  "vomiting.   Endocrine: Negative for polydipsia and polyuria.   Genitourinary: Negative for difficulty urinating, dysuria, hematuria and menstrual problem.   Musculoskeletal: Negative for arthralgias, joint swelling and neck pain.   Neurological: Negative for weakness and headaches.   Psychiatric/Behavioral: Negative for confusion and dysphoric mood.       Objective:     Blood pressure 120/72, pulse 69, height 5' 6" (1.676 m), weight 118.9 kg (262 lb 2 oz).    Physical Exam   Constitutional: No distress.   HENT:   Head: Normocephalic.   Mouth/Throat: Oropharynx is clear and moist.   Eyes: No scleral icterus.   Neck: Neck supple.   Cardiovascular: Normal rate, regular rhythm and normal heart sounds. Exam reveals no gallop and no friction rub.   No murmur heard.  Pulmonary/Chest: Effort normal and breath sounds normal. No respiratory distress.   Abdominal: Soft. Bowel sounds are normal. She exhibits no mass. There is no tenderness.   Musculoskeletal: She exhibits no edema.   Neurological: She is alert.   Skin: No erythema.   Vitals reviewed.      Assessment:       1. Type 2 diabetes mellitus without complication, without long-term current use of insulin    2. Essential hypertension    3. Hyperlipidemia associated with type 2 diabetes mellitus        Plan:       Maria Alejandra was seen today for follow-up.    Diagnoses and all orders for this visit:    Type 2 diabetes mellitus without complication, without long-term current use of insulin  History of she has had improved compliance with dietary measures has been in to see Endocrinology to try to work on improvement in her hemoglobin A1c.  She is now taking glimepiride 4 mg along with trulicity injections without difficulty   -     Ambulatory consult to Optometry    Essential hypertension  Blood pressure has been doing well and she is taking her medicines tolerating without difficulty will continue her losartan and carvedilol hydrochlorothiazide she will call if refills are " needed    Hyperlipidemia associated with type 2 diabetes mellitus  She remains on atorvastatin    She will keep her upcoming endocrine appointment with blood work prior she will schejosé luisule   Declined additional labs today    Follow up 4 months  Pneumovax discussed     She will scheule her optho appt as planned

## 2019-05-09 ENCOUNTER — PATIENT MESSAGE (OUTPATIENT)
Dept: INTERNAL MEDICINE | Facility: CLINIC | Age: 64
End: 2019-05-09

## 2019-05-10 RX ORDER — HYDROCHLOROTHIAZIDE 25 MG/1
25 TABLET ORAL DAILY
Qty: 90 TABLET | Refills: 4 | Status: SHIPPED | OUTPATIENT
Start: 2019-05-10 | End: 2020-04-17

## 2019-05-20 ENCOUNTER — PATIENT MESSAGE (OUTPATIENT)
Dept: ENDOCRINOLOGY | Facility: CLINIC | Age: 64
End: 2019-05-20

## 2019-05-20 DIAGNOSIS — E66.9 DIABETES MELLITUS TYPE 2 IN OBESE: ICD-10-CM

## 2019-05-20 DIAGNOSIS — E11.69 DIABETES MELLITUS TYPE 2 IN OBESE: ICD-10-CM

## 2019-05-28 ENCOUNTER — INITIAL CONSULT (OUTPATIENT)
Dept: OPTOMETRY | Facility: CLINIC | Age: 64
End: 2019-05-28
Payer: MEDICARE

## 2019-05-28 DIAGNOSIS — H52.03 HYPEROPIA WITH PRESBYOPIA OF BOTH EYES: ICD-10-CM

## 2019-05-28 DIAGNOSIS — H52.4 HYPEROPIA WITH PRESBYOPIA OF BOTH EYES: ICD-10-CM

## 2019-05-28 DIAGNOSIS — H25.13 NUCLEAR SCLEROSIS OF BOTH EYES: ICD-10-CM

## 2019-05-28 DIAGNOSIS — E11.9 TYPE 2 DIABETES MELLITUS WITHOUT OPHTHALMIC MANIFESTATIONS: Primary | ICD-10-CM

## 2019-05-28 PROCEDURE — 92015 DETERMINE REFRACTIVE STATE: CPT | Mod: ,,, | Performed by: OPTOMETRIST

## 2019-05-28 PROCEDURE — 99999 PR PBB SHADOW E&M-EST. PATIENT-LVL II: CPT | Mod: PBBFAC,,, | Performed by: OPTOMETRIST

## 2019-05-28 PROCEDURE — 92014 PR EYE EXAM, EST PATIENT,COMPREHESV: ICD-10-PCS | Mod: S$PBB,,, | Performed by: OPTOMETRIST

## 2019-05-28 PROCEDURE — 99999 PR PBB SHADOW E&M-EST. PATIENT-LVL II: ICD-10-PCS | Mod: PBBFAC,,, | Performed by: OPTOMETRIST

## 2019-05-28 PROCEDURE — 92014 COMPRE OPH EXAM EST PT 1/>: CPT | Mod: S$PBB,,, | Performed by: OPTOMETRIST

## 2019-05-28 PROCEDURE — 92015 PR REFRACTION: ICD-10-PCS | Mod: ,,, | Performed by: OPTOMETRIST

## 2019-05-28 PROCEDURE — 99212 OFFICE O/P EST SF 10 MIN: CPT | Mod: PBBFAC | Performed by: OPTOMETRIST

## 2019-05-28 NOTE — LETTER
May 28, 2019      Caryn Ambrosio MD  [address]  [facility] [address]           [facility]  [address]  [facility] [address]  Phone: [phone]  Fax: [phone]          Patient: Maria Alejandra De La Rosa   MR Number: [id number]   YOB: 1955   Date of Visit: 5/28/2019       Dear Dr. Caryn Ambrosio:    Thank you for referring Maria Alejandra De La Rosa to me for evaluation. Attached you will find relevant portions of my assessment and plan of care.    If you have questions, please do not hesitate to call me. I look forward to following Maria Alejandra De La Rosa along with you.    Sincerely,    Liz Puente, OD    Enclosure  CC:  No Recipients    If you would like to receive this communication electronically, please contact [email or web address] or [phone] to request more information on [employer] Link access.    For providers and/or their staff who would like to refer a patient to [facility], please contact us through our one-stop-shop provider referral line, [facility], at [phone].    If you feel you have received this communication in error or would no longer like to receive these types of communications, please e-mail [email or web address]

## 2019-05-28 NOTE — PROGRESS NOTES
HPI     Last eye exam was 4/16/18 with Dr. Puente.  Patient states slight decrease in vision since last exam. Wanted to get an   updated rx today.  Patient denies diplopia, headaches, flashes/floaters, and pain.    Hemoglobin A1C       Date                     Value               Ref Range             Status                01/31/2019               11.9 (H)            4.0 - 5.6 %           Final                 Last edited by Sheyla Miller on 5/28/2019  1:54 PM. (History)            Assessment /Plan     For exam results, see Encounter Report.    Type 2 diabetes mellitus without ophthalmic manifestations    Nuclear sclerosis of both eyes    Hyperopia with presbyopia of both eyes          1.  No retinopathy--monitor yearly.  BS control.  Eye health normal [facility].  2.  Educated on cataracts and affects on vision.  Patient happy with vision. Monitor.  3.  Bifocal rx given

## 2019-06-12 ENCOUNTER — PATIENT MESSAGE (OUTPATIENT)
Dept: ENDOCRINOLOGY | Facility: CLINIC | Age: 64
End: 2019-06-12

## 2019-06-16 RX ORDER — BLOOD-GLUCOSE METER
EACH MISCELLANEOUS
Qty: 100 STRIP | Refills: 3 | Status: SHIPPED | OUTPATIENT
Start: 2019-06-16 | End: 2020-06-26

## 2019-07-01 RX ORDER — CARVEDILOL 12.5 MG/1
TABLET ORAL
Qty: 180 TABLET | Refills: 3 | Status: SHIPPED | OUTPATIENT
Start: 2019-07-01 | End: 2020-09-04

## 2019-07-02 ENCOUNTER — PATIENT MESSAGE (OUTPATIENT)
Dept: ENDOCRINOLOGY | Facility: CLINIC | Age: 64
End: 2019-07-02

## 2019-07-08 ENCOUNTER — LAB VISIT (OUTPATIENT)
Dept: LAB | Facility: HOSPITAL | Age: 64
End: 2019-07-08
Payer: MEDICARE

## 2019-07-08 DIAGNOSIS — E11.69 DIABETES MELLITUS TYPE 2 IN OBESE: ICD-10-CM

## 2019-07-08 DIAGNOSIS — E66.9 DIABETES MELLITUS TYPE 2 IN OBESE: ICD-10-CM

## 2019-07-08 LAB
ESTIMATED AVG GLUCOSE: 134 MG/DL (ref 68–131)
HBA1C MFR BLD HPLC: 6.3 % (ref 4–5.6)

## 2019-07-08 PROCEDURE — 83036 HEMOGLOBIN GLYCOSYLATED A1C: CPT

## 2019-07-08 PROCEDURE — 36415 COLL VENOUS BLD VENIPUNCTURE: CPT

## 2019-07-08 RX ORDER — GLIMEPIRIDE 4 MG/1
4 TABLET ORAL
Qty: 90 TABLET | Refills: 3 | Status: SHIPPED | OUTPATIENT
Start: 2019-07-08 | End: 2020-06-22

## 2019-07-08 RX ORDER — GLIMEPIRIDE 2 MG/1
TABLET ORAL
Qty: 90 TABLET | Refills: 3 | OUTPATIENT
Start: 2019-07-08

## 2019-07-21 NOTE — PROGRESS NOTES
Subjective:      Patient ID: Maria Alejandra De La Rosa is a 64 y.o. female.    Chief Complaint:  Diabetes    History of Present Illness  Maria Alejandra De La Rosa presents today for follow up of T2DM.  Last seen 19.    With regards to the diabetes:    Diagnosed:   Known complications:  None     Current regimen:  Amaryl 4 mg daily  Trulicity 1.5 mg weekly     Missed doses? No    Other medications tried:  Metformin- stopped d/t GI SE  Tradjenta    1 times a day testing  Log reviewed: yes    Fastin this AM   80-90's     Eats 2 meals a day.   Snacks- yes on chips        Drinks water     Exercise - tried to stay active     Hypoglycemic event? None   Knows how to correct with 15 grams of carbs- juice, coke, or a peppermint.      Education - last visit:  2014    Diabetes Management Status  Statin: Taking  ACE/ARB: Taking  Screening or Prevention Patient's value Goal Complete/Controlled?   HgA1C Testing and Control   Lab Results   Component Value Date    HGBA1C 6.3 (H) 2019      Annually/Less than 8% No   Lipid profile : 2019 Annually Yes   LDL control Lab Results   Component Value Date    LDLCALC 55.6 (L) 2019    Annually/Less than 100 mg/dl  Yes   Nephropathy screening Lab Results   Component Value Date    LABMICR 3.0 2014     Lab Results   Component Value Date    PROTEINUA Negative 2013    Annually No   Blood pressure BP Readings from Last 1 Encounters:   19 118/82    Less than 140/90 Yes   Dilated retinal exam : 2019 Annually Yes   Foot exam   : 2019 Annually Yes     Denies history of pancreatitis & personal/family history of medullary thyroid cancer.     BMD at  recently that was normal. Takes Vitamin D daily.    Review of Systems   Constitutional: Negative for unexpected weight change.   Eyes: Negative for visual disturbance.   Respiratory: Negative for shortness of breath.    Cardiovascular: Negative for chest pain.   Gastrointestinal: Negative for abdominal pain.    Endocrine: Negative for cold intolerance, heat intolerance, polydipsia, polyphagia and polyuria.   Musculoskeletal: Negative for arthralgias.   Skin: Negative for wound.   Neurological: Negative for headaches.   Hematological: Does not bruise/bleed easily.   Psychiatric/Behavioral: Negative for sleep disturbance.     Objective:   Physical Exam   Neck: No thyromegaly present.   Cardiovascular: Normal rate.   No edema present   Pulmonary/Chest: Effort normal.   Abdominal: Soft.   Vitals reviewed.  Appropriate footwear, Foot exam deferred, done 1/2019  injection sites are ok. No lipo hypertropthy or atrophy    Body mass index is 42.24 kg/m².    Lab Review:   Lab Results   Component Value Date    HGBA1C 6.3 (H) 07/08/2019     Lab Results   Component Value Date    CHOL 122 01/31/2019    HDL 46 01/31/2019    LDLCALC 55.6 (L) 01/31/2019    TRIG 102 01/31/2019    CHOLHDL 37.7 01/31/2019     Lab Results   Component Value Date     01/31/2019    K 4.0 01/31/2019     01/31/2019    CO2 31 (H) 01/31/2019     (H) 01/31/2019    BUN 15 01/31/2019    CREATININE 1.3 01/31/2019    CALCIUM 9.7 01/31/2019    PROT 6.7 01/31/2019    ALBUMIN 3.5 01/31/2019    BILITOT 0.8 01/31/2019    ALKPHOS 125 01/31/2019    AST 17 01/31/2019    ALT 18 01/31/2019    ANIONGAP 7 (L) 01/31/2019    ESTGFRAFRICA 50 (A) 01/31/2019    EGFRNONAA 43 (A) 01/31/2019    TSH 3.629 01/03/2013     Assessment and Plan     1. Diabetes mellitus type 2 in obese  Hemoglobin A1c    Comprehensive metabolic panel    Microalbumin/creatinine urine ratio   2. Hypertension associated with diabetes     3. Hyperlipidemia associated with type 2 diabetes mellitus  Lipid panel   4. Obesity, unspecified classification, unspecified obesity type, unspecified whether serious comorbidity present       Diabetes mellitus type 2 in obese  -- Reviewed goals of therapy are to get the best control we can without hypoglycemia  Continue amaryl daily & Trulicity 1.5mg weekly.  Discussed MOA & SE   -- Advised frequent self blood glucose monitoring.  Patient encouraged to document glucose results and bring them to every clinic visit    -- Hypoglycemia precautions discussed. Instructed on precautions before driving.    -- Call for Bg repeatedly < 90 or > 180.   -- Close adherence to lifestyle changes recommended.   -- Periodic follow ups for eye evaluations, foot care and dental care suggested.    Hypertension associated with diabetes  -- on ARB  -- Controlled  -- Blood pressure goals discussed with patient      Hyperlipidemia associated with type 2 diabetes mellitus  Controlled   On statin per ADA recommendations      Obesity  -- encouraged dietary and lifestyle modifications   -- emphasized weight loss goals    -- trulicity should help      Follow up in about 6 months (around 1/24/2020).  Labs prior to next appointment with me

## 2019-07-24 ENCOUNTER — OFFICE VISIT (OUTPATIENT)
Dept: ENDOCRINOLOGY | Facility: CLINIC | Age: 64
End: 2019-07-24
Payer: MEDICARE

## 2019-07-24 VITALS
DIASTOLIC BLOOD PRESSURE: 82 MMHG | HEIGHT: 66 IN | SYSTOLIC BLOOD PRESSURE: 118 MMHG | BODY MASS INDEX: 42.06 KG/M2 | WEIGHT: 261.69 LBS | HEART RATE: 77 BPM

## 2019-07-24 DIAGNOSIS — E11.59 HYPERTENSION ASSOCIATED WITH DIABETES: ICD-10-CM

## 2019-07-24 DIAGNOSIS — E66.9 DIABETES MELLITUS TYPE 2 IN OBESE: Primary | ICD-10-CM

## 2019-07-24 DIAGNOSIS — E66.9 OBESITY, UNSPECIFIED CLASSIFICATION, UNSPECIFIED OBESITY TYPE, UNSPECIFIED WHETHER SERIOUS COMORBIDITY PRESENT: ICD-10-CM

## 2019-07-24 DIAGNOSIS — I15.2 HYPERTENSION ASSOCIATED WITH DIABETES: ICD-10-CM

## 2019-07-24 DIAGNOSIS — E11.69 DIABETES MELLITUS TYPE 2 IN OBESE: Primary | ICD-10-CM

## 2019-07-24 DIAGNOSIS — E11.69 HYPERLIPIDEMIA ASSOCIATED WITH TYPE 2 DIABETES MELLITUS: ICD-10-CM

## 2019-07-24 DIAGNOSIS — E78.5 HYPERLIPIDEMIA ASSOCIATED WITH TYPE 2 DIABETES MELLITUS: ICD-10-CM

## 2019-07-24 PROCEDURE — 99214 OFFICE O/P EST MOD 30 MIN: CPT | Mod: S$PBB,,, | Performed by: NURSE PRACTITIONER

## 2019-07-24 PROCEDURE — 99999 PR PBB SHADOW E&M-EST. PATIENT-LVL III: CPT | Mod: PBBFAC,,, | Performed by: NURSE PRACTITIONER

## 2019-07-24 PROCEDURE — 99213 OFFICE O/P EST LOW 20 MIN: CPT | Mod: PBBFAC | Performed by: NURSE PRACTITIONER

## 2019-07-24 PROCEDURE — 99999 PR PBB SHADOW E&M-EST. PATIENT-LVL III: ICD-10-PCS | Mod: PBBFAC,,, | Performed by: NURSE PRACTITIONER

## 2019-07-24 PROCEDURE — 99214 PR OFFICE/OUTPT VISIT, EST, LEVL IV, 30-39 MIN: ICD-10-PCS | Mod: S$PBB,,, | Performed by: NURSE PRACTITIONER

## 2019-07-24 NOTE — ASSESSMENT & PLAN NOTE
-- Reviewed goals of therapy are to get the best control we can without hypoglycemia  Continue amaryl daily & Trulicity 1.5mg weekly. Discussed MOA & SE   -- Advised frequent self blood glucose monitoring.  Patient encouraged to document glucose results and bring them to every clinic visit    -- Hypoglycemia precautions discussed. Instructed on precautions before driving.    -- Call for Bg repeatedly < 90 or > 180.   -- Close adherence to lifestyle changes recommended.   -- Periodic follow ups for eye evaluations, foot care and dental care suggested.

## 2019-08-18 ENCOUNTER — HOSPITAL ENCOUNTER (EMERGENCY)
Facility: HOSPITAL | Age: 64
Discharge: HOME | End: 2019-08-18
Attending: EMERGENCY MEDICINE
Payer: MEDICARE

## 2019-08-18 VITALS
HEART RATE: 69 BPM | SYSTOLIC BLOOD PRESSURE: 137 MMHG | OXYGEN SATURATION: 98 % | HEIGHT: 66 IN | DIASTOLIC BLOOD PRESSURE: 65 MMHG | RESPIRATION RATE: 14 BRPM | BODY MASS INDEX: 42.27 KG/M2 | TEMPERATURE: 99 F | WEIGHT: 263 LBS

## 2019-08-18 DIAGNOSIS — R10.9 ABDOMINAL PAIN: ICD-10-CM

## 2019-08-18 DIAGNOSIS — R07.9 CHEST PAIN: ICD-10-CM

## 2019-08-18 LAB
ALBUMIN SERPL BCP-MCNC: 3.6 G/DL (ref 3.5–5.2)
ALP SERPL-CCNC: 126 U/L (ref 55–135)
ALT SERPL W/O P-5'-P-CCNC: 19 U/L (ref 10–44)
ANION GAP SERPL CALC-SCNC: 8 MMOL/L (ref 8–16)
AST SERPL-CCNC: 20 U/L (ref 10–40)
BASOPHILS # BLD AUTO: 0.03 K/UL (ref 0–0.2)
BASOPHILS NFR BLD: 0.3 % (ref 0–1.9)
BILIRUB SERPL-MCNC: 1 MG/DL (ref 0.1–1)
BILIRUB UR QL STRIP: NEGATIVE
BNP SERPL-MCNC: 124 PG/ML (ref 0–99)
BUN SERPL-MCNC: 10 MG/DL (ref 8–23)
CALCIUM SERPL-MCNC: 9.7 MG/DL (ref 8.7–10.5)
CHLORIDE SERPL-SCNC: 103 MMOL/L (ref 95–110)
CLARITY UR REFRACT.AUTO: CLEAR
CO2 SERPL-SCNC: 30 MMOL/L (ref 23–29)
COLOR UR AUTO: YELLOW
CREAT SERPL-MCNC: 1.1 MG/DL (ref 0.5–1.4)
DIFFERENTIAL METHOD: NORMAL
EOSINOPHIL # BLD AUTO: 0.1 K/UL (ref 0–0.5)
EOSINOPHIL NFR BLD: 1.1 % (ref 0–8)
ERYTHROCYTE [DISTWIDTH] IN BLOOD BY AUTOMATED COUNT: 12.7 % (ref 11.5–14.5)
EST. GFR  (AFRICAN AMERICAN): >60 ML/MIN/1.73 M^2
EST. GFR  (NON AFRICAN AMERICAN): 52.4 ML/MIN/1.73 M^2
GLUCOSE SERPL-MCNC: 98 MG/DL (ref 70–110)
GLUCOSE UR QL STRIP: NEGATIVE
HCT VFR BLD AUTO: 41 % (ref 37–48.5)
HGB BLD-MCNC: 13.5 G/DL (ref 12–16)
HGB UR QL STRIP: NEGATIVE
IMM GRANULOCYTES # BLD AUTO: 0.04 K/UL (ref 0–0.04)
IMM GRANULOCYTES NFR BLD AUTO: 0.4 % (ref 0–0.5)
KETONES UR QL STRIP: NEGATIVE
LACTATE SERPL-SCNC: 0.9 MMOL/L (ref 0.5–2.2)
LEUKOCYTE ESTERASE UR QL STRIP: NEGATIVE
LIPASE SERPL-CCNC: 20 U/L (ref 4–60)
LYMPHOCYTES # BLD AUTO: 1.9 K/UL (ref 1–4.8)
LYMPHOCYTES NFR BLD: 19.7 % (ref 18–48)
MAGNESIUM SERPL-MCNC: 1.8 MG/DL (ref 1.6–2.6)
MCH RBC QN AUTO: 29.9 PG (ref 27–31)
MCHC RBC AUTO-ENTMCNC: 32.9 G/DL (ref 32–36)
MCV RBC AUTO: 91 FL (ref 82–98)
MONOCYTES # BLD AUTO: 0.9 K/UL (ref 0.3–1)
MONOCYTES NFR BLD: 9.1 % (ref 4–15)
NEUTROPHILS # BLD AUTO: 6.6 K/UL (ref 1.8–7.7)
NEUTROPHILS NFR BLD: 69.4 % (ref 38–73)
NITRITE UR QL STRIP: NEGATIVE
NRBC BLD-RTO: 0 /100 WBC
PH UR STRIP: 8 [PH] (ref 5–8)
PLATELET # BLD AUTO: 246 K/UL (ref 150–350)
PMV BLD AUTO: 9.5 FL (ref 9.2–12.9)
POTASSIUM SERPL-SCNC: 4 MMOL/L (ref 3.5–5.1)
PROT SERPL-MCNC: 7.1 G/DL (ref 6–8.4)
PROT UR QL STRIP: NEGATIVE
RBC # BLD AUTO: 4.52 M/UL (ref 4–5.4)
SODIUM SERPL-SCNC: 141 MMOL/L (ref 136–145)
SP GR UR STRIP: 1.01 (ref 1–1.03)
TROPONIN I SERPL DL<=0.01 NG/ML-MCNC: 0.01 NG/ML (ref 0–0.03)
URN SPEC COLLECT METH UR: NORMAL
WBC # BLD AUTO: 9.45 K/UL (ref 3.9–12.7)

## 2019-08-18 PROCEDURE — 96374 THER/PROPH/DIAG INJ IV PUSH: CPT

## 2019-08-18 PROCEDURE — 81003 URINALYSIS AUTO W/O SCOPE: CPT

## 2019-08-18 PROCEDURE — 99284 PR EMERGENCY DEPT VISIT,LEVEL IV: ICD-10-PCS | Mod: ,,, | Performed by: EMERGENCY MEDICINE

## 2019-08-18 PROCEDURE — 83735 ASSAY OF MAGNESIUM: CPT

## 2019-08-18 PROCEDURE — 80053 COMPREHEN METABOLIC PANEL: CPT

## 2019-08-18 PROCEDURE — 99284 EMERGENCY DEPT VISIT MOD MDM: CPT | Mod: ,,, | Performed by: EMERGENCY MEDICINE

## 2019-08-18 PROCEDURE — 83690 ASSAY OF LIPASE: CPT

## 2019-08-18 PROCEDURE — 83880 ASSAY OF NATRIURETIC PEPTIDE: CPT

## 2019-08-18 PROCEDURE — 96361 HYDRATE IV INFUSION ADD-ON: CPT

## 2019-08-18 PROCEDURE — 99285 EMERGENCY DEPT VISIT HI MDM: CPT | Mod: 25

## 2019-08-18 PROCEDURE — 93010 ELECTROCARDIOGRAM REPORT: CPT | Mod: ,,, | Performed by: INTERNAL MEDICINE

## 2019-08-18 PROCEDURE — 83605 ASSAY OF LACTIC ACID: CPT

## 2019-08-18 PROCEDURE — 63600175 PHARM REV CODE 636 W HCPCS: Performed by: EMERGENCY MEDICINE

## 2019-08-18 PROCEDURE — 84484 ASSAY OF TROPONIN QUANT: CPT

## 2019-08-18 PROCEDURE — 85025 COMPLETE CBC W/AUTO DIFF WBC: CPT

## 2019-08-18 PROCEDURE — 96375 TX/PRO/DX INJ NEW DRUG ADDON: CPT

## 2019-08-18 PROCEDURE — 93010 EKG 12-LEAD: ICD-10-PCS | Mod: ,,, | Performed by: INTERNAL MEDICINE

## 2019-08-18 PROCEDURE — 93005 ELECTROCARDIOGRAM TRACING: CPT

## 2019-08-18 RX ORDER — KETOROLAC TROMETHAMINE 30 MG/ML
15 INJECTION, SOLUTION INTRAMUSCULAR; INTRAVENOUS
Status: COMPLETED | OUTPATIENT
Start: 2019-08-18 | End: 2019-08-18

## 2019-08-18 RX ORDER — ONDANSETRON 2 MG/ML
4 INJECTION INTRAMUSCULAR; INTRAVENOUS
Status: COMPLETED | OUTPATIENT
Start: 2019-08-18 | End: 2019-08-18

## 2019-08-18 RX ADMIN — KETOROLAC TROMETHAMINE 15 MG: 30 INJECTION, SOLUTION INTRAMUSCULAR at 07:08

## 2019-08-18 RX ADMIN — SODIUM CHLORIDE 1000 ML: 0.9 INJECTION, SOLUTION INTRAVENOUS at 07:08

## 2019-08-18 RX ADMIN — ONDANSETRON 4 MG: 2 INJECTION INTRAMUSCULAR; INTRAVENOUS at 07:08

## 2019-08-19 NOTE — ED NOTES
"Pt presents to ED with c/o non radiating chest pain that started around 0900. Pt states that the pain is midsternal in orientation, constant and "sharp". Pt denies SOB. Pt states that her CP is similar to the chest pain she had when she was dx with gallstones. Pt denies n/v, diarrhea or change in urinary pattern. Pt AAOx4 and ambulates independently.   "

## 2019-08-19 NOTE — ED NOTES
Patient identifiers verified and correct for [facility]  LOC: The patient is awake, alert and aware of environment with an appropriate affect, the patient is oriented x 3 and speaking appropriately.   APPEARANCE: Patient appears comfortable and in no acute distress, patient is clean and well groomed.  SKIN: The skin is warm and dry, color consistent with ethnicity, patient has normal skin turgor and moist mucus membranes, skin intact, no breakdown or bruising noted.   MUSCULOSKELETAL: Patient moving all extremities spontaneously, no swelling noted.  RESPIRATORY: Airway is open and patent, respirations are spontaneous, patient has a normal effort and rate, no accessory muscle use noted, pt placed on continuous pulse ox with O2 sats noted at 97% on room air.  CARDIAC: Pt placed on cardiac monitor. Patient has a normal rate and regular rhythm, no edema noted, capillary refill < 3 seconds.   GASTRO: Soft and non tender to palpation, no distention noted, normoactive bowel sounds present in all four quadrants. Pt states bowel movements have been regular.  : Pt denies any pain or frequency with urination.  NEURO: Pt opens eyes spontaneously, behavior appropriate to situation, follows commands, facial expression symmetrical, bilateral hand grasp equal and even, purposeful motor response noted, normal sensation in all extremities when touched with a finger.

## 2019-08-19 NOTE — ED PROVIDER NOTES
Encounter Date: 2019    SCRIBE #1 NOTE: I, Marlo Levy, am scribing for, and in the presence of,  Dr. Corey. I have scribed the entire note.       History     Chief Complaint   Patient presents with    Chest Pain     c/o constant, nonradiating, sharp sternal chest pain since 0900. States hx similar pain and was dx with gallstones.      Patient is a 64 y.o. Female with a medical history of: HTN, DJD, DM, and sleep apnea, who is presenting with a chief complaint of Chest Pain. Patient reports she has been having right sided chest pain beginning at 9:30 this morning and that it has been in the same spot all day.  She took OTC TUMS which did not relieve her symptoms. She said it feels like a sharp pain that feels like a stone in her chest. Patient confirms that she has been nauseous but denies any vomiting or SOB. Patient reports she has had her gallbladder removed and reports no out of the ordinary changes in her diet. Patient reports that she did have an episode of diarrhea yesterday but reports no other acute symptoms at this time.     The history is provided by the patient.     Review of patient's allergies indicates:   Allergen Reactions    Penicillin Hives    Penicillins      Other reaction(s): Rash    Vicodin  [hydrocodone-acetaminophen]      Other reaction(s): Unknown    Metformin Diarrhea     Diarrhea      Past Medical History:   Diagnosis Date    Bipolar 1 disorder     Cataract     Diabetes mellitus type 2 in obese 2018    Diabetes mellitus, type 2     DJD (degenerative joint disease)     Dysmetabolic syndrome     Hyperglycemia     Hypertension     Lumbar stenosis     Sleep apnea      Past Surgical History:   Procedure Laterality Date    BACK SURGERY       SECTION      CHOLECYSTECTOMY       Family History   Problem Relation Age of Onset    Cancer Mother         lung cancer    Cataracts Mother     COPD Mother     Cancer Father         lung cancer     Diabetes Maternal  Grandmother     No Known Problems Sister     No Known Problems Brother     No Known Problems Maternal Aunt     No Known Problems Maternal Uncle     No Known Problems Paternal Aunt     No Known Problems Paternal Uncle     No Known Problems Maternal Grandfather     No Known Problems Paternal Grandmother     No Known Problems Paternal Grandfather     Amblyopia Neg Hx     Blindness Neg Hx     Glaucoma Neg Hx     Hypertension Neg Hx     Macular degeneration Neg Hx     Retinal detachment Neg Hx     Strabismus Neg Hx     Stroke Neg Hx     Thyroid disease Neg Hx      Social History     Tobacco Use    Smoking status: Never Smoker    Smokeless tobacco: Never Used   Substance Use Topics    Alcohol use: No     Frequency: Never     Binge frequency: Never    Drug use: Not on file     Review of Systems   Constitutional: Negative for chills and fever.   HENT: Negative for ear pain and sore throat.    Respiratory: Negative for shortness of breath.    Cardiovascular: Positive for chest pain.   Gastrointestinal: Positive for diarrhea and nausea. Negative for blood in stool and vomiting.   Genitourinary: Negative for dysuria and hematuria.   Musculoskeletal: Negative for back pain.   Skin: Negative for rash.   Neurological: Negative for seizures and weakness.   All other systems reviewed and are negative.      Physical Exam     Initial Vitals [08/18/19 1907]   BP Pulse Resp Temp SpO2   (!) 140/90 86 16 99 °F (37.2 °C) 97 %      MAP       --         Physical Exam    Nursing note and vitals reviewed.  Constitutional: She appears well-developed and well-nourished. She is not diaphoretic. No distress.   HENT:   Head: Normocephalic and atraumatic.   Eyes: Conjunctivae and EOM are normal. Pupils are equal, round, and reactive to light.   Neck: Normal range of motion.   Cardiovascular: Normal rate, regular rhythm and normal heart sounds.   Abdominal: Soft. Bowel sounds are normal. She exhibits no distension. There is  tenderness. There is no rebound and no guarding.   RUQ tenderness with no rebound or guarding. No CVA tenderness present.    Musculoskeletal: Normal range of motion. She exhibits no edema or tenderness.   Neurological: She is alert and oriented to person, place, and time. GCS score is 15. GCS eye subscore is 4. GCS verbal subscore is 5. GCS motor subscore is 6.   Skin: Skin is warm and dry.         ED Course   Procedures  Labs Reviewed   COMPREHENSIVE METABOLIC PANEL - Abnormal; Notable for the following components:       Result Value    CO2 30 (*)     eGFR if non  52.4 (*)     All other components within normal limits   B-TYPE NATRIURETIC PEPTIDE - Abnormal; Notable for the following components:     (*)     All other components within normal limits   CBC W/ AUTO DIFFERENTIAL   LIPASE   MAGNESIUM   URINALYSIS, REFLEX TO URINE CULTURE    Narrative:     Preferred Collection Type->Urine, Clean Catch  YELLOW,GRAY AND ORANGE TOP   TROPONIN I   LACTIC ACID, PLASMA     EKG Readings: (Independently Interpreted)   Initial Reading: No STEMI. Rhythm: Normal Sinus Rhythm. Heart Rate: 80. ST Segments: Normal ST Segments. T Waves: Normal.       Imaging Results          X-Ray Chest PA And Lateral (Final result)  Result time 08/18/19 21:25:41    Final result by Hector Swenson MD (08/18/19 21:25:41)                 Impression:      Normal.      Electronically signed by: Hector Swenson  Date:    08/18/2019  Time:    21:25             Narrative:    EXAMINATION:  XR CHEST PA AND LATERAL    CLINICAL HISTORY:  Unspecified abdominal pain    TECHNIQUE:  PA and lateral views of the chest were performed.    COMPARISON:  None    FINDINGS:  Frontal lateral views presented.  Heart and lungs are normal.  Trachea and hilar shadows appear normal.  No pneumothorax or pleural effusion or interstitial edema.  Visualized spine appears intact.  No abdominal free air.                              X-Rays:   Independently  Interpreted Readings:   Chest X-Ray: Normal heart size.  No infiltrates.  No acute abnormalities.     Medical Decision Making:   History:   Old Medical Records: I decided to obtain old medical records.  Independently Interpreted Test(s):   I have ordered and independently interpreted EKG Reading(s) - see prior notes  Clinical Tests:   Lab Tests: Ordered and Reviewed  Radiological Study: Ordered and Reviewed  Medical Tests: Ordered and Reviewed  ED Management:  Emergent evaluation of right upper quadrant pain. Description this does not seem like chest pain. Patient has had diarrhea.  Cholecystectomy in the past.  Exam is minimal tenderness. Low suspicion for a significant intra-abdominal process.  No indication for imaging at this time.  Will get lab work give medications and reassess.            Scribe Attestation:   Scribe #1: I performed the above scribed service and the documentation accurately describes the services I performed. I attest to the accuracy of the note.    Attending Attestation:             Attending ED Notes:   Patient reports complete resolution of her symptoms after ED therapy.  Lab work is unremarkable. Chest x-ray normal. Feel the symptoms are likely related to the diarrhea that she had yesterday, may be early viral enteritis.  Will discharge with strict return precautions, follow-up with PCP.             Clinical Impression:       ICD-10-CM ICD-9-CM   1. Chest pain R07.9 786.50   2. Abdominal pain R10.9 789.00         Disposition:   Disposition: Discharged  Condition: Stable                        Ivonne Corey MD  08/18/19 [id number]

## 2020-03-05 RX ORDER — ATORVASTATIN CALCIUM 10 MG/1
TABLET, FILM COATED ORAL
Qty: 90 TABLET | Refills: 0 | Status: SHIPPED | OUTPATIENT
Start: 2020-03-05 | End: 2020-06-06

## 2020-03-20 RX ORDER — LOSARTAN POTASSIUM 100 MG/1
TABLET ORAL
Qty: 90 TABLET | Refills: 3 | Status: SHIPPED | OUTPATIENT
Start: 2020-03-20 | End: 2020-12-07

## 2020-04-17 DIAGNOSIS — E66.9 DIABETES MELLITUS TYPE 2 IN OBESE: ICD-10-CM

## 2020-04-17 DIAGNOSIS — E11.69 DIABETES MELLITUS TYPE 2 IN OBESE: ICD-10-CM

## 2020-04-17 RX ORDER — HYDROCHLOROTHIAZIDE 25 MG/1
TABLET ORAL
Qty: 90 TABLET | Refills: 3 | Status: SHIPPED | OUTPATIENT
Start: 2020-04-17 | End: 2021-04-01 | Stop reason: SDUPTHER

## 2020-07-06 NOTE — TELEPHONE ENCOUNTER
----- Message from Teri Bartlett sent at 7/6/2020  2:35 PM CDT -----  Regarding: pt  Pt is calling to speak with the nurse pt said [facility] needs a code for her test strips can you please call pt at [phone]. Pt is waiting at the pharmacy to get her strips pt said she is down to one     SANNA

## 2020-07-09 DIAGNOSIS — E66.9 DIABETES MELLITUS TYPE 2 IN OBESE: Primary | ICD-10-CM

## 2020-07-09 DIAGNOSIS — E11.69 DIABETES MELLITUS TYPE 2 IN OBESE: Primary | ICD-10-CM

## 2020-07-14 DIAGNOSIS — E11.69 DIABETES MELLITUS TYPE 2 IN OBESE: ICD-10-CM

## 2020-07-14 DIAGNOSIS — E66.9 DIABETES MELLITUS TYPE 2 IN OBESE: ICD-10-CM

## 2020-07-14 NOTE — TELEPHONE ENCOUNTER
----- Message from Ann Thrasher sent at 7/14/2020  9:45 AM CDT -----  Pt states she would like to speak with nurse to her medications please call back to discuss

## 2020-07-17 DIAGNOSIS — Z71.89 COMPLEX CARE COORDINATION: ICD-10-CM

## 2020-09-29 ENCOUNTER — PATIENT MESSAGE (OUTPATIENT)
Dept: OTHER | Facility: OTHER | Age: 65
End: 2020-09-29

## 2020-10-05 ENCOUNTER — PATIENT MESSAGE (OUTPATIENT)
Dept: ADMINISTRATIVE | Facility: HOSPITAL | Age: 65
End: 2020-10-05

## 2020-10-27 ENCOUNTER — PATIENT MESSAGE (OUTPATIENT)
Dept: ADMINISTRATIVE | Facility: HOSPITAL | Age: 65
End: 2020-10-27

## 2020-10-27 ENCOUNTER — PATIENT OUTREACH (OUTPATIENT)
Dept: ADMINISTRATIVE | Facility: HOSPITAL | Age: 65
End: 2020-10-27

## 2020-10-27 NOTE — PROGRESS NOTES
Health Maintenance Due   Topic Date Due    TETANUS VACCINE  02/07/1973    Shingles Vaccine (1 of 2) 02/07/2003    Colorectal Cancer Screening  07/12/2019    Hemoglobin A1c  10/08/2019    Foot Exam  01/30/2020    Lipid Panel  01/31/2020    Eye Exam  05/28/2020    Influenza Vaccine (1) 08/01/2020    Mammogram  11/16/2020     I called pt, but was unable to LM.  Portal message sent asking pt to call back to schedule her annual visit & labs with Dr. Caryn Ambrosio.  Chart review completed.

## 2020-11-06 ENCOUNTER — PATIENT OUTREACH (OUTPATIENT)
Dept: ADMINISTRATIVE | Facility: HOSPITAL | Age: 65
End: 2020-11-06

## 2020-11-06 NOTE — PROGRESS NOTES
Chart Auditing Tool   Topic Results    Care Everywhere completed    Reconcile Immunizations completed    Care Team completed    Care Coordination Note/Care Mgt Note completed    HM Review completed    Orders Placed completed    Outreach Performed completed    Appointments scheduled Unable to completed     Chart Audit Complete: Notes:  Lotus Vaz LPN  Lead Clinical Care Coordinator   [facility]

## 2020-12-11 ENCOUNTER — PATIENT MESSAGE (OUTPATIENT)
Dept: OTHER | Facility: OTHER | Age: 65
End: 2020-12-11

## 2020-12-14 ENCOUNTER — TELEPHONE (OUTPATIENT)
Dept: ADMINISTRATIVE | Facility: HOSPITAL | Age: 65
End: 2020-12-14

## 2020-12-14 ENCOUNTER — PATIENT OUTREACH (OUTPATIENT)
Dept: ADMINISTRATIVE | Facility: HOSPITAL | Age: 65
End: 2020-12-14

## 2021-01-04 ENCOUNTER — PATIENT MESSAGE (OUTPATIENT)
Dept: ADMINISTRATIVE | Facility: HOSPITAL | Age: 66
End: 2021-01-04

## 2021-03-30 DIAGNOSIS — E11.69 DIABETES MELLITUS TYPE 2 IN OBESE: ICD-10-CM

## 2021-03-30 DIAGNOSIS — E66.9 DIABETES MELLITUS TYPE 2 IN OBESE: ICD-10-CM

## 2021-03-30 RX ORDER — BLOOD-GLUCOSE METER
EACH MISCELLANEOUS
Qty: 200 STRIP | Refills: 3 | Status: SHIPPED | OUTPATIENT
Start: 2021-03-30 | End: 2023-04-26 | Stop reason: SDUPTHER

## 2021-03-30 RX ORDER — BLOOD-GLUCOSE METER
EACH MISCELLANEOUS
Qty: 200 STRIP | Refills: 3 | Status: SHIPPED | OUTPATIENT
Start: 2021-03-30 | End: 2021-03-30 | Stop reason: SDUPTHER

## 2021-03-30 RX ORDER — ISOPROPYL ALCOHOL 70 ML/100ML
1 SWAB TOPICAL
Qty: 100 EACH | Refills: 1 | COMMUNITY
Start: 2021-03-30 | End: 2023-05-10 | Stop reason: SDUPTHER

## 2021-03-30 RX ORDER — ATORVASTATIN CALCIUM 10 MG/1
10 TABLET, FILM COATED ORAL DAILY
Qty: 90 TABLET | Refills: 0 | Status: SHIPPED | OUTPATIENT
Start: 2021-03-30 | End: 2021-04-01 | Stop reason: SDUPTHER

## 2021-03-31 ENCOUNTER — EXTERNAL CHRONIC CARE MANAGEMENT (OUTPATIENT)
Dept: PRIMARY CARE CLINIC | Facility: CLINIC | Age: 66
End: 2021-03-31
Payer: MEDICARE

## 2021-03-31 PROCEDURE — 99490 CHRNC CARE MGMT STAFF 1ST 20: CPT | Mod: PBBFAC | Performed by: INTERNAL MEDICINE

## 2021-03-31 PROCEDURE — 99490 CHRNC CARE MGMT STAFF 1ST 20: CPT | Mod: S$GLB,,, | Performed by: INTERNAL MEDICINE

## 2021-03-31 PROCEDURE — 99490 PR CHRONIC CARE MGMT, 1ST 20 MIN: ICD-10-PCS | Mod: S$GLB,,, | Performed by: INTERNAL MEDICINE

## 2021-04-01 RX ORDER — LOSARTAN POTASSIUM 100 MG/1
100 TABLET ORAL DAILY
Qty: 90 TABLET | Refills: 0 | Status: SHIPPED | OUTPATIENT
Start: 2021-04-01 | End: 2021-07-01

## 2021-04-01 RX ORDER — HYDROCHLOROTHIAZIDE 25 MG/1
25 TABLET ORAL DAILY
Qty: 90 TABLET | Refills: 3 | Status: SHIPPED | OUTPATIENT
Start: 2021-04-01 | End: 2022-05-23 | Stop reason: SDUPTHER

## 2021-04-01 RX ORDER — ATORVASTATIN CALCIUM 10 MG/1
10 TABLET, FILM COATED ORAL DAILY
Qty: 90 TABLET | Refills: 0 | Status: SHIPPED | OUTPATIENT
Start: 2021-04-01 | End: 2021-08-24

## 2021-04-05 ENCOUNTER — PATIENT MESSAGE (OUTPATIENT)
Dept: ADMINISTRATIVE | Facility: HOSPITAL | Age: 66
End: 2021-04-05

## 2021-04-16 ENCOUNTER — PATIENT MESSAGE (OUTPATIENT)
Dept: INTERNAL MEDICINE | Facility: CLINIC | Age: 66
End: 2021-04-16

## 2021-04-16 DIAGNOSIS — E11.69 DIABETES MELLITUS TYPE 2 IN OBESE: ICD-10-CM

## 2021-04-16 DIAGNOSIS — E66.9 DIABETES MELLITUS TYPE 2 IN OBESE: ICD-10-CM

## 2021-04-16 RX ORDER — GLIMEPIRIDE 4 MG/1
4 TABLET ORAL
Qty: 90 TABLET | Refills: 1 | Status: SHIPPED | OUTPATIENT
Start: 2021-04-16 | End: 2021-04-16 | Stop reason: SDUPTHER

## 2021-04-16 RX ORDER — GLIMEPIRIDE 4 MG/1
4 TABLET ORAL
Qty: 90 TABLET | Refills: 1 | Status: SHIPPED | OUTPATIENT
Start: 2021-04-16 | End: 2021-08-24

## 2021-04-30 ENCOUNTER — EXTERNAL CHRONIC CARE MANAGEMENT (OUTPATIENT)
Dept: PRIMARY CARE CLINIC | Facility: CLINIC | Age: 66
End: 2021-04-30
Payer: MEDICARE

## 2021-04-30 PROCEDURE — 99490 PR CHRONIC CARE MGMT, 1ST 20 MIN: ICD-10-PCS | Mod: S$GLB,,, | Performed by: INTERNAL MEDICINE

## 2021-04-30 PROCEDURE — 99490 CHRNC CARE MGMT STAFF 1ST 20: CPT | Mod: S$GLB,,, | Performed by: INTERNAL MEDICINE

## 2021-04-30 PROCEDURE — 99490 CHRNC CARE MGMT STAFF 1ST 20: CPT | Mod: PBBFAC | Performed by: INTERNAL MEDICINE

## 2021-05-15 ENCOUNTER — PATIENT MESSAGE (OUTPATIENT)
Dept: INTERNAL MEDICINE | Facility: CLINIC | Age: 66
End: 2021-05-15

## 2021-05-17 RX ORDER — UBIDECARENONE 75 MG
500 CAPSULE ORAL DAILY
COMMUNITY
Start: 2019-11-05 | End: 2023-12-08

## 2021-05-17 RX ORDER — CARVEDILOL 12.5 MG/1
12.5 TABLET ORAL 2 TIMES DAILY WITH MEALS
Qty: 180 TABLET | Refills: 1 | Status: SHIPPED | OUTPATIENT
Start: 2021-05-17 | End: 2021-08-24

## 2021-05-31 ENCOUNTER — EXTERNAL CHRONIC CARE MANAGEMENT (OUTPATIENT)
Dept: PRIMARY CARE CLINIC | Facility: CLINIC | Age: 66
End: 2021-05-31
Payer: MEDICARE

## 2021-05-31 PROCEDURE — 99490 CHRNC CARE MGMT STAFF 1ST 20: CPT | Mod: S$GLB,,, | Performed by: INTERNAL MEDICINE

## 2021-05-31 PROCEDURE — 99490 PR CHRONIC CARE MGMT, 1ST 20 MIN: ICD-10-PCS | Mod: S$GLB,,, | Performed by: INTERNAL MEDICINE

## 2021-06-30 ENCOUNTER — EXTERNAL CHRONIC CARE MANAGEMENT (OUTPATIENT)
Dept: PRIMARY CARE CLINIC | Facility: CLINIC | Age: 66
End: 2021-06-30
Payer: MEDICARE

## 2021-06-30 PROCEDURE — 99490 CHRNC CARE MGMT STAFF 1ST 20: CPT | Mod: S$GLB,,, | Performed by: INTERNAL MEDICINE

## 2021-06-30 PROCEDURE — 99490 PR CHRONIC CARE MGMT, 1ST 20 MIN: ICD-10-PCS | Mod: S$GLB,,, | Performed by: INTERNAL MEDICINE

## 2021-07-07 ENCOUNTER — PATIENT MESSAGE (OUTPATIENT)
Dept: ADMINISTRATIVE | Facility: HOSPITAL | Age: 66
End: 2021-07-07

## 2021-07-31 ENCOUNTER — EXTERNAL CHRONIC CARE MANAGEMENT (OUTPATIENT)
Dept: PRIMARY CARE CLINIC | Facility: CLINIC | Age: 66
End: 2021-07-31
Payer: MEDICARE

## 2021-07-31 PROCEDURE — 99490 PR CHRONIC CARE MGMT, 1ST 20 MIN: ICD-10-PCS | Mod: S$GLB,,, | Performed by: INTERNAL MEDICINE

## 2021-07-31 PROCEDURE — 99490 CHRNC CARE MGMT STAFF 1ST 20: CPT | Mod: S$GLB,,, | Performed by: INTERNAL MEDICINE

## 2021-08-24 DIAGNOSIS — E11.69 DIABETES MELLITUS TYPE 2 IN OBESE: ICD-10-CM

## 2021-08-24 DIAGNOSIS — E66.9 DIABETES MELLITUS TYPE 2 IN OBESE: ICD-10-CM

## 2021-08-24 RX ORDER — GLIMEPIRIDE 4 MG/1
4 TABLET ORAL
Qty: 90 TABLET | Refills: 1 | Status: SHIPPED | OUTPATIENT
Start: 2021-08-24 | End: 2022-02-04

## 2021-08-24 RX ORDER — LOSARTAN POTASSIUM 100 MG/1
TABLET ORAL
Qty: 90 TABLET | Refills: 0 | Status: SHIPPED | OUTPATIENT
Start: 2021-08-24 | End: 2021-12-22 | Stop reason: SDUPTHER

## 2021-08-24 RX ORDER — DULAGLUTIDE 1.5 MG/.5ML
INJECTION, SOLUTION SUBCUTANEOUS
Qty: 12 PEN | Refills: 1 | Status: SHIPPED | OUTPATIENT
Start: 2021-08-24 | End: 2021-09-08 | Stop reason: SDUPTHER

## 2021-08-24 RX ORDER — CARVEDILOL 12.5 MG/1
TABLET ORAL
Qty: 180 TABLET | Refills: 1 | Status: SHIPPED | OUTPATIENT
Start: 2021-08-24 | End: 2022-05-23 | Stop reason: SDUPTHER

## 2021-08-24 RX ORDER — ATORVASTATIN CALCIUM 10 MG/1
TABLET, FILM COATED ORAL
Qty: 90 TABLET | Refills: 0 | Status: SHIPPED | OUTPATIENT
Start: 2021-08-24 | End: 2021-12-22 | Stop reason: SDUPTHER

## 2021-09-08 DIAGNOSIS — E66.9 DIABETES MELLITUS TYPE 2 IN OBESE: ICD-10-CM

## 2021-09-08 DIAGNOSIS — E11.69 DIABETES MELLITUS TYPE 2 IN OBESE: ICD-10-CM

## 2021-09-08 RX ORDER — DULAGLUTIDE 1.5 MG/.5ML
1.5 INJECTION, SOLUTION SUBCUTANEOUS WEEKLY
Qty: 12 PEN | Refills: 1 | Status: SHIPPED | OUTPATIENT
Start: 2021-09-08 | End: 2021-09-24 | Stop reason: SDUPTHER

## 2021-09-24 DIAGNOSIS — E11.69 DIABETES MELLITUS TYPE 2 IN OBESE: ICD-10-CM

## 2021-09-24 DIAGNOSIS — E66.9 DIABETES MELLITUS TYPE 2 IN OBESE: ICD-10-CM

## 2021-09-25 RX ORDER — DULAGLUTIDE 1.5 MG/.5ML
1.5 INJECTION, SOLUTION SUBCUTANEOUS WEEKLY
Qty: 12 PEN | Refills: 1 | Status: SHIPPED | OUTPATIENT
Start: 2021-09-25 | End: 2021-09-27 | Stop reason: SDUPTHER

## 2021-09-27 DIAGNOSIS — E11.69 DIABETES MELLITUS TYPE 2 IN OBESE: ICD-10-CM

## 2021-09-27 DIAGNOSIS — E66.9 DIABETES MELLITUS TYPE 2 IN OBESE: ICD-10-CM

## 2021-09-27 RX ORDER — DULAGLUTIDE 1.5 MG/.5ML
1.5 INJECTION, SOLUTION SUBCUTANEOUS WEEKLY
Qty: 12 PEN | Refills: 0 | Status: SHIPPED | OUTPATIENT
Start: 2021-09-27 | End: 2021-09-30 | Stop reason: SDUPTHER

## 2021-09-30 DIAGNOSIS — E66.9 DIABETES MELLITUS TYPE 2 IN OBESE: ICD-10-CM

## 2021-09-30 DIAGNOSIS — E11.69 DIABETES MELLITUS TYPE 2 IN OBESE: ICD-10-CM

## 2021-09-30 RX ORDER — DULAGLUTIDE 1.5 MG/.5ML
1.5 INJECTION, SOLUTION SUBCUTANEOUS WEEKLY
Qty: 12 PEN | Refills: 0 | Status: SHIPPED | OUTPATIENT
Start: 2021-09-30 | End: 2022-03-02

## 2021-10-04 ENCOUNTER — PATIENT MESSAGE (OUTPATIENT)
Dept: ADMINISTRATIVE | Facility: HOSPITAL | Age: 66
End: 2021-10-04

## 2021-12-22 RX ORDER — LOSARTAN POTASSIUM 100 MG/1
TABLET ORAL
Qty: 90 TABLET | Refills: 0 | Status: SHIPPED | OUTPATIENT
Start: 2021-12-22 | End: 2022-03-23

## 2021-12-22 RX ORDER — ATORVASTATIN CALCIUM 10 MG/1
TABLET, FILM COATED ORAL
Qty: 90 TABLET | Refills: 0 | Status: SHIPPED | OUTPATIENT
Start: 2021-12-22 | End: 2022-02-04

## 2022-01-26 ENCOUNTER — PATIENT MESSAGE (OUTPATIENT)
Dept: ADMINISTRATIVE | Facility: HOSPITAL | Age: 67
End: 2022-01-26
Payer: MEDICARE

## 2022-02-04 RX ORDER — ATORVASTATIN CALCIUM 10 MG/1
TABLET, FILM COATED ORAL
Qty: 90 TABLET | Refills: 0 | Status: SHIPPED | OUTPATIENT
Start: 2022-02-04 | End: 2022-05-23 | Stop reason: SDUPTHER

## 2022-02-04 RX ORDER — GLIMEPIRIDE 4 MG/1
4 TABLET ORAL
Qty: 90 TABLET | Refills: 0 | Status: SHIPPED | OUTPATIENT
Start: 2022-02-04 | End: 2022-05-23 | Stop reason: SDUPTHER

## 2022-02-04 NOTE — TELEPHONE ENCOUNTER
No new care gaps identified.  Powered by [employer]. Reference number: [id number].   2/04/2022 12:04:01 PM CST

## 2022-03-16 ENCOUNTER — PATIENT MESSAGE (OUTPATIENT)
Dept: ADMINISTRATIVE | Facility: HOSPITAL | Age: 67
End: 2022-03-16
Payer: MEDICARE

## 2022-05-23 ENCOUNTER — OFFICE VISIT (OUTPATIENT)
Dept: INTERNAL MEDICINE | Facility: CLINIC | Age: 67
End: 2022-05-23
Payer: MEDICARE

## 2022-05-23 VITALS
DIASTOLIC BLOOD PRESSURE: 70 MMHG | WEIGHT: 249.56 LBS | SYSTOLIC BLOOD PRESSURE: 160 MMHG | BODY MASS INDEX: 40.11 KG/M2 | HEIGHT: 66 IN | HEART RATE: 62 BPM

## 2022-05-23 DIAGNOSIS — E11.59 HYPERTENSION ASSOCIATED WITH DIABETES: ICD-10-CM

## 2022-05-23 DIAGNOSIS — E11.69 HYPERLIPIDEMIA ASSOCIATED WITH TYPE 2 DIABETES MELLITUS: Primary | ICD-10-CM

## 2022-05-23 DIAGNOSIS — E11.69 DIABETES MELLITUS TYPE 2 IN OBESE: ICD-10-CM

## 2022-05-23 DIAGNOSIS — Z12.11 COLON CANCER SCREENING: ICD-10-CM

## 2022-05-23 DIAGNOSIS — Z23 NEED FOR SHINGLES VACCINE: ICD-10-CM

## 2022-05-23 DIAGNOSIS — Z78.0 POSTMENOPAUSAL ESTROGEN DEFICIENCY: ICD-10-CM

## 2022-05-23 DIAGNOSIS — I15.2 HYPERTENSION ASSOCIATED WITH DIABETES: ICD-10-CM

## 2022-05-23 DIAGNOSIS — E66.9 DIABETES MELLITUS TYPE 2 IN OBESE: ICD-10-CM

## 2022-05-23 DIAGNOSIS — E78.5 HYPERLIPIDEMIA ASSOCIATED WITH TYPE 2 DIABETES MELLITUS: Primary | ICD-10-CM

## 2022-05-23 DIAGNOSIS — E66.01 MORBIDLY OBESE: ICD-10-CM

## 2022-05-23 DIAGNOSIS — Z86.010 PERSONAL HISTORY OF COLONIC POLYPS: ICD-10-CM

## 2022-05-23 DIAGNOSIS — E78.00 HYPERCHOLESTEROLEMIA: ICD-10-CM

## 2022-05-23 PROBLEM — N20.0 CALCULUS OF KIDNEY: Status: ACTIVE | Noted: 2022-05-23

## 2022-05-23 PROBLEM — K82.9 GALLBLADDER PROBLEM: Status: ACTIVE | Noted: 2022-05-23

## 2022-05-23 PROBLEM — R32 URINARY INCONTINENCE: Status: ACTIVE | Noted: 2022-05-23

## 2022-05-23 PROCEDURE — 1126F PR PAIN SEVERITY QUANTIFIED, NO PAIN PRESENT: ICD-10-PCS | Mod: CPTII,S$GLB,, | Performed by: NURSE PRACTITIONER

## 2022-05-23 PROCEDURE — 3288F FALL RISK ASSESSMENT DOCD: CPT | Mod: CPTII,S$GLB,, | Performed by: NURSE PRACTITIONER

## 2022-05-23 PROCEDURE — 1160F PR REVIEW ALL MEDS BY PRESCRIBER/CLIN PHARMACIST DOCUMENTED: ICD-10-PCS | Mod: CPTII,S$GLB,, | Performed by: NURSE PRACTITIONER

## 2022-05-23 PROCEDURE — 1126F AMNT PAIN NOTED NONE PRSNT: CPT | Mod: CPTII,S$GLB,, | Performed by: NURSE PRACTITIONER

## 2022-05-23 PROCEDURE — 3078F DIAST BP <80 MM HG: CPT | Mod: CPTII,S$GLB,, | Performed by: NURSE PRACTITIONER

## 2022-05-23 PROCEDURE — 3077F PR MOST RECENT SYSTOLIC BLOOD PRESSURE >= 140 MM HG: ICD-10-PCS | Mod: CPTII,S$GLB,, | Performed by: NURSE PRACTITIONER

## 2022-05-23 PROCEDURE — 99999 PR PBB SHADOW E&M-EST. PATIENT-LVL IV: CPT | Mod: PBBFAC,,, | Performed by: NURSE PRACTITIONER

## 2022-05-23 PROCEDURE — 99214 OFFICE O/P EST MOD 30 MIN: CPT | Mod: S$GLB,,, | Performed by: NURSE PRACTITIONER

## 2022-05-23 PROCEDURE — 4010F ACE/ARB THERAPY RXD/TAKEN: CPT | Mod: CPTII,S$GLB,, | Performed by: NURSE PRACTITIONER

## 2022-05-23 PROCEDURE — 99214 PR OFFICE/OUTPT VISIT, EST, LEVL IV, 30-39 MIN: ICD-10-PCS | Mod: S$GLB,,, | Performed by: NURSE PRACTITIONER

## 2022-05-23 PROCEDURE — 3008F BODY MASS INDEX DOCD: CPT | Mod: CPTII,S$GLB,, | Performed by: NURSE PRACTITIONER

## 2022-05-23 PROCEDURE — 3008F PR BODY MASS INDEX (BMI) DOCUMENTED: ICD-10-PCS | Mod: CPTII,S$GLB,, | Performed by: NURSE PRACTITIONER

## 2022-05-23 PROCEDURE — 4010F PR ACE/ARB THEARPY RXD/TAKEN: ICD-10-PCS | Mod: CPTII,S$GLB,, | Performed by: NURSE PRACTITIONER

## 2022-05-23 PROCEDURE — 1101F PR PT FALLS ASSESS DOC 0-1 FALLS W/OUT INJ PAST YR: ICD-10-PCS | Mod: CPTII,S$GLB,, | Performed by: NURSE PRACTITIONER

## 2022-05-23 PROCEDURE — 99999 PR PBB SHADOW E&M-EST. PATIENT-LVL IV: ICD-10-PCS | Mod: PBBFAC,,, | Performed by: NURSE PRACTITIONER

## 2022-05-23 PROCEDURE — 1159F PR MEDICATION LIST DOCUMENTED IN MEDICAL RECORD: ICD-10-PCS | Mod: CPTII,S$GLB,, | Performed by: NURSE PRACTITIONER

## 2022-05-23 PROCEDURE — 3078F PR MOST RECENT DIASTOLIC BLOOD PRESSURE < 80 MM HG: ICD-10-PCS | Mod: CPTII,S$GLB,, | Performed by: NURSE PRACTITIONER

## 2022-05-23 PROCEDURE — 3288F PR FALLS RISK ASSESSMENT DOCUMENTED: ICD-10-PCS | Mod: CPTII,S$GLB,, | Performed by: NURSE PRACTITIONER

## 2022-05-23 PROCEDURE — 1160F RVW MEDS BY RX/DR IN RCRD: CPT | Mod: CPTII,S$GLB,, | Performed by: NURSE PRACTITIONER

## 2022-05-23 PROCEDURE — 1159F MED LIST DOCD IN RCRD: CPT | Mod: CPTII,S$GLB,, | Performed by: NURSE PRACTITIONER

## 2022-05-23 PROCEDURE — 1101F PT FALLS ASSESS-DOCD LE1/YR: CPT | Mod: CPTII,S$GLB,, | Performed by: NURSE PRACTITIONER

## 2022-05-23 PROCEDURE — 3077F SYST BP >= 140 MM HG: CPT | Mod: CPTII,S$GLB,, | Performed by: NURSE PRACTITIONER

## 2022-05-23 RX ORDER — DULAGLUTIDE 1.5 MG/.5ML
INJECTION, SOLUTION SUBCUTANEOUS
Qty: 12 PEN | Refills: 3 | Status: SHIPPED | OUTPATIENT
Start: 2022-05-23 | End: 2022-06-13

## 2022-05-23 RX ORDER — GLIMEPIRIDE 4 MG/1
4 TABLET ORAL
Qty: 90 TABLET | Refills: 3 | Status: SHIPPED | OUTPATIENT
Start: 2022-05-23 | End: 2023-04-06

## 2022-05-23 RX ORDER — ATORVASTATIN CALCIUM 10 MG/1
10 TABLET, FILM COATED ORAL DAILY
Qty: 90 TABLET | Refills: 3 | Status: SHIPPED | OUTPATIENT
Start: 2022-05-23 | End: 2023-04-04

## 2022-05-23 RX ORDER — LOSARTAN POTASSIUM 100 MG/1
100 TABLET ORAL DAILY
Qty: 90 TABLET | Refills: 3 | Status: SHIPPED | OUTPATIENT
Start: 2022-05-23 | End: 2023-04-17

## 2022-05-23 RX ORDER — CARVEDILOL 12.5 MG/1
12.5 TABLET ORAL 2 TIMES DAILY WITH MEALS
Qty: 180 TABLET | Refills: 3 | Status: SHIPPED | OUTPATIENT
Start: 2022-05-23 | End: 2023-04-06

## 2022-05-23 RX ORDER — HYDROCHLOROTHIAZIDE 25 MG/1
25 TABLET ORAL DAILY
Qty: 90 TABLET | Refills: 3 | Status: SHIPPED | OUTPATIENT
Start: 2022-05-23 | End: 2023-04-23

## 2022-05-23 NOTE — PATIENT INSTRUCTIONS
Check annual labs, will message with results, if stable follow up with PCP Dr. Ambrosio in 6 months for follow up on chronic conditions    Refilled requested labs    Discussed Shingles and Covid vaccines, pt declined    Endoscopy will contact you to schedule your colonoscopy    Bone Density scan ordered

## 2022-05-23 NOTE — PROGRESS NOTES
Subjective:       Patient ID: Maria Alejandra De La Rosa is a 67 y.o. female.    Chief Complaint: Annual Exam    Pt of Dr. Caryn Ambrosio, here for annual.    Has been out of her HCTZ    BS yesterday and this morning was 65. Wants to get off of trulicity    Overdue on labs    Review of Systems   Constitutional: Negative for activity change, appetite change and unexpected weight change.   HENT: Negative for dental problem and hearing loss.    Eyes: Negative for visual disturbance.   Respiratory: Negative for apnea, cough, chest tightness and shortness of breath.    Cardiovascular: Negative for chest pain, palpitations and leg swelling.   Gastrointestinal: Negative for abdominal distention, abdominal pain, anal bleeding, blood in stool, constipation, diarrhea, nausea, rectal pain and vomiting.   Endocrine: Negative for cold intolerance, heat intolerance, polydipsia, polyphagia and polyuria.   Genitourinary: Negative for difficulty urinating, hematuria, menstrual problem, pelvic pain and vaginal pain.   Musculoskeletal: Negative for arthralgias.   Integumentary:  Negative for color change.   Allergic/Immunologic: Negative for environmental allergies, food allergies and immunocompromised state.   Neurological: Negative for dizziness, speech difficulty, weakness, light-headedness, numbness and headaches.   Hematological: Negative for adenopathy. Does not bruise/bleed easily.   Psychiatric/Behavioral: Negative for agitation, behavioral problems, sleep disturbance and suicidal ideas.        Review of patient's allergies indicates:   Allergen Reactions    Penicillin Hives    Penicillins      Other reaction(s): Rash    Vicodin  [hydrocodone-acetaminophen]      Other reaction(s): Unknown    Metformin Diarrhea     Diarrhea        Current Outpatient Medications:     alcohol swabs (BD ALCOHOL SWABS) PadM, Apply 1 each topically as needed (blood sugar testing)., Disp: 100 each, Rfl: 1    atorvastatin (LIPITOR) 10 MG tablet, TAKE 1  TABLET EVERY DAY, Disp: 90 tablet, Rfl: 0    blood sugar diagnostic (ONETOUCH VERIO TEST STRIPS) Strp, Use 4 times a day. DX code  E 11.42, Disp: 200 strip, Rfl: 3    carvediloL (COREG) 12.5 MG tablet, TAKE 1 TABLET TWICE DAILY WITH MEALS, Disp: 180 tablet, Rfl: 1    cyanocobalamin 500 MCG tablet, Take 500 mcg by mouth., Disp: , Rfl:     glimepiride (AMARYL) 4 MG tablet, TAKE 1 TABLET (4 MG TOTAL) BY MOUTH DAILY WITH BREAKFAST., Disp: 90 tablet, Rfl: 0    hydroCHLOROthiazide (HYDRODIURIL) 25 MG tablet, Take 1 tablet (25 mg total) by mouth once daily., Disp: 90 tablet, Rfl: 3    losartan (COZAAR) 100 MG tablet, Take 1 tablet (100 mg total) by mouth once daily. Appointment needed for any future refills, Disp: 90 tablet, Rfl: 0    multivitamin capsule, Take 1 capsule by mouth once daily., Disp: , Rfl:     ONETOUCH DELICA LANCETS 33 gauge Misc, Inject 1 lancet into the skin 2 (two) times daily., Disp: 60 each, Rfl: 12    TRULICITY 1.5 mg/0.5 mL pen injector, INJECT 1.5MG (1 PEN) SUBCUTANEOUSLY EVERY WEEK, Disp: 12 pen, Rfl: 0    vitamin D 1000 units Tab, Take 1,000 Units by mouth once daily., Disp: , Rfl:     Patient Active Problem List   Diagnosis    Hypertension associated with diabetes    Obesity    Diabetes mellitus type 2 in obese    BMI 40.0-44.9, adult    Hyperlipidemia associated with type 2 diabetes mellitus    Calculus of kidney    Gallbladder problem    Hypercholesterolemia    Menopause present    Urinary incontinence     Past Medical History:   Diagnosis Date    Bipolar 1 disorder     Cataract     Diabetes mellitus type 2 in obese 2018    Diabetes mellitus, type 2     DJD (degenerative joint disease)     Dysmetabolic syndrome     Hyperglycemia     Hypertension     Lumbar stenosis     Sleep apnea      Past Surgical History:   Procedure Laterality Date    BACK SURGERY       SECTION      CHOLECYSTECTOMY       Social History     Socioeconomic History    Marital  "status:    Tobacco Use    Smoking status: Never Smoker    Smokeless tobacco: Never Used   Substance and Sexual Activity    Alcohol use: No     Family History   Problem Relation Age of Onset    Cancer Mother         lung cancer    Cataracts Mother     COPD Mother     Cancer Father         lung cancer     Diabetes Maternal Grandmother     No Known Problems Sister     No Known Problems Brother     No Known Problems Maternal Aunt     No Known Problems Maternal Uncle     No Known Problems Paternal Aunt     No Known Problems Paternal Uncle     No Known Problems Maternal Grandfather     No Known Problems Paternal Grandmother     No Known Problems Paternal Grandfather     Amblyopia Neg Hx     Blindness Neg Hx     Glaucoma Neg Hx     Hypertension Neg Hx     Macular degeneration Neg Hx     Retinal detachment Neg Hx     Strabismus Neg Hx     Stroke Neg Hx     Thyroid disease Neg Hx            Objective:       Vitals:    05/23/22 1510 05/23/22 1526   BP: (!) 164/66 (!) 160/70   Pulse: 62    Weight: 113.2 kg (249 lb 9 oz)    Height: 5' 6" (1.676 m)    PainSc: 0-No pain      Body mass index is 40.28 kg/m².    Physical Exam  Vitals and nursing note reviewed.   Constitutional:       Appearance: Normal appearance. She is well-developed. She is obese.   HENT:      Head: Normocephalic.      Right Ear: Hearing, tympanic membrane, ear canal and external ear normal. There is no impacted cerumen.      Left Ear: Hearing, tympanic membrane, ear canal and external ear normal. There is no impacted cerumen.      Nose: Nose normal.      Mouth/Throat:      Mouth: Mucous membranes are moist.      Pharynx: Oropharynx is clear.   Eyes:      General: Lids are normal. Lids are everted, no foreign bodies appreciated.      Extraocular Movements: Extraocular movements intact.      Conjunctiva/sclera: Conjunctivae normal.      Pupils: Pupils are equal, round, and reactive to light.   Neck:      Vascular: No carotid bruit " or JVD.      Trachea: Trachea normal.   Cardiovascular:      Rate and Rhythm: Normal rate and regular rhythm.      Pulses: Normal pulses.      Heart sounds: Normal heart sounds, S1 normal and S2 normal.   Pulmonary:      Effort: Pulmonary effort is normal.      Breath sounds: Normal breath sounds.   Abdominal:      General: Bowel sounds are normal.      Palpations: Abdomen is soft.   Musculoskeletal:         General: Normal range of motion.      Cervical back: Full passive range of motion without pain, normal range of motion and neck supple.   Skin:     General: Skin is warm and dry.      Capillary Refill: Capillary refill takes less than 2 seconds.   Neurological:      General: No focal deficit present.      Mental Status: She is alert and oriented to person, place, and time.      Deep Tendon Reflexes: Reflexes are normal and symmetric.   Psychiatric:         Mood and Affect: Mood normal.         Speech: Speech normal.         Behavior: Behavior normal.         Thought Content: Thought content normal.         Judgment: Judgment normal.         Assessment:       Problem List Items Addressed This Visit        Cardiac/Vascular    Hypertension associated with diabetes    Relevant Medications    hydroCHLOROthiazide (HYDRODIURIL) 25 MG tablet    glimepiride (AMARYL) 4 MG tablet    losartan (COZAAR) 100 MG tablet    carvediloL (COREG) 12.5 MG tablet    dulaglutide (TRULICITY) 1.5 mg/0.5 mL pen injector    Other Relevant Orders    CBC Auto Differential    Comprehensive Metabolic Panel    Hyperlipidemia associated with type 2 diabetes mellitus - Primary    Relevant Medications    glimepiride (AMARYL) 4 MG tablet    atorvastatin (LIPITOR) 10 MG tablet    dulaglutide (TRULICITY) 1.5 mg/0.5 mL pen injector    Other Relevant Orders    Lipid Panel    TSH    Hypercholesterolemia    Relevant Medications    atorvastatin (LIPITOR) 10 MG tablet    Other Relevant Orders    Lipid Panel    TSH       Endocrine    Diabetes mellitus type 2  in obese    Relevant Medications    glimepiride (AMARYL) 4 MG tablet    dulaglutide (TRULICITY) 1.5 mg/0.5 mL pen injector    Other Relevant Orders    Hemoglobin A1C    BMI 40.0-44.9, adult      Other Visit Diagnoses     Colon cancer screening        Relevant Orders    Case Request Endoscopy: COLONOSCOPY (Completed)    Need for shingles vaccine        Postmenopausal estrogen deficiency        Relevant Orders    DXA Bone Density Spine And Hip    Morbidly obese        Personal history of colonic polyps         Relevant Orders    Case Request Endoscopy: COLONOSCOPY (Completed)          Plan:       Maria Alejandra was seen today for annual exam.    Diagnoses and all orders for this visit:    Hyperlipidemia associated with type 2 diabetes mellitus  -     Lipid Panel; Future  -     TSH; Future  -     atorvastatin (LIPITOR) 10 MG tablet; Take 1 tablet (10 mg total) by mouth once daily.    Continue cholesterol med, low fat diet, and exercise. Check lipids.    Hypertension associated with diabetes  -     CBC Auto Differential; Future  -     Comprehensive Metabolic Panel; Future  -     hydroCHLOROthiazide (HYDRODIURIL) 25 MG tablet; Take 1 tablet (25 mg total) by mouth once daily.  -     losartan (COZAAR) 100 MG tablet; Take 1 tablet (100 mg total) by mouth once daily.  -     carvediloL (COREG) 12.5 MG tablet; Take 1 tablet (12.5 mg total) by mouth 2 (two) times daily with meals.    Elevated, has been out of HCTZ    Take medications as prescribed.    Monitor BP at home, goal BP < or = 140/80, call office if consistently above this range.    Follow low salt DASH diet and exercise.    BMI reviewed.    Go to ED if Headaches, blurred vision, chest pain, or SOB occurs along with elevated readings > or = 160/90.    Hypercholesterolemia  -     Lipid Panel; Future  -     TSH; Future  -     atorvastatin (LIPITOR) 10 MG tablet; Take 1 tablet (10 mg total) by mouth once daily.    Continue cholesterol med, low fat diet, and exercise. Check  lipids.    Diabetes mellitus type 2 in obese  -     Hemoglobin A1C; Future  -     glimepiride (AMARYL) 4 MG tablet; Take 1 tablet (4 mg total) by mouth daily with breakfast.  -     dulaglutide (TRULICITY) 1.5 mg/0.5 mL pen injector; INJECT 1.5MG (1 PEN) SUBCUTANEOUSLY EVERY WEEK    A1C goal < 7.0, BP goal < 140/80, LDL goal < 100.    Adhere to ADA diet.    Take meds as prescribed, check BS daily. Notify if sugars are out of range discussed during visit.    Yearly eye exam discussed.    Yearly foot exam discussed.    Colon cancer screening  -     Case Request Endoscopy: COLONOSCOPY    Need for shingles vaccine  -     Cancel: (In Office Administered) Zoster Recombinant Vaccine    Postmenopausal estrogen deficiency  -     DXA Bone Density Spine And Hip; Future    BMI 40.0-44.9, adult  BMI reviewed    Morbidly obese  BMI reviewed.    Diet and exercise to lose weight.    Check annual labs, will message with results, if stable follow up with PCP Dr. Ambrosio in 6 months for follow up on chronic conditions    Refilled requested labs    Discussed Shingles and Covid vaccines, pt declined    Endoscopy will contact you to schedule your colonoscopy    Bone Density scan ordered    Self care instructions provided in AVS    Follow up in about 6 months (around 11/23/2022) for with PCP Dr. Ambrosio in 6 months for follow up on chronic conditions.    Personal history of colonic polyps   -     Case Request Endoscopy: COLONOSCOPY

## 2022-05-24 ENCOUNTER — LAB VISIT (OUTPATIENT)
Dept: LAB | Facility: HOSPITAL | Age: 67
End: 2022-05-24
Attending: INTERNAL MEDICINE
Payer: MEDICARE

## 2022-05-24 DIAGNOSIS — E78.5 HYPERLIPIDEMIA ASSOCIATED WITH TYPE 2 DIABETES MELLITUS: ICD-10-CM

## 2022-05-24 DIAGNOSIS — E78.00 HYPERCHOLESTEROLEMIA: ICD-10-CM

## 2022-05-24 DIAGNOSIS — E66.9 DIABETES MELLITUS TYPE 2 IN OBESE: ICD-10-CM

## 2022-05-24 DIAGNOSIS — I15.2 HYPERTENSION ASSOCIATED WITH DIABETES: ICD-10-CM

## 2022-05-24 DIAGNOSIS — E11.69 HYPERLIPIDEMIA ASSOCIATED WITH TYPE 2 DIABETES MELLITUS: ICD-10-CM

## 2022-05-24 DIAGNOSIS — E11.59 HYPERTENSION ASSOCIATED WITH DIABETES: ICD-10-CM

## 2022-05-24 DIAGNOSIS — E11.69 DIABETES MELLITUS TYPE 2 IN OBESE: ICD-10-CM

## 2022-05-24 LAB
ALBUMIN SERPL BCP-MCNC: 3.5 G/DL (ref 3.5–5.2)
ALP SERPL-CCNC: 85 U/L (ref 55–135)
ALT SERPL W/O P-5'-P-CCNC: 14 U/L (ref 10–44)
ANION GAP SERPL CALC-SCNC: 9 MMOL/L (ref 8–16)
AST SERPL-CCNC: 16 U/L (ref 10–40)
BASOPHILS # BLD AUTO: 0.02 K/UL (ref 0–0.2)
BASOPHILS NFR BLD: 0.4 % (ref 0–1.9)
BILIRUB SERPL-MCNC: 0.6 MG/DL (ref 0.1–1)
BUN SERPL-MCNC: 15 MG/DL (ref 8–23)
CALCIUM SERPL-MCNC: 9.4 MG/DL (ref 8.7–10.5)
CHLORIDE SERPL-SCNC: 109 MMOL/L (ref 95–110)
CHOLEST SERPL-MCNC: 99 MG/DL (ref 120–199)
CHOLEST/HDLC SERPL: 2 {RATIO} (ref 2–5)
CO2 SERPL-SCNC: 26 MMOL/L (ref 23–29)
CREAT SERPL-MCNC: 0.9 MG/DL (ref 0.5–1.4)
DIFFERENTIAL METHOD: ABNORMAL
EOSINOPHIL # BLD AUTO: 0.2 K/UL (ref 0–0.5)
EOSINOPHIL NFR BLD: 3.3 % (ref 0–8)
ERYTHROCYTE [DISTWIDTH] IN BLOOD BY AUTOMATED COUNT: 13.4 % (ref 11.5–14.5)
EST. GFR  (AFRICAN AMERICAN): >60 ML/MIN/1.73 M^2
EST. GFR  (NON AFRICAN AMERICAN): >60 ML/MIN/1.73 M^2
ESTIMATED AVG GLUCOSE: 123 MG/DL (ref 68–131)
GLUCOSE SERPL-MCNC: 81 MG/DL (ref 70–110)
HBA1C MFR BLD: 5.9 % (ref 4–5.6)
HCT VFR BLD AUTO: 37.3 % (ref 37–48.5)
HDLC SERPL-MCNC: 49 MG/DL (ref 40–75)
HDLC SERPL: 49.5 % (ref 20–50)
HGB BLD-MCNC: 11.9 G/DL (ref 12–16)
IMM GRANULOCYTES # BLD AUTO: 0 K/UL (ref 0–0.04)
IMM GRANULOCYTES NFR BLD AUTO: 0 % (ref 0–0.5)
LDLC SERPL CALC-MCNC: 39 MG/DL (ref 63–159)
LYMPHOCYTES # BLD AUTO: 1.3 K/UL (ref 1–4.8)
LYMPHOCYTES NFR BLD: 27.2 % (ref 18–48)
MCH RBC QN AUTO: 29.4 PG (ref 27–31)
MCHC RBC AUTO-ENTMCNC: 31.9 G/DL (ref 32–36)
MCV RBC AUTO: 92 FL (ref 82–98)
MONOCYTES # BLD AUTO: 0.6 K/UL (ref 0.3–1)
MONOCYTES NFR BLD: 12 % (ref 4–15)
NEUTROPHILS # BLD AUTO: 2.8 K/UL (ref 1.8–7.7)
NEUTROPHILS NFR BLD: 57.1 % (ref 38–73)
NONHDLC SERPL-MCNC: 50 MG/DL
NRBC BLD-RTO: 0 /100 WBC
PLATELET # BLD AUTO: 195 K/UL (ref 150–450)
PMV BLD AUTO: 9.9 FL (ref 9.2–12.9)
POTASSIUM SERPL-SCNC: 4.2 MMOL/L (ref 3.5–5.1)
PROT SERPL-MCNC: 6.6 G/DL (ref 6–8.4)
RBC # BLD AUTO: 4.05 M/UL (ref 4–5.4)
SODIUM SERPL-SCNC: 144 MMOL/L (ref 136–145)
TRIGL SERPL-MCNC: 55 MG/DL (ref 30–150)
TSH SERPL DL<=0.005 MIU/L-ACNC: 3.67 UIU/ML (ref 0.4–4)
WBC # BLD AUTO: 4.85 K/UL (ref 3.9–12.7)

## 2022-05-24 PROCEDURE — 84443 ASSAY THYROID STIM HORMONE: CPT | Performed by: NURSE PRACTITIONER

## 2022-05-24 PROCEDURE — 80061 LIPID PANEL: CPT | Performed by: NURSE PRACTITIONER

## 2022-05-24 PROCEDURE — 36415 COLL VENOUS BLD VENIPUNCTURE: CPT | Performed by: NURSE PRACTITIONER

## 2022-05-24 PROCEDURE — 85025 COMPLETE CBC W/AUTO DIFF WBC: CPT | Performed by: NURSE PRACTITIONER

## 2022-05-24 PROCEDURE — 83036 HEMOGLOBIN GLYCOSYLATED A1C: CPT | Performed by: NURSE PRACTITIONER

## 2022-05-24 PROCEDURE — 80053 COMPREHEN METABOLIC PANEL: CPT | Performed by: NURSE PRACTITIONER

## 2022-05-25 ENCOUNTER — HOSPITAL ENCOUNTER (OUTPATIENT)
Dept: RADIOLOGY | Facility: CLINIC | Age: 67
Discharge: HOME | End: 2022-05-25
Attending: NURSE PRACTITIONER
Payer: MEDICARE

## 2022-05-25 DIAGNOSIS — Z78.0 POSTMENOPAUSAL ESTROGEN DEFICIENCY: ICD-10-CM

## 2022-05-25 PROCEDURE — 77080 DXA BONE DENSITY AXIAL: CPT | Mod: 26,,, | Performed by: INTERNAL MEDICINE

## 2022-05-25 PROCEDURE — 77080 DEXA BONE DENSITY SPINE HIP: ICD-10-PCS | Mod: 26,,, | Performed by: INTERNAL MEDICINE

## 2022-05-25 PROCEDURE — 77080 DXA BONE DENSITY AXIAL: CPT | Mod: TC

## 2022-06-08 ENCOUNTER — PATIENT MESSAGE (OUTPATIENT)
Dept: INTERNAL MEDICINE | Facility: CLINIC | Age: 67
End: 2022-06-08
Payer: MEDICARE

## 2022-06-11 ENCOUNTER — PATIENT MESSAGE (OUTPATIENT)
Dept: INTERNAL MEDICINE | Facility: CLINIC | Age: 67
End: 2022-06-11
Payer: MEDICARE

## 2022-06-11 DIAGNOSIS — E66.9 DIABETES MELLITUS TYPE 2 IN OBESE: Primary | ICD-10-CM

## 2022-06-11 DIAGNOSIS — E11.69 DIABETES MELLITUS TYPE 2 IN OBESE: Primary | ICD-10-CM

## 2022-06-22 ENCOUNTER — TELEPHONE (OUTPATIENT)
Dept: ENDOSCOPY | Facility: HOSPITAL | Age: 67
End: 2022-06-22
Payer: MEDICARE

## 2022-07-21 ENCOUNTER — PATIENT OUTREACH (OUTPATIENT)
Dept: ADMINISTRATIVE | Facility: HOSPITAL | Age: 67
End: 2022-07-21
Payer: MEDICARE

## 2022-08-12 ENCOUNTER — PATIENT OUTREACH (OUTPATIENT)
Dept: ADMINISTRATIVE | Facility: HOSPITAL | Age: 67
End: 2022-08-12
Payer: MEDICARE

## 2022-08-12 NOTE — PROGRESS NOTES
There are no preventive care reminders to display for this patient.    Triggered LINKS. Updated Care Everywhere. Imported outside [email or web address] results into [employer]. Chart review completed.

## 2022-10-11 ENCOUNTER — OFFICE VISIT (OUTPATIENT)
Dept: OPTOMETRY | Facility: CLINIC | Age: 67
End: 2022-10-11
Payer: MEDICARE

## 2022-10-11 DIAGNOSIS — H25.13 NUCLEAR SCLEROSIS OF BOTH EYES: ICD-10-CM

## 2022-10-11 DIAGNOSIS — H52.4 HYPEROPIA OF BOTH EYES WITH ASTIGMATISM AND PRESBYOPIA: ICD-10-CM

## 2022-10-11 DIAGNOSIS — H52.03 HYPEROPIA OF BOTH EYES WITH ASTIGMATISM AND PRESBYOPIA: ICD-10-CM

## 2022-10-11 DIAGNOSIS — E11.9 TYPE 2 DIABETES MELLITUS WITHOUT OPHTHALMIC MANIFESTATIONS: ICD-10-CM

## 2022-10-11 DIAGNOSIS — Z01.00 ROUTINE EYE EXAM: Primary | ICD-10-CM

## 2022-10-11 DIAGNOSIS — H52.203 HYPEROPIA OF BOTH EYES WITH ASTIGMATISM AND PRESBYOPIA: ICD-10-CM

## 2022-10-11 PROCEDURE — 99499 UNLISTED E&M SERVICE: CPT | Mod: S$GLB,,, | Performed by: OPTOMETRIST

## 2022-10-11 PROCEDURE — 92004 PR EYE EXAM, NEW PATIENT,COMPREHESV: ICD-10-PCS | Mod: S$GLB,,, | Performed by: OPTOMETRIST

## 2022-10-11 PROCEDURE — 92015 PR REFRACTION: ICD-10-PCS | Mod: S$GLB,,, | Performed by: OPTOMETRIST

## 2022-10-11 PROCEDURE — 92015 DETERMINE REFRACTIVE STATE: CPT | Mod: S$GLB,,, | Performed by: OPTOMETRIST

## 2022-10-11 PROCEDURE — 99999 PR PBB SHADOW E&M-EST. PATIENT-LVL III: CPT | Mod: PBBFAC,,, | Performed by: OPTOMETRIST

## 2022-10-11 PROCEDURE — 92004 COMPRE OPH EXAM NEW PT 1/>: CPT | Mod: S$GLB,,, | Performed by: OPTOMETRIST

## 2022-10-11 PROCEDURE — 99999 PR PBB SHADOW E&M-EST. PATIENT-LVL III: ICD-10-PCS | Mod: PBBFAC,,, | Performed by: OPTOMETRIST

## 2022-10-11 NOTE — PROGRESS NOTES
HPI    Last eye exam was 5/28/19 with Dr. Puente.  Patient states no vision changes since last exam. Didn't update glasses rx   after last exam.  Patient denies diplopia, headaches, flashes/floaters, and pain.    Hemoglobin A1C       Date                     Value               Ref Range             Status                05/24/2022               5.9 (H)             4.0 - 5.6 %           Final                Last edited by hSeyla Miller MA on 10/11/2022  9:25 AM.            Assessment /Plan     For exam results, see Encounter Report.    Routine eye exam    Hyperopia of both eyes with astigmatism and presbyopia    Type 2 diabetes mellitus without ophthalmic manifestations    Nuclear sclerosis of both eyes            1-2.  Bifocal rx given.   Retina flat and intact OU--no holes, tears, breaks, or RDs.    3.  No retinopathy--monitor yearly.  BS control.  Eye health normal OU.  4.  Educated on cataracts and affects on vision.  Monitor.

## 2022-10-20 DIAGNOSIS — E11.9 TYPE 2 DIABETES MELLITUS WITHOUT COMPLICATION: ICD-10-CM

## 2022-10-24 ENCOUNTER — PATIENT MESSAGE (OUTPATIENT)
Dept: ADMINISTRATIVE | Facility: HOSPITAL | Age: 67
End: 2022-10-24
Payer: MEDICARE

## 2022-12-14 DIAGNOSIS — E11.9 TYPE 2 DIABETES MELLITUS WITHOUT COMPLICATION: ICD-10-CM

## 2022-12-19 ENCOUNTER — PATIENT MESSAGE (OUTPATIENT)
Dept: ADMINISTRATIVE | Facility: HOSPITAL | Age: 67
End: 2022-12-19
Payer: MEDICARE

## 2023-01-06 ENCOUNTER — HOSPITAL ENCOUNTER (OUTPATIENT)
Facility: HOSPITAL | Age: 68
Discharge: HOME | End: 2023-01-08
Attending: EMERGENCY MEDICINE | Admitting: HOSPITALIST
Payer: MEDICARE

## 2023-01-06 DIAGNOSIS — E66.9 DIABETES MELLITUS TYPE 2 IN OBESE: Chronic | ICD-10-CM

## 2023-01-06 DIAGNOSIS — J11.1 INFLUENZA: ICD-10-CM

## 2023-01-06 DIAGNOSIS — R05.9 COUGH: ICD-10-CM

## 2023-01-06 DIAGNOSIS — N17.9 AKI (ACUTE KIDNEY INJURY): Primary | ICD-10-CM

## 2023-01-06 DIAGNOSIS — R73.9 HYPERGLYCEMIA: ICD-10-CM

## 2023-01-06 DIAGNOSIS — E87.6 HYPOKALEMIA: ICD-10-CM

## 2023-01-06 DIAGNOSIS — R68.89 MULTIPLE COMPLAINTS: ICD-10-CM

## 2023-01-06 DIAGNOSIS — R07.9 CHEST PAIN: ICD-10-CM

## 2023-01-06 DIAGNOSIS — E11.69 DIABETES MELLITUS TYPE 2 IN OBESE: Chronic | ICD-10-CM

## 2023-01-06 PROBLEM — J10.1 INFLUENZA A: Status: ACTIVE | Noted: 2023-01-06

## 2023-01-06 PROBLEM — J18.9 PNEUMONIA DUE TO INFECTIOUS ORGANISM: Status: ACTIVE | Noted: 2023-01-06

## 2023-01-06 LAB
ALBUMIN SERPL BCP-MCNC: 3 G/DL (ref 3.5–5.2)
ALLENS TEST: ABNORMAL
ALP SERPL-CCNC: 129 U/L (ref 55–135)
ALT SERPL W/O P-5'-P-CCNC: 19 U/L (ref 10–44)
ANION GAP SERPL CALC-SCNC: 13 MMOL/L (ref 8–16)
AST SERPL-CCNC: 19 U/L (ref 10–40)
B-OH-BUTYR BLD STRIP-SCNC: 0.2 MMOL/L (ref 0–0.5)
BACTERIA #/AREA URNS AUTO: NORMAL /HPF
BASOPHILS # BLD AUTO: 0.02 K/UL (ref 0–0.2)
BASOPHILS NFR BLD: 0.2 % (ref 0–1.9)
BILIRUB SERPL-MCNC: 1.6 MG/DL (ref 0.1–1)
BILIRUB UR QL STRIP: NEGATIVE
BUN SERPL-MCNC: 20 MG/DL (ref 8–23)
CALCIUM SERPL-MCNC: 9.7 MG/DL (ref 8.7–10.5)
CHLORIDE SERPL-SCNC: 96 MMOL/L (ref 95–110)
CLARITY UR REFRACT.AUTO: CLEAR
CO2 SERPL-SCNC: 26 MMOL/L (ref 23–29)
COLOR UR AUTO: YELLOW
CREAT SERPL-MCNC: 1.5 MG/DL (ref 0.5–1.4)
DELSYS: ABNORMAL
DIFFERENTIAL METHOD: ABNORMAL
EOSINOPHIL # BLD AUTO: 0 K/UL (ref 0–0.5)
EOSINOPHIL NFR BLD: 0.2 % (ref 0–8)
ERYTHROCYTE [DISTWIDTH] IN BLOOD BY AUTOMATED COUNT: 13.2 % (ref 11.5–14.5)
EST. GFR  (NO RACE VARIABLE): 37.5 ML/MIN/1.73 M^2
GLUCOSE SERPL-MCNC: 444 MG/DL (ref 70–110)
GLUCOSE UR QL STRIP: ABNORMAL
HCO3 UR-SCNC: 33.6 MMOL/L (ref 24–28)
HCT VFR BLD AUTO: 41.2 % (ref 37–48.5)
HCV AB SERPL QL IA: NORMAL
HGB BLD-MCNC: 13.8 G/DL (ref 12–16)
HGB UR QL STRIP: NEGATIVE
HIV 1+2 AB+HIV1 P24 AG SERPL QL IA: NORMAL
IMM GRANULOCYTES # BLD AUTO: 0.06 K/UL (ref 0–0.04)
IMM GRANULOCYTES NFR BLD AUTO: 0.5 % (ref 0–0.5)
INFLUENZA A, MOLECULAR: DETECTED
INFLUENZA B, MOLECULAR: NOT DETECTED
KETONES UR QL STRIP: NEGATIVE
LEUKOCYTE ESTERASE UR QL STRIP: NEGATIVE
LYMPHOCYTES # BLD AUTO: 1.2 K/UL (ref 1–4.8)
LYMPHOCYTES NFR BLD: 9.2 % (ref 18–48)
MCH RBC QN AUTO: 29.6 PG (ref 27–31)
MCHC RBC AUTO-ENTMCNC: 33.5 G/DL (ref 32–36)
MCV RBC AUTO: 88 FL (ref 82–98)
MICROSCOPIC COMMENT: NORMAL
MONOCYTES # BLD AUTO: 1.4 K/UL (ref 0.3–1)
MONOCYTES NFR BLD: 10.9 % (ref 4–15)
NEUTROPHILS # BLD AUTO: 10.3 K/UL (ref 1.8–7.7)
NEUTROPHILS NFR BLD: 79 % (ref 38–73)
NITRITE UR QL STRIP: NEGATIVE
NRBC BLD-RTO: 0 /100 WBC
OSMOLALITY SERPL: 319 MOSM/KG (ref 275–295)
PCO2 BLDA: 43.4 MMHG (ref 35–45)
PH SMN: 7.5 [PH] (ref 7.35–7.45)
PH UR STRIP: 5 [PH] (ref 5–8)
PLATELET # BLD AUTO: 292 K/UL (ref 150–450)
PMV BLD AUTO: 9.7 FL (ref 9.2–12.9)
PO2 BLDA: 30 MMHG (ref 40–60)
POC BE: 10 MMOL/L
POC SATURATED O2: 64 % (ref 95–100)
POC TCO2: 35 MMOL/L (ref 24–29)
POCT GLUCOSE: 367 MG/DL (ref 70–110)
POCT GLUCOSE: 389 MG/DL (ref 70–110)
POTASSIUM SERPL-SCNC: 3.1 MMOL/L (ref 3.5–5.1)
PROT SERPL-MCNC: 6.9 G/DL (ref 6–8.4)
PROT UR QL STRIP: NEGATIVE
RBC # BLD AUTO: 4.66 M/UL (ref 4–5.4)
RBC #/AREA URNS AUTO: 1 /HPF (ref 0–4)
RSV AG BY MOLECULAR METHOD: NOT DETECTED
SAMPLE: ABNORMAL
SARS-COV-2 RNA RESP QL NAA+PROBE: NOT DETECTED
SITE: ABNORMAL
SODIUM SERPL-SCNC: 135 MMOL/L (ref 136–145)
SP GR UR STRIP: 1.01 (ref 1–1.03)
SQUAMOUS #/AREA URNS AUTO: 2 /HPF
URN SPEC COLLECT METH UR: ABNORMAL
WBC # BLD AUTO: 12.98 K/UL (ref 3.9–12.7)
WBC #/AREA URNS AUTO: 2 /HPF (ref 0–5)
YEAST UR QL AUTO: NORMAL

## 2023-01-06 PROCEDURE — 86803 HEPATITIS C AB TEST: CPT | Performed by: PHYSICIAN ASSISTANT

## 2023-01-06 PROCEDURE — 96375 TX/PRO/DX INJ NEW DRUG ADDON: CPT

## 2023-01-06 PROCEDURE — 99284 EMERGENCY DEPT VISIT MOD MDM: CPT | Mod: CS,,, | Performed by: PHYSICIAN ASSISTANT

## 2023-01-06 PROCEDURE — 99284 PR EMERGENCY DEPT VISIT,LEVEL IV: ICD-10-PCS | Mod: CS,,, | Performed by: PHYSICIAN ASSISTANT

## 2023-01-06 PROCEDURE — G0378 HOSPITAL OBSERVATION PER HR: HCPCS

## 2023-01-06 PROCEDURE — 96372 THER/PROPH/DIAG INJ SC/IM: CPT | Mod: 59 | Performed by: HOSPITALIST

## 2023-01-06 PROCEDURE — 99285 EMERGENCY DEPT VISIT HI MDM: CPT | Mod: 25

## 2023-01-06 PROCEDURE — 99223 1ST HOSP IP/OBS HIGH 75: CPT | Mod: ,,, | Performed by: HOSPITALIST

## 2023-01-06 PROCEDURE — 82010 KETONE BODYS QUAN: CPT | Performed by: PHYSICIAN ASSISTANT

## 2023-01-06 PROCEDURE — 25000003 PHARM REV CODE 250: Performed by: HOSPITALIST

## 2023-01-06 PROCEDURE — 25000003 PHARM REV CODE 250: Performed by: PHYSICIAN ASSISTANT

## 2023-01-06 PROCEDURE — 96365 THER/PROPH/DIAG IV INF INIT: CPT

## 2023-01-06 PROCEDURE — 85025 COMPLETE CBC W/AUTO DIFF WBC: CPT | Performed by: PHYSICIAN ASSISTANT

## 2023-01-06 PROCEDURE — 83930 ASSAY OF BLOOD OSMOLALITY: CPT | Performed by: PHYSICIAN ASSISTANT

## 2023-01-06 PROCEDURE — 80053 COMPREHEN METABOLIC PANEL: CPT | Performed by: PHYSICIAN ASSISTANT

## 2023-01-06 PROCEDURE — 93010 ELECTROCARDIOGRAM REPORT: CPT | Mod: ,,, | Performed by: INTERNAL MEDICINE

## 2023-01-06 PROCEDURE — 63600175 PHARM REV CODE 636 W HCPCS: Performed by: HOSPITALIST

## 2023-01-06 PROCEDURE — 93005 ELECTROCARDIOGRAM TRACING: CPT

## 2023-01-06 PROCEDURE — 81001 URINALYSIS AUTO W/SCOPE: CPT | Performed by: PHYSICIAN ASSISTANT

## 2023-01-06 PROCEDURE — 93010 EKG 12-LEAD: ICD-10-PCS | Mod: ,,, | Performed by: INTERNAL MEDICINE

## 2023-01-06 PROCEDURE — 87389 HIV-1 AG W/HIV-1&-2 AB AG IA: CPT | Performed by: PHYSICIAN ASSISTANT

## 2023-01-06 PROCEDURE — 99900035 HC TECH TIME PER 15 MIN (STAT)

## 2023-01-06 PROCEDURE — 63700000 PHARM REV CODE 250 ALT 637 W/O HCPCS: Performed by: PHYSICIAN ASSISTANT

## 2023-01-06 PROCEDURE — 82962 GLUCOSE BLOOD TEST: CPT

## 2023-01-06 PROCEDURE — 82803 BLOOD GASES ANY COMBINATION: CPT

## 2023-01-06 PROCEDURE — 63600175 PHARM REV CODE 636 W HCPCS: Performed by: PHYSICIAN ASSISTANT

## 2023-01-06 PROCEDURE — 99223 PR INITIAL HOSPITAL CARE,LEVL III: ICD-10-PCS | Mod: ,,, | Performed by: HOSPITALIST

## 2023-01-06 PROCEDURE — 0241U SARS-COV2 (COVID) WITH FLU/RSV BY PCR: CPT | Performed by: PHYSICIAN ASSISTANT

## 2023-01-06 RX ORDER — OSELTAMIVIR PHOSPHATE 75 MG/1
75 CAPSULE ORAL
Status: COMPLETED | OUTPATIENT
Start: 2023-01-06 | End: 2023-01-06

## 2023-01-06 RX ORDER — IBUPROFEN 200 MG
16 TABLET ORAL
Status: DISCONTINUED | OUTPATIENT
Start: 2023-01-06 | End: 2023-01-08 | Stop reason: HOSPADM

## 2023-01-06 RX ORDER — ENOXAPARIN SODIUM 100 MG/ML
40 INJECTION SUBCUTANEOUS EVERY 12 HOURS
Status: DISCONTINUED | OUTPATIENT
Start: 2023-01-06 | End: 2023-01-08 | Stop reason: HOSPADM

## 2023-01-06 RX ORDER — NALOXONE HCL 0.4 MG/ML
0.02 VIAL (ML) INJECTION
Status: DISCONTINUED | OUTPATIENT
Start: 2023-01-06 | End: 2023-01-08 | Stop reason: HOSPADM

## 2023-01-06 RX ORDER — DROPERIDOL 2.5 MG/ML
1.25 INJECTION, SOLUTION INTRAMUSCULAR; INTRAVENOUS
Status: COMPLETED | OUTPATIENT
Start: 2023-01-06 | End: 2023-01-06

## 2023-01-06 RX ORDER — AZITHROMYCIN 250 MG/1
500 TABLET, FILM COATED ORAL DAILY
Status: DISCONTINUED | OUTPATIENT
Start: 2023-01-07 | End: 2023-01-07

## 2023-01-06 RX ORDER — INSULIN ASPART 100 [IU]/ML
0-5 INJECTION, SOLUTION INTRAVENOUS; SUBCUTANEOUS
Status: DISCONTINUED | OUTPATIENT
Start: 2023-01-06 | End: 2023-01-08 | Stop reason: HOSPADM

## 2023-01-06 RX ORDER — SODIUM CHLORIDE 0.9 % (FLUSH) 0.9 %
10 SYRINGE (ML) INJECTION EVERY 6 HOURS PRN
Status: DISCONTINUED | OUTPATIENT
Start: 2023-01-06 | End: 2023-01-08 | Stop reason: HOSPADM

## 2023-01-06 RX ORDER — CYANOCOBALAMIN (VITAMIN B-12) 250 MCG
500 TABLET ORAL DAILY
Status: DISCONTINUED | OUTPATIENT
Start: 2023-01-07 | End: 2023-01-08 | Stop reason: HOSPADM

## 2023-01-06 RX ORDER — ONDANSETRON 2 MG/ML
4 INJECTION INTRAMUSCULAR; INTRAVENOUS
Status: COMPLETED | OUTPATIENT
Start: 2023-01-06 | End: 2023-01-06

## 2023-01-06 RX ORDER — GLUCAGON 1 MG
1 KIT INJECTION
Status: DISCONTINUED | OUTPATIENT
Start: 2023-01-06 | End: 2023-01-08 | Stop reason: HOSPADM

## 2023-01-06 RX ORDER — ATORVASTATIN CALCIUM 10 MG/1
10 TABLET, FILM COATED ORAL DAILY
Status: DISCONTINUED | OUTPATIENT
Start: 2023-01-07 | End: 2023-01-08 | Stop reason: HOSPADM

## 2023-01-06 RX ORDER — CHOLECALCIFEROL (VITAMIN D3) 25 MCG
1000 TABLET ORAL DAILY
Status: DISCONTINUED | OUTPATIENT
Start: 2023-01-07 | End: 2023-01-08 | Stop reason: HOSPADM

## 2023-01-06 RX ORDER — ACETAMINOPHEN 325 MG/1
650 TABLET ORAL EVERY 4 HOURS PRN
Status: DISCONTINUED | OUTPATIENT
Start: 2023-01-06 | End: 2023-01-08 | Stop reason: HOSPADM

## 2023-01-06 RX ORDER — ONDANSETRON 2 MG/ML
4 INJECTION INTRAMUSCULAR; INTRAVENOUS EVERY 8 HOURS PRN
Status: DISCONTINUED | OUTPATIENT
Start: 2023-01-06 | End: 2023-01-08 | Stop reason: HOSPADM

## 2023-01-06 RX ORDER — GUAIFENESIN/DEXTROMETHORPHAN 100-10MG/5
10 SYRUP ORAL
Status: COMPLETED | OUTPATIENT
Start: 2023-01-06 | End: 2023-01-06

## 2023-01-06 RX ORDER — POLYETHYLENE GLYCOL 3350 17 G/17G
17 POWDER, FOR SOLUTION ORAL 2 TIMES DAILY PRN
Status: DISCONTINUED | OUTPATIENT
Start: 2023-01-06 | End: 2023-01-08 | Stop reason: HOSPADM

## 2023-01-06 RX ORDER — AZITHROMYCIN 250 MG/1
500 TABLET, FILM COATED ORAL
Status: COMPLETED | OUTPATIENT
Start: 2023-01-06 | End: 2023-01-06

## 2023-01-06 RX ORDER — IBUPROFEN 200 MG
24 TABLET ORAL
Status: DISCONTINUED | OUTPATIENT
Start: 2023-01-06 | End: 2023-01-08 | Stop reason: HOSPADM

## 2023-01-06 RX ORDER — BENZONATATE 100 MG/1
100 CAPSULE ORAL 3 TIMES DAILY PRN
Status: DISCONTINUED | OUTPATIENT
Start: 2023-01-06 | End: 2023-01-08 | Stop reason: HOSPADM

## 2023-01-06 RX ORDER — OSELTAMIVIR PHOSPHATE 30 MG/1
30 CAPSULE ORAL 2 TIMES DAILY
Status: DISCONTINUED | OUTPATIENT
Start: 2023-01-07 | End: 2023-01-07

## 2023-01-06 RX ORDER — PROCHLORPERAZINE EDISYLATE 5 MG/ML
5 INJECTION INTRAMUSCULAR; INTRAVENOUS EVERY 6 HOURS PRN
Status: DISCONTINUED | OUTPATIENT
Start: 2023-01-06 | End: 2023-01-08 | Stop reason: HOSPADM

## 2023-01-06 RX ORDER — TALC
6 POWDER (GRAM) TOPICAL NIGHTLY PRN
Status: DISCONTINUED | OUTPATIENT
Start: 2023-01-06 | End: 2023-01-08 | Stop reason: HOSPADM

## 2023-01-06 RX ADMIN — GUAIFENESIN AND DEXTROMETHORPHAN HYDROBROMIDE 1 TABLET: 600; 30 TABLET, EXTENDED RELEASE ORAL at 09:01

## 2023-01-06 RX ADMIN — AZITHROMYCIN MONOHYDRATE 500 MG: 250 TABLET ORAL at 03:01

## 2023-01-06 RX ADMIN — GUAIFENESIN AND DEXTROMETHORPHAN 10 ML: 100; 10 SYRUP ORAL at 04:01

## 2023-01-06 RX ADMIN — SODIUM CHLORIDE, POTASSIUM CHLORIDE, SODIUM LACTATE AND CALCIUM CHLORIDE 1000 ML: 600; 310; 30; 20 INJECTION, SOLUTION INTRAVENOUS at 03:01

## 2023-01-06 RX ADMIN — POTASSIUM BICARBONATE 40 MEQ: 391 TABLET, EFFERVESCENT ORAL at 04:01

## 2023-01-06 RX ADMIN — INSULIN DETEMIR 12 UNITS: 100 INJECTION, SOLUTION SUBCUTANEOUS at 09:01

## 2023-01-06 RX ADMIN — CEFTRIAXONE 1 G: 1 INJECTION, POWDER, FOR SOLUTION INTRAMUSCULAR; INTRAVENOUS at 03:01

## 2023-01-06 RX ADMIN — OSELTAMIVIR 75 MG: 75 CAPSULE ORAL at 06:01

## 2023-01-06 RX ADMIN — ONDANSETRON 4 MG: 2 INJECTION INTRAMUSCULAR; INTRAVENOUS at 04:01

## 2023-01-06 RX ADMIN — DROPERIDOL 1.25 MG: 2.5 INJECTION, SOLUTION INTRAMUSCULAR; INTRAVENOUS at 06:01

## 2023-01-06 RX ADMIN — ENOXAPARIN SODIUM 40 MG: 40 INJECTION SUBCUTANEOUS at 09:01

## 2023-01-06 RX ADMIN — INSULIN ASPART 2 UNITS: 100 INJECTION, SOLUTION INTRAVENOUS; SUBCUTANEOUS at 09:01

## 2023-01-06 NOTE — PROVIDER PROGRESS NOTES - EMERGENCY DEPT.
Encounter Date: 1/6/2023    ED Physician Progress Notes         EKG - STEMI Decision  Initial Reading: No STEMI present.  Response: No Action Needed.

## 2023-01-06 NOTE — ED PROVIDER NOTES
Encounter Date: 2023       History     Chief Complaint   Patient presents with    Hyperglycemia     432 gl, flu like symptoms     67-year-old female with history of DM T2, bipolar 1, hypertension, who was sent to the ED from urgent care due to concerns for DKA and right lower lobe pneumonia.  Patient reports subjective fever and chills, productive cough for the past 10 days.  States he also initially had some diarrhea which resolved.  Presents to [facility] [place] [facility] urgent care and was shown to have a right lower lobe pneumonia on chest x-ray and was hyperglycemic with a blood sugar of 438.  Was advised to come to the ED. patient is compliant with her diabetes medications.    The history is provided by the patient.   Review of patient's allergies indicates:   Allergen Reactions    Penicillin Hives    Penicillins      Other reaction(s): Rash    Vicodin  [hydrocodone-acetaminophen]      Other reaction(s): Unknown    Metformin Diarrhea     Diarrhea      Past Medical History:   Diagnosis Date    Bipolar 1 disorder     Cataract     Diabetes mellitus type 2 in obese 2018    Diabetes mellitus, type 2     DJD (degenerative joint disease)     Dysmetabolic syndrome     Hyperglycemia     Hypertension     Lumbar stenosis     Sleep apnea      Past Surgical History:   Procedure Laterality Date    BACK SURGERY       SECTION      CHOLECYSTECTOMY       Family History   Problem Relation Age of Onset    Cancer Mother         lung cancer    Cataracts Mother     COPD Mother     Cancer Father         lung cancer     Diabetes Maternal Grandmother     No Known Problems Sister     No Known Problems Brother     No Known Problems Maternal Aunt     No Known Problems Maternal Uncle     No Known Problems Paternal Aunt     No Known Problems Paternal Uncle     No Known Problems Maternal Grandfather     No Known Problems Paternal Grandmother     No Known Problems Paternal Grandfather     Amblyopia Neg Hx     Blindness Neg Hx     Glaucoma Neg Hx      Hypertension Neg Hx     Macular degeneration Neg Hx     Retinal detachment Neg Hx     Strabismus Neg Hx     Stroke Neg Hx     Thyroid disease Neg Hx      Social History     Tobacco Use    Smoking status: Never    Smokeless tobacco: Never   Substance Use Topics    Alcohol use: No     Review of Systems   Constitutional:  Positive for chills and fever.   HENT:  Negative for sore throat.    Respiratory:  Positive for cough.    Cardiovascular:  Negative for chest pain.   Gastrointestinal:  Positive for abdominal pain and diarrhea. Negative for nausea and vomiting.   Genitourinary:  Negative for difficulty urinating and dysuria.   Musculoskeletal: Negative.    Skin: Negative.    Neurological:  Negative for weakness.   Psychiatric/Behavioral:  Negative for confusion.      Physical Exam     Initial Vitals [01/06/23 1453]   BP Pulse Resp Temp SpO2   121/66 79 (!) 28 98.7 °F (37.1 °C) 95 %      MAP       --         Physical Exam    Nursing note and vitals reviewed.  Constitutional: She appears well-developed and well-nourished. She is not diaphoretic. No distress.   HENT:   Head: Normocephalic and atraumatic.   Eyes: Conjunctivae are normal. Pupils are equal, round, and reactive to light.   Neck: Neck supple.   Normal range of motion.  Cardiovascular:  Normal rate, regular rhythm, normal heart sounds and intact distal pulses.           Pulmonary/Chest: She has rhonchi (Coarse rhonchi Bilateral lower lobes).   Abdominal: Abdomen is soft. Bowel sounds are normal. There is no abdominal tenderness.   Musculoskeletal:         General: Normal range of motion.      Cervical back: Normal range of motion and neck supple.     Neurological: She is alert and oriented to person, place, and time. GCS score is 15. GCS eye subscore is 4. GCS verbal subscore is 5. GCS motor subscore is 6.   Skin: Skin is warm and dry. Capillary refill takes less than 2 seconds.   Psychiatric: She has a normal mood and affect.       ED Course    Procedures  Labs Reviewed   SARS-COV2 (COVID) WITH FLU/RSV BY PCR - Abnormal; Notable for the following components:       Result Value    Influenza A, Molecular Detected (*)     All other components within normal limits   CBC W/ AUTO DIFFERENTIAL - Abnormal; Notable for the following components:    WBC 12.98 (*)     Gran # (ANC) 10.3 (*)     Immature Grans (Abs) 0.06 (*)     Mono # 1.4 (*)     Gran % 79.0 (*)     Lymph % 9.2 (*)     All other components within normal limits    Narrative:     Release to patient->Immediate   COMPREHENSIVE METABOLIC PANEL - Abnormal; Notable for the following components:    Sodium 135 (*)     Potassium 3.1 (*)     Glucose 444 (*)     Creatinine 1.5 (*)     Albumin 3.0 (*)     Total Bilirubin 1.6 (*)     eGFR 37.5 (*)     All other components within normal limits    Narrative:     Release to patient->Immediate   POCT GLUCOSE - Abnormal; Notable for the following components:    POCT Glucose 367 (*)     All other components within normal limits   ISTAT PROCEDURE - Abnormal; Notable for the following components:    POC PH 7.497 (*)     POC PO2 30 (*)     POC HCO3 33.6 (*)     POC SATURATED O2 64 (*)     POC TCO2 35 (*)     All other components within normal limits   HIV 1 / 2 ANTIBODY    Narrative:     Release to patient->Immediate   HEPATITIS C ANTIBODY    Narrative:     Release to patient->Immediate   BETA - HYDROXYBUTYRATE, SERUM    Narrative:     Release to patient->Immediate   URINALYSIS, REFLEX TO URINE CULTURE   URINALYSIS MICROSCOPIC   POCT GLUCOSE MONITORING CONTINUOUS        ECG Results              EKG 12-lead (Final result)  Result time 01/06/23 16:25:40      Final result by Interface, Lab In [facility] (01/06/23 16:25:40)                   Narrative:    Test Reason : R73.9,    Vent. Rate : 070 BPM     Atrial Rate : 070 BPM     P-R Int : 138 ms          QRS Dur : 086 ms      QT Int : 416 ms       P-R-T Axes : -06 026 054 degrees     QTc Int : 449 ms    Normal sinus  rhythm  Nonspecific ST and T wave abnormality  Abnormal ECG  When compared with ECG of 18-AUG-2019 19:12,  No significant change was found  Confirmed by Genia Rapp MD ([id number]) on 1/6/2023 4:25:34 PM    Referred By: [email or web address]   SELF           Confirmed By:Genia Rapp MD                                  Imaging Results              X-Ray Chest 1 View (Final result)  Result time 01/06/23 17:30:40      Final result by Jed Field MD (01/06/23 17:30:40)                   Impression:      1. No acute cardiopulmonary process allowing for shallow inspiratory effort.      Electronically signed by: Jed Field MD  Date:    01/06/2023  Time:    17:30               Narrative:    EXAMINATION:  XR CHEST 1 VIEW    CLINICAL HISTORY:  Cough, unspecified    TECHNIQUE:  Single frontal view of the chest was performed.    COMPARISON:  01/06/2023    FINDINGS:  The cardiomediastinal silhouette is not enlarged.  There is calcification of the aorta..  There is no pleural effusion.  The trachea is midline.  The lungs are symmetrically expanded bilaterally with mildly coarse interstitial attenuation accentuated by habitus and shallow inspiratory effort..  No large focal consolidation seen.  There is no pneumothorax.  The osseous structures are remarkable for degenerative changes..                                       X-Ray Chest AP Portable (Final result)  Result time 01/06/23 16:26:34      Final result by Jed Field MD (01/06/23 16:26:34)                   Impression:      1. Interstitial findings are likely the result of shallow inspiratory effort and habitus, no large focal consolidation.      Electronically signed by: Jed Field MD  Date:    01/06/2023  Time:    16:26               Narrative:    EXAMINATION:  XR CHEST AP PORTABLE    CLINICAL HISTORY:  hyperglycemia;    TECHNIQUE:  Single frontal view of the chest was performed.    COMPARISON:  08/18/2019    FINDINGS:  The cardiomediastinal silhouette is  prominent, magnified by technique noting calcification of the aorta..  There is no pleural effusion.  The trachea is midline.  The lungs are symmetrically expanded bilaterally with coarse interstitial attenuation accentuated by shallow inspiratory effort and habitus..  No large focal consolidation seen.  There is no pneumothorax.  The osseous structures are remarkable for degenerative change..                                       Medications   droperidoL injection 1.25 mg (has no administration in time range)   lactated ringers bolus 1,000 mL (0 mLs Intravenous Stopped 1/6/23 1652)   cefTRIAXone (ROCEPHIN) 1 g in dextrose 5 % in water (D5W) 5 % 50 mL IVPB (MB+) (0 g Intravenous Stopped 1/6/23 1652)   azithromycin tablet 500 mg (500 mg Oral Given 1/6/23 1555)   potassium bicarbonate disintegrating tablet 40 mEq (40 mEq Oral Given 1/6/23 1651)   ondansetron injection 4 mg (4 mg Intravenous Given 1/6/23 1652)   dextromethorphan-guaiFENesin  mg/5 ml liquid 10 mL (10 mLs Oral Given 1/6/23 1651)   oseltamivir capsule 75 mg (75 mg Oral Given 1/6/23 1800)     Medical Decision Making:   History:   Old Medical Records: I decided to obtain old medical records.  Initial Assessment:   67-year-old female presents the ED for productive cough, fevers/chills and hyperglycemia.  Rate was seen at urgent care and was sent to the ED due to concern for pneumonia and DKA.  Differential Diagnosis:   DKA, dehydration, ISAI, COVID-19, influenza, pneumonia  Clinical Tests:   Lab Tests: Ordered and Reviewed  Radiological Study: Ordered and Reviewed  ED Management:  Patient arrived to the ED with a diagnosis of right lower lobe pneumonia and was started on Rocephin and azithromycin.  Her viral screen is positive for influenza A.  Will give her dose of Tamiflu here in the ED.  Glucose elevated 444 however she is not acidotic, normal anion gap and negative for BPH be doubt DKA.  Will give her IV fluids to help with her hyperglycemia.   Slightly low potassium which was repleted orally in the ED.  Noted to have a mild ISAI with a creatinine of 1.5.  Her ambulatory saturation is 92% given her hypoxia with ambulation, ISAI will admit to hospital management for further treatment of her multiple conditions.           ED Course as of 01/06/23 1814 Fri Jan 06, 2023   1529 Beta-Hydroxybutyrate: 0.2 [HJ]   1545 WBC(!): 12.98 [HJ]   1608 Potassium(!): 3.1  Mild hypokalemia will replete orally [HJ]   1608 Creatinine(!): 1.5  ISAI [HJ]   1609 Anion Gap: 13  No anion gap [HJ]      ED Course User Index  [HJ] Margie Summers PA-C                 Clinical Impression:   Final diagnoses:  [R68.89] Multiple complaints  [R73.9] Hyperglycemia  [N17.9] ISAI (acute kidney injury) (Primary)  [E87.6] Hypokalemia  [R05.9] Cough  [J11.1] Influenza        ED Disposition Condition    Observation Stable                Margie Summers PA-C  01/06/23 1814

## 2023-01-07 PROBLEM — E87.6 HYPOKALEMIA: Status: ACTIVE | Noted: 2023-01-07

## 2023-01-07 PROBLEM — R10.9 ABDOMINAL CRAMPING: Status: ACTIVE | Noted: 2023-01-07

## 2023-01-07 PROBLEM — E83.42 HYPOMAGNESEMIA: Status: ACTIVE | Noted: 2023-01-07

## 2023-01-07 PROBLEM — R19.7 DIARRHEA: Status: ACTIVE | Noted: 2023-01-07

## 2023-01-07 LAB
ANION GAP SERPL CALC-SCNC: 8 MMOL/L (ref 8–16)
BASOPHILS # BLD AUTO: 0.02 K/UL (ref 0–0.2)
BASOPHILS NFR BLD: 0.2 % (ref 0–1.9)
BUN SERPL-MCNC: 14 MG/DL (ref 8–23)
CALCIUM SERPL-MCNC: 9.3 MG/DL (ref 8.7–10.5)
CHLORIDE SERPL-SCNC: 98 MMOL/L (ref 95–110)
CO2 SERPL-SCNC: 30 MMOL/L (ref 23–29)
CREAT SERPL-MCNC: 1.1 MG/DL (ref 0.5–1.4)
DIFFERENTIAL METHOD: ABNORMAL
EOSINOPHIL # BLD AUTO: 0.1 K/UL (ref 0–0.5)
EOSINOPHIL NFR BLD: 1 % (ref 0–8)
ERYTHROCYTE [DISTWIDTH] IN BLOOD BY AUTOMATED COUNT: 12.8 % (ref 11.5–14.5)
EST. GFR  (NO RACE VARIABLE): 54.4 ML/MIN/1.73 M^2
ESTIMATED AVG GLUCOSE: 326 MG/DL (ref 68–131)
GLUCOSE SERPL-MCNC: 241 MG/DL (ref 70–110)
HBA1C MFR BLD: 13 % (ref 4–5.6)
HCT VFR BLD AUTO: 38 % (ref 37–48.5)
HGB BLD-MCNC: 12.7 G/DL (ref 12–16)
IMM GRANULOCYTES # BLD AUTO: 0.04 K/UL (ref 0–0.04)
IMM GRANULOCYTES NFR BLD AUTO: 0.5 % (ref 0–0.5)
LYMPHOCYTES # BLD AUTO: 1.7 K/UL (ref 1–4.8)
LYMPHOCYTES NFR BLD: 19.1 % (ref 18–48)
MAGNESIUM SERPL-MCNC: 1.5 MG/DL (ref 1.6–2.6)
MCH RBC QN AUTO: 29.4 PG (ref 27–31)
MCHC RBC AUTO-ENTMCNC: 33.4 G/DL (ref 32–36)
MCV RBC AUTO: 88 FL (ref 82–98)
MONOCYTES # BLD AUTO: 1.1 K/UL (ref 0.3–1)
MONOCYTES NFR BLD: 12.3 % (ref 4–15)
NEUTROPHILS # BLD AUTO: 5.8 K/UL (ref 1.8–7.7)
NEUTROPHILS NFR BLD: 66.9 % (ref 38–73)
NRBC BLD-RTO: 0 /100 WBC
PHOSPHATE SERPL-MCNC: 2.6 MG/DL (ref 2.7–4.5)
PLATELET # BLD AUTO: 292 K/UL (ref 150–450)
PMV BLD AUTO: 9.5 FL (ref 9.2–12.9)
POCT GLUCOSE: 234 MG/DL (ref 70–110)
POCT GLUCOSE: 305 MG/DL (ref 70–110)
POCT GLUCOSE: 319 MG/DL (ref 70–110)
POCT GLUCOSE: 326 MG/DL (ref 70–110)
POTASSIUM SERPL-SCNC: 2.9 MMOL/L (ref 3.5–5.1)
PROCALCITONIN SERPL IA-MCNC: 0.08 NG/ML
RBC # BLD AUTO: 4.32 M/UL (ref 4–5.4)
SODIUM SERPL-SCNC: 136 MMOL/L (ref 136–145)
WBC # BLD AUTO: 8.73 K/UL (ref 3.9–12.7)

## 2023-01-07 PROCEDURE — 82653 EL-1 FECAL QUANTITATIVE: CPT

## 2023-01-07 PROCEDURE — 87209 SMEAR COMPLEX STAIN: CPT

## 2023-01-07 PROCEDURE — 87045 FECES CULTURE AEROBIC BACT: CPT

## 2023-01-07 PROCEDURE — 96367 TX/PROPH/DG ADDL SEQ IV INF: CPT

## 2023-01-07 PROCEDURE — 87338 HPYLORI STOOL AG IA: CPT

## 2023-01-07 PROCEDURE — 85025 COMPLETE CBC W/AUTO DIFF WBC: CPT | Performed by: HOSPITALIST

## 2023-01-07 PROCEDURE — 36415 COLL VENOUS BLD VENIPUNCTURE: CPT

## 2023-01-07 PROCEDURE — G0378 HOSPITAL OBSERVATION PER HR: HCPCS

## 2023-01-07 PROCEDURE — 63600175 PHARM REV CODE 636 W HCPCS: Performed by: HOSPITALIST

## 2023-01-07 PROCEDURE — 84100 ASSAY OF PHOSPHORUS: CPT | Performed by: HOSPITALIST

## 2023-01-07 PROCEDURE — 99233 SBSQ HOSP IP/OBS HIGH 50: CPT | Mod: ,,,

## 2023-01-07 PROCEDURE — 87046 STOOL CULTR AEROBIC BACT EA: CPT | Mod: 59

## 2023-01-07 PROCEDURE — 82705 FATS/LIPIDS FECES QUAL: CPT

## 2023-01-07 PROCEDURE — 25000003 PHARM REV CODE 250

## 2023-01-07 PROCEDURE — 87324 CLOSTRIDIUM AG IA: CPT

## 2023-01-07 PROCEDURE — 96375 TX/PRO/DX INJ NEW DRUG ADDON: CPT

## 2023-01-07 PROCEDURE — 96376 TX/PRO/DX INJ SAME DRUG ADON: CPT

## 2023-01-07 PROCEDURE — 80048 BASIC METABOLIC PNL TOTAL CA: CPT | Performed by: HOSPITALIST

## 2023-01-07 PROCEDURE — 87425 ROTAVIRUS AG IA: CPT

## 2023-01-07 PROCEDURE — 83993 ASSAY FOR CALPROTECTIN FECAL: CPT

## 2023-01-07 PROCEDURE — 99233 PR SUBSEQUENT HOSPITAL CARE,LEVL III: ICD-10-PCS | Mod: ,,,

## 2023-01-07 PROCEDURE — 82272 OCCULT BLD FECES 1-3 TESTS: CPT

## 2023-01-07 PROCEDURE — 96366 THER/PROPH/DIAG IV INF ADDON: CPT

## 2023-01-07 PROCEDURE — 87427 SHIGA-LIKE TOXIN AG IA: CPT

## 2023-01-07 PROCEDURE — 63700000 PHARM REV CODE 250 ALT 637 W/O HCPCS: Performed by: HOSPITALIST

## 2023-01-07 PROCEDURE — 87329 GIARDIA AG IA: CPT

## 2023-01-07 PROCEDURE — 83036 HEMOGLOBIN GLYCOSYLATED A1C: CPT | Performed by: HOSPITALIST

## 2023-01-07 PROCEDURE — 63600175 PHARM REV CODE 636 W HCPCS

## 2023-01-07 PROCEDURE — 25000003 PHARM REV CODE 250: Performed by: HOSPITALIST

## 2023-01-07 PROCEDURE — 27000646 HC AEROBIKA DEVICE

## 2023-01-07 PROCEDURE — 84145 PROCALCITONIN (PCT): CPT

## 2023-01-07 PROCEDURE — 89055 LEUKOCYTE ASSESSMENT FECAL: CPT

## 2023-01-07 PROCEDURE — 36415 COLL VENOUS BLD VENIPUNCTURE: CPT | Performed by: HOSPITALIST

## 2023-01-07 PROCEDURE — 83735 ASSAY OF MAGNESIUM: CPT | Performed by: HOSPITALIST

## 2023-01-07 RX ORDER — OSELTAMIVIR PHOSPHATE 75 MG/1
75 CAPSULE ORAL 2 TIMES DAILY
Status: DISCONTINUED | OUTPATIENT
Start: 2023-01-07 | End: 2023-01-08 | Stop reason: HOSPADM

## 2023-01-07 RX ORDER — MAGNESIUM SULFATE HEPTAHYDRATE 40 MG/ML
2 INJECTION, SOLUTION INTRAVENOUS ONCE
Status: DISCONTINUED | OUTPATIENT
Start: 2023-01-07 | End: 2023-01-07

## 2023-01-07 RX ORDER — MAGNESIUM SULFATE HEPTAHYDRATE 40 MG/ML
2 INJECTION, SOLUTION INTRAVENOUS ONCE
Status: COMPLETED | OUTPATIENT
Start: 2023-01-07 | End: 2023-01-07

## 2023-01-07 RX ORDER — CARVEDILOL 12.5 MG/1
12.5 TABLET ORAL 2 TIMES DAILY WITH MEALS
Status: DISCONTINUED | OUTPATIENT
Start: 2023-01-07 | End: 2023-01-08 | Stop reason: HOSPADM

## 2023-01-07 RX ORDER — DICYCLOMINE HYDROCHLORIDE 10 MG/1
10 CAPSULE ORAL 4 TIMES DAILY
Status: DISCONTINUED | OUTPATIENT
Start: 2023-01-07 | End: 2023-01-08 | Stop reason: HOSPADM

## 2023-01-07 RX ORDER — POTASSIUM CHLORIDE 20 MEQ/1
40 TABLET, EXTENDED RELEASE ORAL
Status: COMPLETED | OUTPATIENT
Start: 2023-01-07 | End: 2023-01-07

## 2023-01-07 RX ORDER — POTASSIUM CHLORIDE 7.45 MG/ML
10 INJECTION INTRAVENOUS
Status: COMPLETED | OUTPATIENT
Start: 2023-01-07 | End: 2023-01-07

## 2023-01-07 RX ADMIN — INSULIN ASPART 4 UNITS: 100 INJECTION, SOLUTION INTRAVENOUS; SUBCUTANEOUS at 04:01

## 2023-01-07 RX ADMIN — GUAIFENESIN AND DEXTROMETHORPHAN HYDROBROMIDE 1 TABLET: 600; 30 TABLET, EXTENDED RELEASE ORAL at 08:01

## 2023-01-07 RX ADMIN — DICYCLOMINE HYDROCHLORIDE 10 MG: 10 CAPSULE ORAL at 01:01

## 2023-01-07 RX ADMIN — INSULIN ASPART 2 UNITS: 100 INJECTION, SOLUTION INTRAVENOUS; SUBCUTANEOUS at 08:01

## 2023-01-07 RX ADMIN — ATORVASTATIN CALCIUM 10 MG: 10 TABLET, FILM COATED ORAL at 08:01

## 2023-01-07 RX ADMIN — DICYCLOMINE HYDROCHLORIDE 10 MG: 10 CAPSULE ORAL at 08:01

## 2023-01-07 RX ADMIN — CHOLECALCIFEROL TAB 25 MCG (1000 UNIT) 1000 UNITS: 25 TAB at 08:01

## 2023-01-07 RX ADMIN — POTASSIUM CHLORIDE 10 MEQ: 7.46 INJECTION, SOLUTION INTRAVENOUS at 01:01

## 2023-01-07 RX ADMIN — THERA TABS 1 TABLET: TAB at 08:01

## 2023-01-07 RX ADMIN — MAGNESIUM SULFATE HEPTAHYDRATE 2 G: 40 INJECTION, SOLUTION INTRAVENOUS at 06:01

## 2023-01-07 RX ADMIN — AZITHROMYCIN MONOHYDRATE 500 MG: 250 TABLET ORAL at 08:01

## 2023-01-07 RX ADMIN — BENZONATATE 100 MG: 100 CAPSULE ORAL at 11:01

## 2023-01-07 RX ADMIN — POTASSIUM CHLORIDE 40 MEQ: 1500 TABLET, EXTENDED RELEASE ORAL at 04:01

## 2023-01-07 RX ADMIN — INSULIN ASPART 4 UNITS: 100 INJECTION, SOLUTION INTRAVENOUS; SUBCUTANEOUS at 01:01

## 2023-01-07 RX ADMIN — POTASSIUM CHLORIDE 40 MEQ: 1500 TABLET, EXTENDED RELEASE ORAL at 01:01

## 2023-01-07 RX ADMIN — ONDANSETRON 4 MG: 2 INJECTION INTRAMUSCULAR; INTRAVENOUS at 01:01

## 2023-01-07 RX ADMIN — CEFTRIAXONE 2 G: 2 INJECTION, POWDER, FOR SOLUTION INTRAMUSCULAR; INTRAVENOUS at 05:01

## 2023-01-07 RX ADMIN — OSELTAMIVIR PHOSPHATE 75 MG: 75 CAPSULE ORAL at 08:01

## 2023-01-07 RX ADMIN — POTASSIUM CHLORIDE 10 MEQ: 7.46 INJECTION, SOLUTION INTRAVENOUS at 03:01

## 2023-01-07 RX ADMIN — DICYCLOMINE HYDROCHLORIDE 10 MG: 10 CAPSULE ORAL at 04:01

## 2023-01-07 RX ADMIN — POTASSIUM CHLORIDE 10 MEQ: 7.46 INJECTION, SOLUTION INTRAVENOUS at 04:01

## 2023-01-07 RX ADMIN — OSELTAMIVIR PHOSPHATE 30 MG: 30 CAPSULE ORAL at 08:01

## 2023-01-07 RX ADMIN — CYANOCOBALAMIN TAB 250 MCG 500 MCG: 250 TAB at 08:01

## 2023-01-07 RX ADMIN — CARVEDILOL 12.5 MG: 12.5 TABLET, FILM COATED ORAL at 06:01

## 2023-01-07 NOTE — ASSESSMENT & PLAN NOTE
Patient with acute kidney injury likely due to IVVD/dehydration and pre-renal azotemia ISAI is currently worsening. Labs reviewed- Renal function/electrolytes with Estimated Creatinine Clearance: 45.1 mL/min (A) (based on SCr of 1.5 mg/dL (H)). according to latest data. Monitor urine output and serial BMP and adjust therapy as needed. Avoid nephrotoxins and renally dose meds for GFR listed above.   -  -s/p 1L of IV fluids in ED, trend chemistry panel   -suspect Influenza and potential polyuria from uncontrolled hyperglycemia contributing to ISAI.

## 2023-01-07 NOTE — SUBJECTIVE & OBJECTIVE
Past Medical History:   Diagnosis Date    Bipolar 1 disorder     Cataract     Diabetes mellitus type 2 in obese 2018    Diabetes mellitus, type 2     DJD (degenerative joint disease)     Dysmetabolic syndrome     Hyperglycemia     Hypertension     Lumbar stenosis     Sleep apnea        Past Surgical History:   Procedure Laterality Date    BACK SURGERY       SECTION      CHOLECYSTECTOMY         Review of patient's allergies indicates:   Allergen Reactions    Penicillin Hives    Penicillins      Other reaction(s): Rash    Vicodin  [hydrocodone-acetaminophen]      Other reaction(s): Unknown    Metformin Diarrhea     Diarrhea        No current facility-administered medications on file prior to encounter.     Current Outpatient Medications on File Prior to Encounter   Medication Sig    alcohol swabs (BD ALCOHOL SWABS) PadM Apply 1 each topically as needed (blood sugar testing).    atorvastatin (LIPITOR) 10 MG tablet Take 1 tablet (10 mg total) by mouth once daily.    blood sugar diagnostic (ONETOUCH VERIO TEST STRIPS) Strp Use 4 times a day. DX code  E 11.42    carvediloL (COREG) 12.5 MG tablet Take 1 tablet (12.5 mg total) by mouth 2 (two) times daily with meals.    cyanocobalamin 500 MCG tablet Take 500 mcg by mouth.    glimepiride (AMARYL) 4 MG tablet Take 1 tablet (4 mg total) by mouth daily with breakfast.    hydroCHLOROthiazide (HYDRODIURIL) 25 MG tablet Take 1 tablet (25 mg total) by mouth once daily.    losartan (COZAAR) 100 MG tablet Take 1 tablet (100 mg total) by mouth once daily.    multivitamin capsule Take 1 capsule by mouth once daily.    ONETOUCH DELICA LANCETS 33 gauge Misc Inject 1 lancet into the skin 2 (two) times daily.    SITagliptin (JANUVIA) 50 MG Tab Take 1 tablet (50 mg total) by mouth once daily.    vitamin D 1000 units Tab Take 1,000 Units by mouth once daily.     Family History       Problem Relation (Age of Onset)    COPD Mother    Cancer Mother, Father    Cataracts Mother     Diabetes Maternal Grandmother    No Known Problems Sister, Brother, Maternal Aunt, Maternal Uncle, Paternal Aunt, Paternal Uncle, Maternal Grandfather, Paternal Grandmother, Paternal Grandfather          Tobacco Use    Smoking status: Never    Smokeless tobacco: Never   Substance and Sexual Activity    Alcohol use: No    Drug use: Not on file    Sexual activity: Not on file     Review of Systems   Constitutional:  Positive for activity change, appetite change and fatigue. Negative for chills and fever.   HENT:  Positive for congestion and sore throat. Negative for trouble swallowing.    Eyes:  Negative for visual disturbance.   Respiratory:  Positive for cough. Negative for wheezing.    Cardiovascular:  Negative for chest pain, palpitations and leg swelling.   Gastrointestinal:  Positive for nausea. Negative for abdominal distention, abdominal pain and diarrhea.   Endocrine: Positive for polydipsia and polyuria.   Genitourinary:  Negative for dysuria.   Skin:  Negative for rash.   Psychiatric/Behavioral:  Negative for confusion.    Objective:     Vital Signs (Most Recent):  Temp: 98.7 °F (37.1 °C) (01/06/23 1453)  Pulse: 65 (01/06/23 1902)  Resp: 13 (01/06/23 1658)  BP: (!) 142/67 (01/06/23 1902)  SpO2: 95 % (01/06/23 1859)   Vital Signs (24h Range):  Temp:  [98.7 °F (37.1 °C)] 98.7 °F (37.1 °C)  Pulse:  [65-79] 65  Resp:  [13-28] 13  SpO2:  [92 %-95 %] 95 %  BP: (121-183)/(66-81) 142/67     Weight: 112.9 kg (249 lb)  Body mass index is 40.19 kg/m².    Physical Exam  Vitals and nursing note reviewed.   Constitutional:       General: She is not in acute distress.     Appearance: She is obese. She is ill-appearing.   HENT:      Head: Normocephalic and atraumatic.      Mouth/Throat:      Pharynx: Posterior oropharyngeal erythema present. No pharyngeal swelling or oropharyngeal exudate.   Cardiovascular:      Rate and Rhythm: Normal rate and regular rhythm.      Pulses: Normal pulses.   Pulmonary:      Effort: Pulmonary  effort is normal.      Comments: No accessory muscle use, O2 sat decreases to 92% speaking through right lung fields some inspiratory crackles and rhonchi noted left lower lobe inspiratory crackles no wheezes noted  Abdominal:      General: Bowel sounds are normal. There is no distension.      Palpations: Abdomen is soft.      Tenderness: There is no abdominal tenderness.   Musculoskeletal:      Right lower leg: No edema.      Left lower leg: No edema.   Skin:     General: Skin is warm and dry.   Neurological:      General: No focal deficit present.      Mental Status: She is alert and oriented to person, place, and time.   Psychiatric:         Mood and Affect: Mood normal.         Behavior: Behavior normal.           Significant Labs: All pertinent labs within the past 24 hours have been reviewed.  ABGs:   Recent Labs   Lab 01/06/23  1545   PH 7.497*   PCO2 43.4   HCO3 33.6*   POCSATURATED 64*   BE 10   PO2 30*     CBC:   Recent Labs   Lab 01/06/23  1527   WBC 12.98*   HGB 13.8   HCT 41.2        CMP:   Recent Labs   Lab 01/06/23  1527   *   K 3.1*   CL 96   CO2 26   *   BUN 20   CREATININE 1.5*   CALCIUM 9.7   PROT 6.9   ALBUMIN 3.0*   BILITOT 1.6*   ALKPHOS 129   AST 19   ALT 19   ANIONGAP 13     Cardiac Markers: No results for input(s): CKMB, MYOGLOBIN, BNP, TROPISTAT in the last 48 hours.  Coagulation: No results for input(s): PT, INR, APTT in the last 48 hours.  Lactic Acid: No results for input(s): LACTATE in the last 48 hours.  Magnesium: No results for input(s): MG in the last 48 hours.  Troponin: No results for input(s): TROPONINI, TROPONINIHS in the last 48 hours.  Urine Studies:   Recent Labs   Lab 01/06/23  1700   COLORU Yellow   APPEARANCEUA Clear   PHUR 5.0   SPECGRAV 1.015   PROTEINUA Negative   GLUCUA 4+*   KETONESU Negative   BILIRUBINUA Negative   OCCULTUA Negative   NITRITE Negative   LEUKOCYTESUR Negative   RBCUA 1   WBCUA 2   BACTERIA Rare   SQUAMEPITHEL 2       Significant  Imaging: I have reviewed all pertinent imaging results/findings within the past 24 hours.  XR CHEST 1 VIEW     CLINICAL HISTORY:  Cough, unspecified     TECHNIQUE:  Single frontal view of the chest was performed.     COMPARISON:  01/06/2023     FINDINGS:  The cardiomediastinal silhouette is not enlarged.  There is calcification of the aorta..  There is no pleural effusion.  The trachea is midline.  The lungs are symmetrically expanded bilaterally with mildly coarse interstitial attenuation accentuated by habitus and shallow inspiratory effort..  No large focal consolidation seen.  There is no pneumothorax.  The osseous structures are remarkable for degenerative changes..     Impression:     1. No acute cardiopulmonary process allowing for shallow inspiratory effort.        Electronically signed by: Jed Field MD  Date:                                            01/06/2023  Time:                                           17:30

## 2023-01-07 NOTE — ASSESSMENT & PLAN NOTE
Patient with noted electrolyte deficiencies with Hypo-Magnesemia. Latest electrolytes have been reviewed and values are   Magnesium   Date Value Ref Range Status   01/07/2023 1.5 (L) 1.6 - 2.6 mg/dL Final   . Will continue to monitor electrolytes closely. Will replace the affected electrolytes and repeat labs to be done after interventions completed.

## 2023-01-07 NOTE — ASSESSMENT & PLAN NOTE
-patient with 7 days plus of respiratory tract symptoms, positive for influenza, report outpatient CXR with concern for RLL pneumonia, repeat cxr in our system w/o defined consolidation.   -Patient with leukocytosis, tachypneia, no hypoxic respiratory failure, will be admitted for observation.  Given duration since symptom onset and worsening symptoms, she has received empiric antibiotics for CAP coverage (continued Ceftriaxone/Azithromycin for 2 days)  -Continue Renal dosed Oseltamivir 30mg po bid x 8 doses --> will adjust to 75 mg for improved CrCl of 61 today  -Provide symptom treatment PRN - anti-tussives, acetaminophen.   -Procal negative, unlikely to benefit from prolonged course of abx, will discontinue

## 2023-01-07 NOTE — HPI
67-year-old woman with a history of hypertension, type 2 diabetes, morbid obesity who presents to the emergency department at the [facility] to urgent care for concerns of uncontrolled hyperglycemia and possible right lower lobe pneumonia.  She was seen at [facility] urgent care earlier today she has been reporting over the last 9-10 days symptoms sore throat URI type with cough productive of sputum, fatigue poor appetite and intermittent nausea.  She has a known sick contact her son tested positive for influenza about 9 days ago.    Her glucose earlier today in clinic was greater than 400 in the last week she has not been monitoring her point of care glucoses, she does not take insulin.    ED workup is notable for mild leukocytosis hypokalemia, ISAI is present, hyperglycemic workup is negative for serum ketones and patient has a non acidotic pH, serum Oz and are pending.    She does feel better since receiving antitussives and anti nausea medicine in the emergency department she tested positive for influenza a and has received a dose of oseltamivir.  She will be admitted for observation for concerns of pneumonia.    Review of systems she denies any chest pain palpitations lower extremity swelling, no sleep disturbances, she does have reduced appetite, polyuria, polydipsia.    Meds reviewed & reconciled at bedside    ED treatment: Ceftriaxone, Azithromycin, Zofran, Dextromethorphan Guaif, Oseltamivir, 1L of LR

## 2023-01-07 NOTE — ASSESSMENT & PLAN NOTE
As per pneumonia   -continue oseltamivir for 4 more days, renal dosed  -patient with known sick contact, son tested positive for flu

## 2023-01-07 NOTE — HOSPITAL COURSE
Maria Alejandra De La Rosa was placed in hospital medicine observation for severe symptoms of influenza. She remained afebrile and hemodynamically stable on RA. She was treated with ceftriaxone, azithromycin, and oseltamivir. She developed profuse, large volume, watery diarrhea and abdominal cramping. C diff and stool studies negative. She also had uncontrolled blood sugars, not consistent with DKA and A1c 13.0. Placed ambulatory referral  to endocrinology. Electrolytes required replacement. Flu symptoms greatly improved. Discharged home with 30 mg BID oselvatavir (considering her Crcl <60 on day of discharge) to competed 5 full days of treatment. Follow up with PCP. All questions were answered. Patient acknowledged understanding of discharge instructions and feels safe to discharge home. Patient was discharged on 1/8/2023 in stable condition.

## 2023-01-07 NOTE — ASSESSMENT & PLAN NOTE
Patient's FSGs are uncontrolled due to hyperglycemia on current medication regimen.  Last A1c reviewed-   Lab Results   Component Value Date    HGBA1C 5.9 (H) 05/24/2022     Most recent fingerstick glucose reviewed-   Recent Labs   Lab 01/06/23  1459 01/06/23  1750   POCTGLUCOSE 367* 389*     Current correctional scale  Low  Maintain anti-hyperglycemic dose as follows-   Antihyperglycemics (From admission, onward)    Start     Stop Route Frequency Ordered    01/06/23 2100  insulin detemir U-100 pen 12 Units         -- SubQ Nightly 01/06/23 1849    01/06/23 1943  insulin aspart U-100 pen 0-5 Units         -- SubQ Before meals & nightly PRN 01/06/23 1849        Hold Oral hypoglycemics while patient is in the hospital.

## 2023-01-07 NOTE — SUBJECTIVE & OBJECTIVE
Interval History: Pt seen at bedside with  present. NAEON. HDS, afebrile. Labs reviewed. Pt was sitting up in bed appearing uncomfortable. Reports multiple episodes of large volume watery diarrhea this morning and diffuse abdominal cramping. Has persistent cough and feels overall unwell. Will get stool studies, order meds for her cramping, and replace electrolytes.     Review of Systems   Constitutional:  Positive for activity change, appetite change and fatigue. Negative for chills and fever.   HENT:  Positive for congestion and sore throat.    Eyes:  Negative for visual disturbance.   Respiratory:  Positive for cough. Negative for chest tightness, shortness of breath and wheezing.    Cardiovascular:  Negative for chest pain, palpitations and leg swelling.   Gastrointestinal:  Positive for abdominal pain and diarrhea. Negative for abdominal distention and nausea.   Endocrine: Positive for polydipsia and polyuria.   Genitourinary:  Negative for dysuria.   Skin:  Negative for rash.   Neurological:  Negative for dizziness and weakness.   Psychiatric/Behavioral:  Negative for confusion.    Objective:     Vital Signs (Most Recent):  Temp: 97.4 °F (36.3 °C) (01/07/23 1641)  Pulse: 71 (01/07/23 1641)  Resp: 17 (01/07/23 1641)  BP: (!) 170/80 (01/07/23 1641)  SpO2: (!) 93 % (01/07/23 1641)   Vital Signs (24h Range):  Temp:  [97.4 °F (36.3 °C)-99.5 °F (37.5 °C)] 97.4 °F (36.3 °C)  Pulse:  [63-76] 71  Resp:  [13-20] 17  SpO2:  [89 %-96 %] 93 %  BP: (135-183)/(62-81) 170/80     Weight: 112.9 kg (249 lb)  Body mass index is 40.19 kg/m².  No intake or output data in the 24 hours ending 01/07/23 1650   Physical Exam  Vitals and nursing note reviewed.   Constitutional:       Appearance: She is obese. She is ill-appearing.   HENT:      Head: Normocephalic and atraumatic.      Nose: Nose normal.      Mouth/Throat:      Pharynx: No pharyngeal swelling.   Cardiovascular:      Rate and Rhythm: Normal rate and regular rhythm.    Pulmonary:      Effort: Pulmonary effort is normal.      Breath sounds: Rhonchi and rales present.   Abdominal:      General: Bowel sounds are normal. There is no distension.      Palpations: Abdomen is soft.      Tenderness: There is no abdominal tenderness. There is no guarding or rebound.   Musculoskeletal:      Right lower leg: No edema.      Left lower leg: No edema.   Skin:     General: Skin is warm and dry.   Neurological:      General: No focal deficit present.      Mental Status: She is alert and oriented to person, place, and time.   Psychiatric:         Mood and Affect: Mood normal.         Behavior: Behavior normal.       Significant Labs: All pertinent labs within the past 24 hours have been reviewed.  A1C:   Recent Labs   Lab 01/07/23 0917   HGBA1C 13.0*       CBC:   Recent Labs   Lab 01/06/23 1527 01/07/23 0917   WBC 12.98* 8.73   HGB 13.8 12.7   HCT 41.2 38.0    292     CMP:   Recent Labs   Lab 01/06/23 1527 01/07/23 0917   * 136   K 3.1* 2.9*   CL 96 98   CO2 26 30*   * 241*   BUN 20 14   CREATININE 1.5* 1.1   CALCIUM 9.7 9.3   PROT 6.9  --    ALBUMIN 3.0*  --    BILITOT 1.6*  --    ALKPHOS 129  --    AST 19  --    ALT 19  --    ANIONGAP 13 8     Magnesium:   Recent Labs   Lab 01/07/23 0917   MG 1.5*       Significant Imaging: I have reviewed all pertinent imaging results/findings within the past 24 hours.  X-Ray Chest 1 View  Narrative: EXAMINATION:  XR CHEST 1 VIEW    CLINICAL HISTORY:  Cough, unspecified    TECHNIQUE:  Single frontal view of the chest was performed.    COMPARISON:  01/06/2023    FINDINGS:  The cardiomediastinal silhouette is not enlarged.  There is calcification of the aorta..  There is no pleural effusion.  The trachea is midline.  The lungs are symmetrically expanded bilaterally with mildly coarse interstitial attenuation accentuated by habitus and shallow inspiratory effort..  No large focal consolidation seen.  There is no pneumothorax.  The  osseous structures are remarkable for degenerative changes..  Impression: 1. No acute cardiopulmonary process allowing for shallow inspiratory effort.    Electronically signed by: Jed Field MD  Date:    01/06/2023  Time:    17:30  X-Ray Chest AP Portable  Narrative: EXAMINATION:  XR CHEST AP PORTABLE    CLINICAL HISTORY:  hyperglycemia;    TECHNIQUE:  Single frontal view of the chest was performed.    COMPARISON:  08/18/2019    FINDINGS:  The cardiomediastinal silhouette is prominent, magnified by technique noting calcification of the aorta..  There is no pleural effusion.  The trachea is midline.  The lungs are symmetrically expanded bilaterally with coarse interstitial attenuation accentuated by shallow inspiratory effort and habitus..  No large focal consolidation seen.  There is no pneumothorax.  The osseous structures are remarkable for degenerative change..  Impression: 1. Interstitial findings are likely the result of shallow inspiratory effort and habitus, no large focal consolidation.    Electronically signed by: Jed Field MD  Date:    01/06/2023  Time:    16:26

## 2023-01-07 NOTE — ASSESSMENT & PLAN NOTE
Resolved   Patient with acute kidney injury likely due to IVVD/dehydration and pre-renal azotemia ISAI is currently improving. Labs reviewed- Renal function/electrolytes with Estimated Creatinine Clearance: 61.5 mL/min (based on SCr of 1.1 mg/dL). according to latest data. Monitor urine output and serial BMP and adjust therapy as needed. Avoid nephrotoxins and renally dose meds for GFR listed above.   -s/p 1L of IV fluids in ED, trend chemistry panel   -suspect Influenza and potential polyuria from uncontrolled hyperglycemia contributing to SIAI.

## 2023-01-07 NOTE — ASSESSMENT & PLAN NOTE
Abdominal cramping  -stool studies ordered  -bentyl for cramping  -consider loperamide if stool studies negative

## 2023-01-07 NOTE — PROGRESS NOTES
[facility] [address] [place] [address] Medicine  Progress Note    Patient Name: Maria Alejandra De La Rosa  MRN: [id number]  Patient Class: OP- Observation   Admission Date: 1/6/2023  Length of Stay: 0 days  Attending Physician: Lexi Evans MD  Primary Care Provider: Caryn Ambrosio MD        Subjective:     Principal Problem:Pneumonia due to infectious organism        HPI:  67-year-old woman with a history of hypertension, type 2 diabetes, morbid obesity who presents to the emergency department at the [facility] urgent care for concerns of uncontrolled hyperglycemia and possible right lower lobe pneumonia.  She was seen at [facility] urgent care earlier today she has been reporting over the last 9-10 days symptoms sore throat URI type with cough productive of sputum, fatigue poor appetite and intermittent nausea.  She has a known sick contact her son tested positive for influenza about 9 days ago.    Her glucose earlier today in clinic was greater than 400 in the last week she has not been monitoring her point of care glucoses, she does not take insulin.    ED workup is notable for mild leukocytosis hypokalemia, ISAI is present, hyperglycemic workup is negative for serum ketones and patient has a non acidotic pH, serum Oz and are pending.    She does feel better since receiving antitussives and anti nausea medicine in the emergency department she tested positive for influenza a and has received a dose of oseltamivir.  She will be admitted for observation for concerns of pneumonia.    Review of systems she denies any chest pain palpitations lower extremity swelling, no sleep disturbances, she does have reduced appetite, polyuria, polydipsia.    Meds reviewed & reconciled at bedside    ED treatment: Ceftriaxone, Azithromycin, Zofran, Dextromethorphan Guaif, Oseltamivir, 1L of LR      Overview/Hospital Course:  Maria Alejandra De La Rosa placed in hospital medicine observation for severe symptoms of influenza. She has remained afebrile and  hemodynamically stable on RA. She was treated with ceftriaxone, azithromycin, and oseltamivir. She developed profuse, large volume, watery diarrhea and abdominal cramping. Brad studies ordered. She also had uncontrolled blood sugars, not consistent with DKA. A1c 13.0. Will refer to endocrinology OP. Electrolytes requiring replacement frequently.       Interval History: Pt seen at bedside with  present. CLARKEEON. HDS, afebrile. Labs reviewed. Pt was sitting up in bed appearing uncomfortable. Reports multiple episodes of large volume watery diarrhea this morning and diffuse abdominal cramping. Has persistent cough and feels overall unwell. Will get stool studies, order meds for her cramping, and replace electrolytes.     Review of Systems   Constitutional:  Positive for activity change, appetite change and fatigue. Negative for chills and fever.   HENT:  Positive for congestion and sore throat.    Eyes:  Negative for visual disturbance.   Respiratory:  Positive for cough. Negative for chest tightness, shortness of breath and wheezing.    Cardiovascular:  Negative for chest pain, palpitations and leg swelling.   Gastrointestinal:  Positive for abdominal pain and diarrhea. Negative for abdominal distention and nausea.   Endocrine: Positive for polydipsia and polyuria.   Genitourinary:  Negative for dysuria.   Skin:  Negative for rash.   Neurological:  Negative for dizziness and weakness.   Psychiatric/Behavioral:  Negative for confusion.    Objective:     Vital Signs (Most Recent):  Temp: 97.4 °F (36.3 °C) (01/07/23 1641)  Pulse: 71 (01/07/23 1641)  Resp: 17 (01/07/23 1641)  BP: (!) 170/80 (01/07/23 1641)  SpO2: (!) 93 % (01/07/23 1641)   Vital Signs (24h Range):  Temp:  [97.4 °F (36.3 °C)-99.5 °F (37.5 °C)] 97.4 °F (36.3 °C)  Pulse:  [63-76] 71  Resp:  [13-20] 17  SpO2:  [89 %-96 %] 93 %  BP: (135-183)/(62-81) 170/80     Weight: 112.9 kg (249 lb)  Body mass index is 40.19 kg/m².  No intake or output data in the 24  hours ending 01/07/23 1650   Physical Exam  Vitals and nursing note reviewed.   Constitutional:       Appearance: She is obese. She is ill-appearing.   HENT:      Head: Normocephalic and atraumatic.      Nose: Nose normal.      Mouth/Throat:      Pharynx: No pharyngeal swelling.   Cardiovascular:      Rate and Rhythm: Normal rate and regular rhythm.   Pulmonary:      Effort: Pulmonary effort is normal.      Breath sounds: Rhonchi and rales present.   Abdominal:      General: Bowel sounds are normal. There is no distension.      Palpations: Abdomen is soft.      Tenderness: There is no abdominal tenderness. There is no guarding or rebound.   Musculoskeletal:      Right lower leg: No edema.      Left lower leg: No edema.   Skin:     General: Skin is warm and dry.   Neurological:      General: No focal deficit present.      Mental Status: She is alert and oriented to person, place, and time.   Psychiatric:         Mood and Affect: Mood normal.         Behavior: Behavior normal.       Significant Labs: All pertinent labs within the past 24 hours have been reviewed.  A1C:   Recent Labs   Lab 01/07/23  0917   HGBA1C 13.0*       CBC:   Recent Labs   Lab 01/06/23  1527 01/07/23  0917   WBC 12.98* 8.73   HGB 13.8 12.7   HCT 41.2 38.0    292     CMP:   Recent Labs   Lab 01/06/23  1527 01/07/23  0917   * 136   K 3.1* 2.9*   CL 96 98   CO2 26 30*   * 241*   BUN 20 14   CREATININE 1.5* 1.1   CALCIUM 9.7 9.3   PROT 6.9  --    ALBUMIN 3.0*  --    BILITOT 1.6*  --    ALKPHOS 129  --    AST 19  --    ALT 19  --    ANIONGAP 13 8     Magnesium:   Recent Labs   Lab 01/07/23  0917   MG 1.5*       Significant Imaging: I have reviewed all pertinent imaging results/findings within the past 24 hours.  X-Ray Chest 1 View  Narrative: EXAMINATION:  XR CHEST 1 VIEW    CLINICAL HISTORY:  Cough, unspecified    TECHNIQUE:  Single frontal view of the chest was performed.    COMPARISON:  01/06/2023    FINDINGS:  The  cardiomediastinal silhouette is not enlarged.  There is calcification of the aorta..  There is no pleural effusion.  The trachea is midline.  The lungs are symmetrically expanded bilaterally with mildly coarse interstitial attenuation accentuated by habitus and shallow inspiratory effort..  No large focal consolidation seen.  There is no pneumothorax.  The osseous structures are remarkable for degenerative changes..  Impression: 1. No acute cardiopulmonary process allowing for shallow inspiratory effort.    Electronically signed by: Jed Field MD  Date:    01/06/2023  Time:    17:30  X-Ray Chest AP Portable  Narrative: EXAMINATION:  XR CHEST AP PORTABLE    CLINICAL HISTORY:  hyperglycemia;    TECHNIQUE:  Single frontal view of the chest was performed.    COMPARISON:  08/18/2019    FINDINGS:  The cardiomediastinal silhouette is prominent, magnified by technique noting calcification of the aorta..  There is no pleural effusion.  The trachea is midline.  The lungs are symmetrically expanded bilaterally with coarse interstitial attenuation accentuated by shallow inspiratory effort and habitus..  No large focal consolidation seen.  There is no pneumothorax.  The osseous structures are remarkable for degenerative change..  Impression: 1. Interstitial findings are likely the result of shallow inspiratory effort and habitus, no large focal consolidation.    Electronically signed by: Jed Field MD  Date:    01/06/2023  Time:    16:26         Assessment/Plan:      * Pneumonia due to infectious organism  -patient with 7 days plus of respiratory tract symptoms, positive for influenza, report outpatient CXR with concern for RLL pneumonia, repeat cxr in our system w/o defined consolidation.   -Patient with leukocytosis, tachypneia, no hypoxic respiratory failure, will be admitted for observation.  Given duration since symptom onset and worsening symptoms, she has received empiric antibiotics for CAP coverage (continued  Ceftriaxone/Azithromycin for 2 days)  -Continue Renal dosed Oseltamivir 30mg po bid x 8 doses --> will adjust to 75 mg for improved CrCl of 61 today  -Provide symptom treatment PRN - anti-tussives, acetaminophen.   -Procal negative, unlikely to benefit from prolonged course of abx, will discontinue      Hypomagnesemia  Patient with noted electrolyte deficiencies with Hypo-Magnesemia. Latest electrolytes have been reviewed and values are   Magnesium   Date Value Ref Range Status   01/07/2023 1.5 (L) 1.6 - 2.6 mg/dL Final   . Will continue to monitor electrolytes closely. Will replace the affected electrolytes and repeat labs to be done after interventions completed.    Hypokalemia  Patient with noted electrolyte deficiencies with Hypo-Kalemia. Latest electrolytes have been reviewed and values are   Potassium   Date Value Ref Range Status   01/07/2023 2.9 (L) 3.5 - 5.1 mmol/L Final   . Will continue to monitor electrolytes closely. Will replace the affected electrolytes and repeat labs to be done after interventions completed.    Diarrhea  Abdominal cramping  -stool studies ordered  -bentyl for cramping  -consider loperamide if stool studies negative    Influenza A  As per pneumonia   -continue oseltamivir for 4 more days, renal dosed  -patient with known sick contact, son tested positive for flu    ISAI (acute kidney injury)  Resolved   Patient with acute kidney injury likely due to IVVD/dehydration and pre-renal azotemia ISAI is currently improving. Labs reviewed- Renal function/electrolytes with Estimated Creatinine Clearance: 61.5 mL/min (based on SCr of 1.1 mg/dL). according to latest data. Monitor urine output and serial BMP and adjust therapy as needed. Avoid nephrotoxins and renally dose meds for GFR listed above.   -s/p 1L of IV fluids in ED, trend chemistry panel   -suspect Influenza and potential polyuria from uncontrolled hyperglycemia contributing to ISAI.      Diabetes mellitus type 2 in obese  Patient's  FSGs are uncontrolled due to hyperglycemia on current medication regimen.  Last A1c reviewed-   Lab Results   Component Value Date    HGBA1C 13.0 (H) 01/07/2023     Most recent fingerstick glucose reviewed-   Recent Labs   Lab 01/06/23  1750   POCTGLUCOSE 389*     Current correctional scale  Low  Increase anti-hyperglycemic dose as follows-   Antihyperglycemics (From admission, onward)    Start     Stop Route Frequency Ordered    01/07/23 2100  insulin detemir U-100 pen 16 Units         -- SubQ Nightly 01/07/23 1738    01/06/23 1943  insulin aspart U-100 pen 0-5 Units         -- SubQ Before meals & nightly PRN 01/06/23 1849        Hold Oral hypoglycemics while patient is in the hospital.  - will refer to endocrinology outpatient for poorly controlled DM  - bland diabetic diet    Hypertension associated with diabetes  HOLD Losartan and HCTZ due to ISAI and with admit for infection  -Coreg restarted      VTE Risk Mitigation (From admission, onward)         Ordered     enoxaparin injection 40 mg  Every 12 hours         01/06/23 1952     IP VTE HIGH RISK PATIENT  Once         01/06/23 1952                Discharge Planning   SHABANA:      Code Status: Full Code   Is the patient medically ready for discharge?: No    Reason for patient still in hospital (select all that apply): Patient new problem, Patient trending condition, Laboratory test and Treatment                     Vianney Dominguez PA-C  Department of Hospital Medicine   [facility]

## 2023-01-07 NOTE — PLAN OF CARE
Problem: Adult Inpatient Plan of Care  Goal: Plan of Care Review  Outcome: Ongoing, Progressing  Goal: Patient-Specific Goal (Individualized)  Outcome: Ongoing, Progressing  Goal: Absence of Hospital-Acquired Illness or Injury  Outcome: Ongoing, Progressing  Goal: Optimal Comfort and Wellbeing  Outcome: Ongoing, Progressing  Goal: Readiness for Transition of Care  Outcome: Ongoing, Progressing     Problem: Bariatric Environmental Safety  Goal: Safety Maintained with Care  Outcome: Ongoing, Progressing     Problem: Diabetes Comorbidity  Goal: Blood Glucose Level Within Targeted Range  Outcome: Ongoing, Progressing     Problem: Fluid and Electrolyte Imbalance (Acute Kidney Injury/Impairment)  Goal: Fluid and Electrolyte Balance  Outcome: Ongoing, Progressing     Problem: Oral Intake Inadequate (Acute Kidney Injury/Impairment)  Goal: Optimal Nutrition Intake  Outcome: Ongoing, Progressing     Problem: Renal Function Impairment (Acute Kidney Injury/Impairment)  Goal: Effective Renal Function  Outcome: Ongoing, Progressing     Problem: Fluid Imbalance (Pneumonia)  Goal: Fluid Balance  Outcome: Ongoing, Progressing     Problem: Infection (Pneumonia)  Goal: Resolution of Infection Signs and Symptoms  Outcome: Ongoing, Progressing     Problem: Respiratory Compromise (Pneumonia)  Goal: Effective Oxygenation and Ventilation  Outcome: Ongoing, Progressing     Problem: Infection  Goal: Absence of Infection Signs and Symptoms  Outcome: Ongoing, Progressing

## 2023-01-07 NOTE — ASSESSMENT & PLAN NOTE
-patient with 7 days plus of respiratory tract symptoms, positive for influenza, report outpatient CXR with concern for RLL pneumonia, repeat cxr in our system w/o defined consolidation.   -Patient with leukocytosis, tachypneia, no hypoxic respiratory failure, will be admitted for observation.  Given duration since symptom onset and worsening symptoms, she has received empiric antibiotics for CAP coverage, can continued Ceftriaxone/Azithromycin for 5 day course  -Continue Renal dosed Oseltamivir 30mg po bid x 8 doses, IF renal function improves rapidly - adjust dosing accordingly. (crcl <50 now)   -Provide symptom treatment PRN - anti-tussives, acetaminophen.

## 2023-01-07 NOTE — ASSESSMENT & PLAN NOTE
HOLD Losartan and HCTZ due to ISAI and with admit for infection  -Will consider restarting Carvedilol 1st if patient with rising blood pressures.

## 2023-01-07 NOTE — PLAN OF CARE
Problem: Adult Inpatient Plan of Care  Goal: Plan of Care Review  Outcome: Ongoing, Progressing  Goal: Absence of Hospital-Acquired Illness or Injury  Outcome: Ongoing, Progressing  Goal: Optimal Comfort and Wellbeing  Outcome: Ongoing, Progressing  Goal: Readiness for Transition of Care  Outcome: Ongoing, Progressing     Problem: Bariatric Environmental Safety  Goal: Safety Maintained with Care  Outcome: Ongoing, Progressing     Problem: Diabetes Comorbidity  Goal: Blood Glucose Level Within Targeted Range  Outcome: Ongoing, Progressing

## 2023-01-07 NOTE — ASSESSMENT & PLAN NOTE
Patient's FSGs are uncontrolled due to hyperglycemia on current medication regimen.  Last A1c reviewed-   Lab Results   Component Value Date    HGBA1C 13.0 (H) 01/07/2023     Most recent fingerstick glucose reviewed-   Recent Labs   Lab 01/06/23  1750   POCTGLUCOSE 389*     Current correctional scale  Low  Increase anti-hyperglycemic dose as follows-   Antihyperglycemics (From admission, onward)    Start     Stop Route Frequency Ordered    01/07/23 2100  insulin detemir U-100 pen 16 Units         -- SubQ Nightly 01/07/23 1738    01/06/23 1943  insulin aspart U-100 pen 0-5 Units         -- SubQ Before meals & nightly PRN 01/06/23 1849        Hold Oral hypoglycemics while patient is in the hospital.  - will refer to endocrinology outpatient for poorly controlled DM  - bland diabetic diet

## 2023-01-07 NOTE — ASSESSMENT & PLAN NOTE
Patient with noted electrolyte deficiencies with Hypo-Kalemia. Latest electrolytes have been reviewed and values are   Potassium   Date Value Ref Range Status   01/07/2023 2.9 (L) 3.5 - 5.1 mmol/L Final   . Will continue to monitor electrolytes closely. Will replace the affected electrolytes and repeat labs to be done after interventions completed.

## 2023-01-07 NOTE — PHARMACY MED REC
"            Admission Medication History     The home medication history was taken by Darcy Thrasher.    You may go to "Admission" then "Reconcile Home Medications" tabs to review and/or act upon these items.     The home medication list has been updated by the Pharmacy department.   Please read ALL comments highlighted in yellow.   Please address this information as you see fit.    Feel free to contact us if you have any questions or require assistance.        Medications listed below were obtained from: Patient/family  PTA Medications   Medication Sig    atorvastatin (LIPITOR) 10 MG tablet   Take 1 tablet (10 mg total) by mouth once daily.    carvediloL (COREG) 12.5 MG tablet   Take 1 tablet (12.5 mg total) by mouth 2 (two) times daily with meals.    cyanocobalamin 500 MCG tablet   Take 500 mcg by mouth once daily.    glimepiride (AMARYL) 4 MG tablet   Take 1 tablet (4 mg total) by mouth daily with breakfast.    hydroCHLOROthiazide (HYDRODIURIL) 25 MG tablet   Take 1 tablet (25 mg total) by mouth once daily.    losartan (COZAAR) 100 MG tablet   Take 1 tablet (100 mg total) by mouth once daily.    multivitamin capsule   Take 1 capsule by mouth once daily.    SITagliptin (JANUVIA) 50 MG Tab   Take 1 tablet (50 mg total) by mouth once daily.    vitamin D 1000 units Tab   Take 1,000 Units by mouth once daily.    alcohol swabs (BD ALCOHOL SWABS) PadM   Apply 1 each topically as needed (blood sugar testing).    blood sugar diagnostic (ONETOUCH VERIO TEST STRIPS) Strp   Use 4 times a day. DX code  E 11.42    ONETOUCH DELICA LANCETS 33 gauge Misc   Inject 1 lancet into the skin 2 (two) times daily.       Darcy Thrasher  EXT [id number]      .        "

## 2023-01-08 VITALS
SYSTOLIC BLOOD PRESSURE: 139 MMHG | HEART RATE: 69 BPM | DIASTOLIC BLOOD PRESSURE: 78 MMHG | OXYGEN SATURATION: 92 % | HEIGHT: 66 IN | WEIGHT: 249 LBS | BODY MASS INDEX: 40.02 KG/M2 | RESPIRATION RATE: 17 BRPM | TEMPERATURE: 98 F

## 2023-01-08 LAB
ANION GAP SERPL CALC-SCNC: 9 MMOL/L (ref 8–16)
BASOPHILS # BLD AUTO: 0.03 K/UL (ref 0–0.2)
BASOPHILS NFR BLD: 0.4 % (ref 0–1.9)
BUN SERPL-MCNC: 15 MG/DL (ref 8–23)
C DIFF GDH STL QL: NEGATIVE
C DIFF TOX A+B STL QL IA: NEGATIVE
CALCIUM SERPL-MCNC: 9.2 MG/DL (ref 8.7–10.5)
CHLORIDE SERPL-SCNC: 101 MMOL/L (ref 95–110)
CO2 SERPL-SCNC: 26 MMOL/L (ref 23–29)
CREAT SERPL-MCNC: 1.2 MG/DL (ref 0.5–1.4)
DIFFERENTIAL METHOD: ABNORMAL
EOSINOPHIL # BLD AUTO: 0.2 K/UL (ref 0–0.5)
EOSINOPHIL NFR BLD: 2.6 % (ref 0–8)
ERYTHROCYTE [DISTWIDTH] IN BLOOD BY AUTOMATED COUNT: 12.9 % (ref 11.5–14.5)
EST. GFR  (NO RACE VARIABLE): 49 ML/MIN/1.73 M^2
GLUCOSE SERPL-MCNC: 171 MG/DL (ref 70–110)
HCT VFR BLD AUTO: 40.3 % (ref 37–48.5)
HGB BLD-MCNC: 12.7 G/DL (ref 12–16)
IMM GRANULOCYTES # BLD AUTO: 0.05 K/UL (ref 0–0.04)
IMM GRANULOCYTES NFR BLD AUTO: 0.7 % (ref 0–0.5)
LYMPHOCYTES # BLD AUTO: 1.7 K/UL (ref 1–4.8)
LYMPHOCYTES NFR BLD: 23.3 % (ref 18–48)
MAGNESIUM SERPL-MCNC: 1.9 MG/DL (ref 1.6–2.6)
MCH RBC QN AUTO: 28.9 PG (ref 27–31)
MCHC RBC AUTO-ENTMCNC: 31.5 G/DL (ref 32–36)
MCV RBC AUTO: 92 FL (ref 82–98)
MONOCYTES # BLD AUTO: 1 K/UL (ref 0.3–1)
MONOCYTES NFR BLD: 13.4 % (ref 4–15)
NEUTROPHILS # BLD AUTO: 4.4 K/UL (ref 1.8–7.7)
NEUTROPHILS NFR BLD: 59.6 % (ref 38–73)
NRBC BLD-RTO: 0 /100 WBC
OB PNL STL: NEGATIVE
PHOSPHATE SERPL-MCNC: 2.2 MG/DL (ref 2.7–4.5)
PLATELET # BLD AUTO: 344 K/UL (ref 150–450)
PMV BLD AUTO: 9.3 FL (ref 9.2–12.9)
POCT GLUCOSE: 172 MG/DL (ref 70–110)
POCT GLUCOSE: 220 MG/DL (ref 70–110)
POTASSIUM SERPL-SCNC: 3.9 MMOL/L (ref 3.5–5.1)
RBC # BLD AUTO: 4.4 M/UL (ref 4–5.4)
SODIUM SERPL-SCNC: 136 MMOL/L (ref 136–145)
WBC # BLD AUTO: 7.3 K/UL (ref 3.9–12.7)
WBC #/AREA STL HPF: NORMAL /[HPF]

## 2023-01-08 PROCEDURE — 83735 ASSAY OF MAGNESIUM: CPT | Performed by: HOSPITALIST

## 2023-01-08 PROCEDURE — 96375 TX/PRO/DX INJ NEW DRUG ADDON: CPT

## 2023-01-08 PROCEDURE — 25000003 PHARM REV CODE 250: Performed by: HOSPITALIST

## 2023-01-08 PROCEDURE — 99239 HOSP IP/OBS DSCHRG MGMT >30: CPT | Mod: ,,,

## 2023-01-08 PROCEDURE — 80048 BASIC METABOLIC PNL TOTAL CA: CPT | Performed by: HOSPITALIST

## 2023-01-08 PROCEDURE — 85025 COMPLETE CBC W/AUTO DIFF WBC: CPT | Performed by: HOSPITALIST

## 2023-01-08 PROCEDURE — G0378 HOSPITAL OBSERVATION PER HR: HCPCS

## 2023-01-08 PROCEDURE — 84100 ASSAY OF PHOSPHORUS: CPT | Performed by: HOSPITALIST

## 2023-01-08 PROCEDURE — 36415 COLL VENOUS BLD VENIPUNCTURE: CPT | Performed by: HOSPITALIST

## 2023-01-08 PROCEDURE — 63600175 PHARM REV CODE 636 W HCPCS

## 2023-01-08 PROCEDURE — 99239 PR HOSPITAL DISCHARGE DAY,>30 MIN: ICD-10-PCS | Mod: ,,,

## 2023-01-08 PROCEDURE — 25000003 PHARM REV CODE 250

## 2023-01-08 RX ORDER — SODIUM,POTASSIUM PHOSPHATES 280-250MG
2 POWDER IN PACKET (EA) ORAL EVERY 4 HOURS
Status: DISCONTINUED | OUTPATIENT
Start: 2023-01-08 | End: 2023-01-08 | Stop reason: HOSPADM

## 2023-01-08 RX ORDER — DEXAMETHASONE SODIUM PHOSPHATE 4 MG/ML
6 INJECTION, SOLUTION INTRA-ARTICULAR; INTRALESIONAL; INTRAMUSCULAR; INTRAVENOUS; SOFT TISSUE EVERY 24 HOURS
Status: DISCONTINUED | OUTPATIENT
Start: 2023-01-08 | End: 2023-01-08 | Stop reason: HOSPADM

## 2023-01-08 RX ORDER — OSELTAMIVIR PHOSPHATE 30 MG/1
30 CAPSULE ORAL 2 TIMES DAILY
Qty: 7 CAPSULE | Refills: 0 | Status: SHIPPED | OUTPATIENT
Start: 2023-01-08 | End: 2023-01-19 | Stop reason: ALTCHOICE

## 2023-01-08 RX ORDER — OSELTAMIVIR PHOSPHATE 30 MG/1
30 CAPSULE ORAL 2 TIMES DAILY
Qty: 7 CAPSULE | Refills: 0 | Status: SHIPPED | OUTPATIENT
Start: 2023-01-08 | End: 2023-01-08 | Stop reason: SDUPTHER

## 2023-01-08 RX ADMIN — BENZONATATE 100 MG: 100 CAPSULE ORAL at 01:01

## 2023-01-08 RX ADMIN — CARVEDILOL 12.5 MG: 12.5 TABLET, FILM COATED ORAL at 08:01

## 2023-01-08 RX ADMIN — ATORVASTATIN CALCIUM 10 MG: 10 TABLET, FILM COATED ORAL at 08:01

## 2023-01-08 RX ADMIN — CYANOCOBALAMIN TAB 250 MCG 500 MCG: 250 TAB at 08:01

## 2023-01-08 RX ADMIN — DEXAMETHASONE SODIUM PHOSPHATE 6 MG: 4 INJECTION INTRA-ARTICULAR; INTRALESIONAL; INTRAMUSCULAR; INTRAVENOUS; SOFT TISSUE at 01:01

## 2023-01-08 RX ADMIN — DICYCLOMINE HYDROCHLORIDE 10 MG: 10 CAPSULE ORAL at 01:01

## 2023-01-08 RX ADMIN — INSULIN ASPART 2 UNITS: 100 INJECTION, SOLUTION INTRAVENOUS; SUBCUTANEOUS at 01:01

## 2023-01-08 RX ADMIN — POTASSIUM & SODIUM PHOSPHATES POWDER PACK 280-160-250 MG 2 PACKET: 280-160-250 PACK at 01:01

## 2023-01-08 RX ADMIN — CHOLECALCIFEROL TAB 25 MCG (1000 UNIT) 1000 UNITS: 25 TAB at 08:01

## 2023-01-08 RX ADMIN — THERA TABS 1 TABLET: TAB at 08:01

## 2023-01-08 RX ADMIN — DICYCLOMINE HYDROCHLORIDE 10 MG: 10 CAPSULE ORAL at 08:01

## 2023-01-08 RX ADMIN — POTASSIUM & SODIUM PHOSPHATES POWDER PACK 280-160-250 MG 2 PACKET: 280-160-250 PACK at 09:01

## 2023-01-08 RX ADMIN — GUAIFENESIN AND DEXTROMETHORPHAN HYDROBROMIDE 1 TABLET: 600; 30 TABLET, EXTENDED RELEASE ORAL at 08:01

## 2023-01-08 RX ADMIN — OSELTAMIVIR PHOSPHATE 75 MG: 75 CAPSULE ORAL at 08:01

## 2023-01-08 NOTE — PLAN OF CARE
Problem: Adult Inpatient Plan of Care  Goal: Plan of Care Review  Outcome: Ongoing, Progressing  Goal: Absence of Hospital-Acquired Illness or Injury  Outcome: Ongoing, Progressing  Goal: Optimal Comfort and Wellbeing  Outcome: Ongoing, Progressing  Goal: Readiness for Transition of Care  Outcome: Ongoing, Progressing     Problem: Bariatric Environmental Safety  Goal: Safety Maintained with Care  Outcome: Ongoing, Progressing     Problem: Fluid and Electrolyte Imbalance (Acute Kidney Injury/Impairment)  Goal: Fluid and Electrolyte Balance  Outcome: Ongoing, Progressing

## 2023-01-09 LAB
CRYPTOSP AG STL QL IA: NEGATIVE
G LAMBLIA AG STL QL IA: NEGATIVE
POCT GLUCOSE: 313 MG/DL (ref 70–110)
RV AG STL QL IA.RAPID: NEGATIVE

## 2023-01-10 LAB
E COLI SXT1 STL QL IA: NEGATIVE
E COLI SXT2 STL QL IA: NEGATIVE
FAT STL QL: NORMAL
NEUTRAL FAT STL QL: NORMAL

## 2023-01-11 LAB
BACTERIA STL CULT: NORMAL
BACTERIA STL CULT: NORMAL

## 2023-01-12 LAB
ELASTASE 1, FECAL: 281 MCG/G
O+P STL MICRO: NORMAL

## 2023-01-13 LAB — CALPROTECTIN STL-MCNT: <27.1 MCG/G

## 2023-01-16 LAB
H PYLORI AG STL QL IA: NOT DETECTED
SPECIMEN SOURCE: NORMAL

## 2023-01-19 ENCOUNTER — OFFICE VISIT (OUTPATIENT)
Dept: INTERNAL MEDICINE | Facility: CLINIC | Age: 68
End: 2023-01-19
Payer: MEDICARE

## 2023-01-19 ENCOUNTER — TELEPHONE (OUTPATIENT)
Dept: ADMINISTRATIVE | Facility: HOSPITAL | Age: 68
End: 2023-01-19
Payer: MEDICARE

## 2023-01-19 VITALS
SYSTOLIC BLOOD PRESSURE: 134 MMHG | WEIGHT: 235 LBS | DIASTOLIC BLOOD PRESSURE: 78 MMHG | OXYGEN SATURATION: 98 % | BODY MASS INDEX: 37.77 KG/M2 | HEART RATE: 68 BPM | HEIGHT: 66 IN

## 2023-01-19 DIAGNOSIS — I10 PRIMARY HYPERTENSION: Primary | ICD-10-CM

## 2023-01-19 DIAGNOSIS — L98.9 SKIN LESION: ICD-10-CM

## 2023-01-19 DIAGNOSIS — E78.5 HYPERLIPIDEMIA, UNSPECIFIED HYPERLIPIDEMIA TYPE: ICD-10-CM

## 2023-01-19 DIAGNOSIS — J11.1 INFLUENZA: ICD-10-CM

## 2023-01-19 DIAGNOSIS — E11.69 DIABETES MELLITUS TYPE 2 IN OBESE: Chronic | ICD-10-CM

## 2023-01-19 DIAGNOSIS — E66.9 DIABETES MELLITUS TYPE 2 IN OBESE: Chronic | ICD-10-CM

## 2023-01-19 DIAGNOSIS — Z12.31 ENCOUNTER FOR SCREENING MAMMOGRAM FOR BREAST CANCER: ICD-10-CM

## 2023-01-19 PROCEDURE — 3008F BODY MASS INDEX DOCD: CPT | Mod: CPTII,S$GLB,, | Performed by: INTERNAL MEDICINE

## 2023-01-19 PROCEDURE — 99214 OFFICE O/P EST MOD 30 MIN: CPT | Mod: S$GLB,,, | Performed by: INTERNAL MEDICINE

## 2023-01-19 PROCEDURE — 3072F PR LOW RISK FOR RETINOPATHY: ICD-10-PCS | Mod: CPTII,S$GLB,, | Performed by: INTERNAL MEDICINE

## 2023-01-19 PROCEDURE — 1101F PR PT FALLS ASSESS DOC 0-1 FALLS W/OUT INJ PAST YR: ICD-10-PCS | Mod: CPTII,S$GLB,, | Performed by: INTERNAL MEDICINE

## 2023-01-19 PROCEDURE — 3072F LOW RISK FOR RETINOPATHY: CPT | Mod: CPTII,S$GLB,, | Performed by: INTERNAL MEDICINE

## 2023-01-19 PROCEDURE — 3078F PR MOST RECENT DIASTOLIC BLOOD PRESSURE < 80 MM HG: ICD-10-PCS | Mod: CPTII,S$GLB,, | Performed by: INTERNAL MEDICINE

## 2023-01-19 PROCEDURE — 3288F PR FALLS RISK ASSESSMENT DOCUMENTED: ICD-10-PCS | Mod: CPTII,S$GLB,, | Performed by: INTERNAL MEDICINE

## 2023-01-19 PROCEDURE — 1160F RVW MEDS BY RX/DR IN RCRD: CPT | Mod: CPTII,S$GLB,, | Performed by: INTERNAL MEDICINE

## 2023-01-19 PROCEDURE — 99499 UNLISTED E&M SERVICE: CPT | Mod: S$GLB,,, | Performed by: INTERNAL MEDICINE

## 2023-01-19 PROCEDURE — 1159F MED LIST DOCD IN RCRD: CPT | Mod: CPTII,S$GLB,, | Performed by: INTERNAL MEDICINE

## 2023-01-19 PROCEDURE — 99499 RISK ADDL DX/OHS AUDIT: ICD-10-PCS | Mod: S$GLB,,, | Performed by: INTERNAL MEDICINE

## 2023-01-19 PROCEDURE — 99999 PR PBB SHADOW E&M-EST. PATIENT-LVL V: CPT | Mod: PBBFAC,,, | Performed by: INTERNAL MEDICINE

## 2023-01-19 PROCEDURE — 3075F PR MOST RECENT SYSTOLIC BLOOD PRESS GE 130-139MM HG: ICD-10-PCS | Mod: CPTII,S$GLB,, | Performed by: INTERNAL MEDICINE

## 2023-01-19 PROCEDURE — 3288F FALL RISK ASSESSMENT DOCD: CPT | Mod: CPTII,S$GLB,, | Performed by: INTERNAL MEDICINE

## 2023-01-19 PROCEDURE — 1126F AMNT PAIN NOTED NONE PRSNT: CPT | Mod: CPTII,S$GLB,, | Performed by: INTERNAL MEDICINE

## 2023-01-19 PROCEDURE — 1101F PT FALLS ASSESS-DOCD LE1/YR: CPT | Mod: CPTII,S$GLB,, | Performed by: INTERNAL MEDICINE

## 2023-01-19 PROCEDURE — 3075F SYST BP GE 130 - 139MM HG: CPT | Mod: CPTII,S$GLB,, | Performed by: INTERNAL MEDICINE

## 2023-01-19 PROCEDURE — 1159F PR MEDICATION LIST DOCUMENTED IN MEDICAL RECORD: ICD-10-PCS | Mod: CPTII,S$GLB,, | Performed by: INTERNAL MEDICINE

## 2023-01-19 PROCEDURE — 3008F PR BODY MASS INDEX (BMI) DOCUMENTED: ICD-10-PCS | Mod: CPTII,S$GLB,, | Performed by: INTERNAL MEDICINE

## 2023-01-19 PROCEDURE — 3078F DIAST BP <80 MM HG: CPT | Mod: CPTII,S$GLB,, | Performed by: INTERNAL MEDICINE

## 2023-01-19 PROCEDURE — 3046F HEMOGLOBIN A1C LEVEL >9.0%: CPT | Mod: CPTII,S$GLB,, | Performed by: INTERNAL MEDICINE

## 2023-01-19 PROCEDURE — 1126F PR PAIN SEVERITY QUANTIFIED, NO PAIN PRESENT: ICD-10-PCS | Mod: CPTII,S$GLB,, | Performed by: INTERNAL MEDICINE

## 2023-01-19 PROCEDURE — 3046F PR MOST RECENT HEMOGLOBIN A1C LEVEL > 9.0%: ICD-10-PCS | Mod: CPTII,S$GLB,, | Performed by: INTERNAL MEDICINE

## 2023-01-19 PROCEDURE — 99999 PR PBB SHADOW E&M-EST. PATIENT-LVL V: ICD-10-PCS | Mod: PBBFAC,,, | Performed by: INTERNAL MEDICINE

## 2023-01-19 PROCEDURE — 99214 PR OFFICE/OUTPT VISIT, EST, LEVL IV, 30-39 MIN: ICD-10-PCS | Mod: S$GLB,,, | Performed by: INTERNAL MEDICINE

## 2023-01-19 PROCEDURE — 1160F PR REVIEW ALL MEDS BY PRESCRIBER/CLIN PHARMACIST DOCUMENTED: ICD-10-PCS | Mod: CPTII,S$GLB,, | Performed by: INTERNAL MEDICINE

## 2023-01-19 NOTE — ASSESSMENT & PLAN NOTE
Resolved  Patient with noted electrolyte deficiencies with Hypo-Magnesemia. Latest electrolytes have been reviewed and values are   Magnesium   Date Value Ref Range Status   01/08/2023 1.9 1.6 - 2.6 mg/dL Final   . Will continue to monitor electrolytes closely. Will replace the affected electrolytes and repeat labs to be done after interventions completed.

## 2023-01-19 NOTE — ASSESSMENT & PLAN NOTE
Resolved  Patient with noted electrolyte deficiencies with Hypo-Kalemia. Latest electrolytes have been reviewed and values are   Potassium   Date Value Ref Range Status   01/08/2023 3.9 3.5 - 5.1 mmol/L Final   . Will continue to monitor electrolytes closely. Will replace the affected electrolytes and repeat labs to be done after interventions completed.

## 2023-01-19 NOTE — ASSESSMENT & PLAN NOTE
HOLD Losartan and HCTZ due to ISAI and with admit for infection  -Coreg restarted, resume other antihypertensives at discharge

## 2023-01-19 NOTE — ASSESSMENT & PLAN NOTE
Patient's FSGs are uncontrolled due to hyperglycemia on current medication regimen.  Last A1c reviewed-   Lab Results   Component Value Date    HGBA1C 13.0 (H) 01/07/2023   Hold Oral hypoglycemics while patient is in the hospital.  - will refer to endocrinology outpatient for poorly controlled DM  - recommend bland diabetic diet at discharge until cramping subsides >> then regular diabetic diet

## 2023-01-19 NOTE — ASSESSMENT & PLAN NOTE
As per pneumonia   -patient with known sick contact, son tested positive for flu  -discharged home with 30 mg BID oselvatavir (considering her Crcl <60 on day of discharge) to competed 5 full days of treatment.

## 2023-01-19 NOTE — DISCHARGE SUMMARY
[facility] [address] [place] [address] Medicine  Discharge Summary      Patient Name: Maria Alejandra De La Rosa  MRN: [id number]  HILDA: [id number]  Patient Class: OP- Observation  Admission Date: 1/6/2023  Hospital Length of Stay: 0 days  Discharge Date and Time: 1/8/2023  5:37 PM  Attending Physician: No att. providers found   Discharging Provider: Vianney Dominguez PA-C  Primary Care Provider: Caryn Ambrosio MD  [facility] Medicine Team: [facility] MED F Vianney Dominguez PA-C  Primary Care Team: [facility] MED F    HPI:   67-year-old woman with a history of hypertension, type 2 diabetes, morbid obesity who presents to the emergency department at the [facility] urgent care for concerns of uncontrolled hyperglycemia and possible right lower lobe pneumonia.  She was seen at [facility] urgent care earlier today she has been reporting over the last 9-10 days symptoms sore throat URI type with cough productive of sputum, fatigue poor appetite and intermittent nausea.  She has a known sick contact her son tested positive for influenza about 9 days ago.    Her glucose earlier today in clinic was greater than 400 in the last week she has not been monitoring her point of care glucoses, she does not take insulin.    ED workup is notable for mild leukocytosis hypokalemia, ISAI is present, hyperglycemic workup is negative for serum ketones and patient has a non acidotic pH, serum Oz and are pending.    She does feel better since receiving antitussives and anti nausea medicine in the emergency department she tested positive for influenza a and has received a dose of oseltamivir.  She will be admitted for observation for concerns of pneumonia.    Review of systems she denies any chest pain palpitations lower extremity swelling, no sleep disturbances, she does have reduced appetite, polyuria, polydipsia.    Meds reviewed & reconciled at bedside    ED treatment: Ceftriaxone, Azithromycin, Zofran, Dextromethorphan Guaif, Oseltamivir, 1L of  LR      * No surgery found *      Hospital Course:   Maria Alejandra De La Rosa was placed in hospital medicine observation for severe symptoms of influenza. She remained afebrile and hemodynamically stable on RA. She was treated with ceftriaxone, azithromycin, and oseltamivir. She developed profuse, large volume, watery diarrhea and abdominal cramping. C diff and stool studies negative. She also had uncontrolled blood sugars, not consistent with DKA and A1c 13.0. Placed ambulatory referral  to endocrinology. Electrolytes required replacement. Flu symptoms greatly improved. Discharged home with 30 mg BID oselvatavir (considering her Crcl <60 on day of discharge) to competed 5 full days of treatment. Follow up with PCP. All questions were answered. Patient acknowledged understanding of discharge instructions and feels safe to discharge home. Patient was discharged on 1/8/2023 in stable condition.        Goals of Care Treatment Preferences:  Code Status: Full Code      Consults:     Hypomagnesemia  Resolved  Patient with noted electrolyte deficiencies with Hypo-Magnesemia. Latest electrolytes have been reviewed and values are   Magnesium   Date Value Ref Range Status   01/08/2023 1.9 1.6 - 2.6 mg/dL Final   . Will continue to monitor electrolytes closely. Will replace the affected electrolytes and repeat labs to be done after interventions completed.    Hypokalemia  Resolved  Patient with noted electrolyte deficiencies with Hypo-Kalemia. Latest electrolytes have been reviewed and values are   Potassium   Date Value Ref Range Status   01/08/2023 3.9 3.5 - 5.1 mmol/L Final   . Will continue to monitor electrolytes closely. Will replace the affected electrolytes and repeat labs to be done after interventions completed.    Diarrhea  Abdominal cramping  -stool studies negative  -bentyl for cramping  resolved prior to discharge    Influenza A  As per pneumonia   -patient with known sick contact, son tested positive for flu  -discharged  home with 30 mg BID oselvatavir (considering her Crcl <60 on day of discharge) to competed 5 full days of treatment.      ISAI (acute kidney injury)  Resolved   Patient with acute kidney injury likely due to IVVD/dehydration and pre-renal azotemia ISAI is currently improving. Labs reviewed- Renal function/electrolytes with CrCl cannot be calculated (Patient's most recent lab result is older than the maximum 7 days allowed.). according to latest data. Monitor urine output and serial BMP and adjust therapy as needed. Avoid nephrotoxins and renally dose meds for GFR listed above.   -s/p 1L of IV fluids in ED, trend chemistry panel   -suspect Influenza and potential polyuria from uncontrolled hyperglycemia contributing to ISAI.      Pneumonia due to infectious organism  -patient with 7 days plus of respiratory tract symptoms, positive for influenza, report outpatient CXR with concern for RLL pneumonia, repeat cxr in our system w/o defined consolidation.   -Patient with leukocytosis, tachypneia, no hypoxic respiratory failure, will be admitted for observation.  Given duration since symptom onset and worsening symptoms, she has received empiric antibiotics for CAP coverage (continued Ceftriaxone/Azithromycin for 2 days)  -Continue Renal dosed Oseltamivir 30mg po bid x 8 doses   -Procal negative, unlikely to benefit from prolonged course of abx, will discontinue      Diabetes mellitus type 2 in obese  Patient's FSGs are uncontrolled due to hyperglycemia on current medication regimen.  Last A1c reviewed-   Lab Results   Component Value Date    HGBA1C 13.0 (H) 01/07/2023   Hold Oral hypoglycemics while patient is in the hospital.  - will refer to endocrinology outpatient for poorly controlled DM  - recommend bland diabetic diet at discharge until cramping subsides >> then regular diabetic diet    Hypertension associated with diabetes  HOLD Losartan and HCTZ due to ISAI and with admit for infection  -Coreg restarted, resume other  antihypertensives at discharge      Final Active Diagnoses:    Diagnosis Date Noted POA    PRINCIPAL PROBLEM:  Abdominal cramping [R10.9] 01/07/2023 Yes    Diarrhea [R19.7] 01/07/2023 Yes    Hypokalemia [E87.6] 01/07/2023 Yes    Hypomagnesemia [E83.42] 01/07/2023 Yes    Pneumonia due to infectious organism [J18.9] 01/06/2023 Yes    ISAI (acute kidney injury) [N17.9] 01/06/2023 Yes    Influenza A [J10.1] 01/06/2023 Yes    Diabetes mellitus type 2 in obese [E11.69, E66.9] 02/01/2018 Yes     Chronic    Hypertension associated with diabetes [E11.59, I15.2] 01/03/2013 Yes      Problems Resolved During this Admission:       Discharged Condition: good    Disposition: Home or Self Care    Follow Up:   Follow-up Information     Caryn Ambrosio MD Follow up in 1 week(s).    Specialty: Internal Medicine  Why: Improvement s/p flu  Blood glucose managment  Contact information:  [address] [place] [id number]  [phone]                       Patient Instructions:      Ambulatory referral/consult to Endocrinology   Standing Status: Future   Referral Priority: Urgent Referral Type: Consultation   Requested Specialty: Endocrinology   Number of Visits Requested: 1     Diet diabetic     Notify your health care provider if you experience any of the following:  temperature >100.4     Notify your health care provider if you experience any of the following:  persistent nausea and vomiting or diarrhea     Notify your health care provider if you experience any of the following:  difficulty breathing or increased cough     Notify your health care provider if you experience any of the following:  persistent dizziness, light-headedness, or visual disturbances     Notify your health care provider if you experience any of the following:  increased confusion or weakness     Activity as tolerated       Significant Diagnostic Studies: na    Pending Diagnostic Studies:     None         Medications:  Reconciled Home  Medications:      Medication List      CONTINUE taking these medications    alcohol swabs Padm  Commonly known as: BD ALCOHOL SWABS  Apply 1 each topically as needed (blood sugar testing).     atorvastatin 10 MG tablet  Commonly known as: LIPITOR  Take 1 tablet (10 mg total) by mouth once daily.     carvediloL 12.5 MG tablet  Commonly known as: COREG  Take 1 tablet (12.5 mg total) by mouth 2 (two) times daily with meals.     cyanocobalamin 500 MCG tablet  Take 500 mcg by mouth once daily.     glimepiride 4 MG tablet  Commonly known as: AMARYL  Take 1 tablet (4 mg total) by mouth daily with breakfast.     hydroCHLOROthiazide 25 MG tablet  Commonly known as: HYDRODIURIL  Take 1 tablet (25 mg total) by mouth once daily.     losartan 100 MG tablet  Commonly known as: COZAAR  Take 1 tablet (100 mg total) by mouth once daily.     multivitamin capsule  Take 1 capsule by mouth once daily.     ONETOUCH DELICA LANCETS 33 gauge Misc  Generic drug: lancets  Inject 1 lancet into the skin 2 (two) times daily.     ONETOUCH VERIO TEST STRIPS Strp  Generic drug: blood sugar diagnostic  Use 4 times a day. DX code  E 11.42     SITagliptin phosphate 50 MG Tab  Commonly known as: JANUVIA  Take 1 tablet (50 mg total) by mouth once daily.     vitamin D 1000 units Tab  Commonly known as: VITAMIN D3  Take 1,000 Units by mouth once daily.        ASK your doctor about these medications    oseltamivir 30 MG capsule  Commonly known as: TAMIFLU  Take 1 capsule (30 mg total) by mouth 2 (two) times daily. for 7 doses  Ask about: Should I take this medication?            Indwelling Lines/Drains at time of discharge:   Lines/Drains/Airways     None                 Time spent on the discharge of patient: 36minutes         Vianney Dominguez PA-C  Department of Hospital Medicine  [facility]

## 2023-01-19 NOTE — ASSESSMENT & PLAN NOTE
-patient with 7 days plus of respiratory tract symptoms, positive for influenza, report outpatient CXR with concern for RLL pneumonia, repeat cxr in our system w/o defined consolidation.   -Patient with leukocytosis, tachypneia, no hypoxic respiratory failure, will be admitted for observation.  Given duration since symptom onset and worsening symptoms, she has received empiric antibiotics for CAP coverage (continued Ceftriaxone/Azithromycin for 2 days)  -Continue Renal dosed Oseltamivir 30mg po bid x 8 doses   -Procal negative, unlikely to benefit from prolonged course of abx, will discontinue

## 2023-01-19 NOTE — ASSESSMENT & PLAN NOTE
Resolved   Patient with acute kidney injury likely due to IVVD/dehydration and pre-renal azotemia ISAI is currently improving. Labs reviewed- Renal function/electrolytes with CrCl cannot be calculated (Patient's most recent lab result is older than the maximum 7 days allowed.). according to latest data. Monitor urine output and serial BMP and adjust therapy as needed. Avoid nephrotoxins and renally dose meds for GFR listed above.   -s/p 1L of IV fluids in ED, trend chemistry panel   -suspect Influenza and potential polyuria from uncontrolled hyperglycemia contributing to ISAI.

## 2023-01-19 NOTE — PROGRESS NOTES
Subjective:       Patient ID: Maria Alejandra De La Rosa is a 67 y.o. female.    Chief Complaint: Follow-up  Transitional Care Note    Family and/or Caretaker present at visit?  No.  Diagnostic tests reviewed/disposition: No diagnosic tests pending after this hospitalization.  Disease/illness education: pt educated   Home health/community services discussion/referrals: Patient does not have home health established from hospital visit.  They do not need home health.  If needed, we will set up home health for the patient.   Establishment or re-establishment of referral orders for community resources: No other necessary community resources.   Discussion with other health care providers: No discussion with other health care providers necessary.   Endocrinology follow up as planned     This a 67-year-old who presents today for follow-up she has been for several years due to her concerns about COVID she has mostly been staying home reports that she did come in May and at that time her diabetes regimen was switched she was having difficulty affording her injections she had been taking glimepiride along with Januvia 50 mg and reports for while her sugars were doing well but she stopped checking them and then when she got sick she was eating anything and drinking anything she wanted because she had been sick for a while her A1c was quite high and she has been watching her diet since then remain on her medications and has scheduled an endocrine follow-up which she has in the next week or 2.  Today her blood sugar at home was 131 on her current regimen.  She did recover from the flu is have no the further cough or shortness of breath.  Patient reports history of previous depression but denies bipolar history and she would like that removed from her chart she was never treated or diagnosed with that that she is aware of she has been doing well currently no medications     HPI  Review of Systems   Constitutional:  Negative for fever.  "  Respiratory:  Negative for shortness of breath.    Cardiovascular:  Negative for chest pain.     Objective:    Blood pressure 134/78, pulse 68, height 5' 6" (1.676 m), weight 106.6 kg (235 lb 0.2 oz), SpO2 98 %.   Physical Exam  Constitutional:       General: She is not in acute distress.  HENT:      Head: Normocephalic.      Comments: Skin lesion      Mouth/Throat:      Pharynx: Oropharynx is clear.   Eyes:      General: No scleral icterus.  Cardiovascular:      Rate and Rhythm: Normal rate and regular rhythm.      Heart sounds: Normal heart sounds. No murmur heard.    No friction rub. No gallop.   Pulmonary:      Effort: Pulmonary effort is normal. No respiratory distress.      Breath sounds: Normal breath sounds.   Abdominal:      General: Bowel sounds are normal.      Palpations: Abdomen is soft. There is no mass.      Tenderness: There is no abdominal tenderness.   Musculoskeletal:      Cervical back: Neck supple.      Right foot: No deformity.      Left foot: No deformity.   Feet:      Right foot:      Protective Sensation: 4 sites tested.  4 sites sensed.      Left foot:      Protective Sensation: 4 sites tested.  4 sites sensed.   Skin:     Findings: No erythema.   Neurological:      Mental Status: She is alert.   Psychiatric:         Mood and Affect: Mood normal.       Assessment:       1. Primary hypertension    2. Hyperlipidemia, unspecified hyperlipidemia type    3. Diabetes mellitus type 2 in obese    4. Encounter for screening mammogram for breast cancer    5. Influenza          Plan:       Maria Alejandra was seen today for follow-up.    Diagnoses and all orders for this visit:    Primary hypertension  Blood pressure acceptable continue present regimen    Hyperlipidemia, unspecified hyperlipidemia type  She remains on statin will update her blood work at her follow-up      Diabetes mellitus type 2 in obese  With recent hyperglycemia blood sugar today was 131 and she has modified her diet she has endocrine " appointment and currently taking Januvia and glimepiride may need upward adjustment if her numbers remain elevated she has tried to eat better and her weight is down since her last visit    Encounter for screening mammogram for breast cancer  -     Mammo Digital Screening Bilat w/ Evert; Future    Skin lesion dermatology referral placed    Influenza  Recent episode of Has resolved  Flu shot recommended    She had her fit kit this year we discussed colonoscopy when due  Mammogram order in for scheduling    Resume blood sugar monitoring call with elevations  Follow-up 3 months

## 2023-01-23 ENCOUNTER — TELEPHONE (OUTPATIENT)
Dept: ADMINISTRATIVE | Facility: HOSPITAL | Age: 68
End: 2023-01-23
Payer: MEDICARE

## 2023-02-06 ENCOUNTER — OFFICE VISIT (OUTPATIENT)
Dept: ENDOCRINOLOGY | Facility: CLINIC | Age: 68
End: 2023-02-06
Payer: MEDICARE

## 2023-02-06 VITALS
WEIGHT: 236 LBS | TEMPERATURE: 98 F | DIASTOLIC BLOOD PRESSURE: 68 MMHG | BODY MASS INDEX: 38.09 KG/M2 | SYSTOLIC BLOOD PRESSURE: 145 MMHG | HEART RATE: 62 BPM

## 2023-02-06 DIAGNOSIS — E66.9 DIABETES MELLITUS TYPE 2 IN OBESE: Chronic | ICD-10-CM

## 2023-02-06 DIAGNOSIS — E11.9 CONTROLLED TYPE 2 DIABETES MELLITUS WITHOUT COMPLICATION, WITHOUT LONG-TERM CURRENT USE OF INSULIN: Primary | ICD-10-CM

## 2023-02-06 DIAGNOSIS — E78.00 HYPERCHOLESTEROLEMIA: ICD-10-CM

## 2023-02-06 DIAGNOSIS — E11.69 DIABETES MELLITUS TYPE 2 IN OBESE: Chronic | ICD-10-CM

## 2023-02-06 PROCEDURE — 3077F PR MOST RECENT SYSTOLIC BLOOD PRESSURE >= 140 MM HG: ICD-10-PCS | Mod: CPTII,S$GLB,, | Performed by: HOSPITALIST

## 2023-02-06 PROCEDURE — 1101F PR PT FALLS ASSESS DOC 0-1 FALLS W/OUT INJ PAST YR: ICD-10-PCS | Mod: CPTII,S$GLB,, | Performed by: HOSPITALIST

## 2023-02-06 PROCEDURE — 3046F HEMOGLOBIN A1C LEVEL >9.0%: CPT | Mod: CPTII,S$GLB,, | Performed by: HOSPITALIST

## 2023-02-06 PROCEDURE — 1159F MED LIST DOCD IN RCRD: CPT | Mod: CPTII,S$GLB,, | Performed by: HOSPITALIST

## 2023-02-06 PROCEDURE — 3072F PR LOW RISK FOR RETINOPATHY: ICD-10-PCS | Mod: CPTII,S$GLB,, | Performed by: HOSPITALIST

## 2023-02-06 PROCEDURE — 1159F PR MEDICATION LIST DOCUMENTED IN MEDICAL RECORD: ICD-10-PCS | Mod: CPTII,S$GLB,, | Performed by: HOSPITALIST

## 2023-02-06 PROCEDURE — 99204 PR OFFICE/OUTPT VISIT, NEW, LEVL IV, 45-59 MIN: ICD-10-PCS | Mod: S$GLB,,, | Performed by: HOSPITALIST

## 2023-02-06 PROCEDURE — 4010F PR ACE/ARB THEARPY RXD/TAKEN: ICD-10-PCS | Mod: CPTII,S$GLB,, | Performed by: HOSPITALIST

## 2023-02-06 PROCEDURE — 1101F PT FALLS ASSESS-DOCD LE1/YR: CPT | Mod: CPTII,S$GLB,, | Performed by: HOSPITALIST

## 2023-02-06 PROCEDURE — 1160F PR REVIEW ALL MEDS BY PRESCRIBER/CLIN PHARMACIST DOCUMENTED: ICD-10-PCS | Mod: CPTII,S$GLB,, | Performed by: HOSPITALIST

## 2023-02-06 PROCEDURE — 3008F BODY MASS INDEX DOCD: CPT | Mod: CPTII,S$GLB,, | Performed by: HOSPITALIST

## 2023-02-06 PROCEDURE — 99999 PR PBB SHADOW E&M-EST. PATIENT-LVL IV: CPT | Mod: PBBFAC,,, | Performed by: HOSPITALIST

## 2023-02-06 PROCEDURE — 3046F PR MOST RECENT HEMOGLOBIN A1C LEVEL > 9.0%: ICD-10-PCS | Mod: CPTII,S$GLB,, | Performed by: HOSPITALIST

## 2023-02-06 PROCEDURE — 3008F PR BODY MASS INDEX (BMI) DOCUMENTED: ICD-10-PCS | Mod: CPTII,S$GLB,, | Performed by: HOSPITALIST

## 2023-02-06 PROCEDURE — 3288F FALL RISK ASSESSMENT DOCD: CPT | Mod: CPTII,S$GLB,, | Performed by: HOSPITALIST

## 2023-02-06 PROCEDURE — 1160F RVW MEDS BY RX/DR IN RCRD: CPT | Mod: CPTII,S$GLB,, | Performed by: HOSPITALIST

## 2023-02-06 PROCEDURE — 1126F AMNT PAIN NOTED NONE PRSNT: CPT | Mod: CPTII,S$GLB,, | Performed by: HOSPITALIST

## 2023-02-06 PROCEDURE — 99999 PR PBB SHADOW E&M-EST. PATIENT-LVL IV: ICD-10-PCS | Mod: PBBFAC,,, | Performed by: HOSPITALIST

## 2023-02-06 PROCEDURE — 1126F PR PAIN SEVERITY QUANTIFIED, NO PAIN PRESENT: ICD-10-PCS | Mod: CPTII,S$GLB,, | Performed by: HOSPITALIST

## 2023-02-06 PROCEDURE — 3078F DIAST BP <80 MM HG: CPT | Mod: CPTII,S$GLB,, | Performed by: HOSPITALIST

## 2023-02-06 PROCEDURE — 3072F LOW RISK FOR RETINOPATHY: CPT | Mod: CPTII,S$GLB,, | Performed by: HOSPITALIST

## 2023-02-06 PROCEDURE — 3077F SYST BP >= 140 MM HG: CPT | Mod: CPTII,S$GLB,, | Performed by: HOSPITALIST

## 2023-02-06 PROCEDURE — 4010F ACE/ARB THERAPY RXD/TAKEN: CPT | Mod: CPTII,S$GLB,, | Performed by: HOSPITALIST

## 2023-02-06 PROCEDURE — 3288F PR FALLS RISK ASSESSMENT DOCUMENTED: ICD-10-PCS | Mod: CPTII,S$GLB,, | Performed by: HOSPITALIST

## 2023-02-06 PROCEDURE — 3078F PR MOST RECENT DIASTOLIC BLOOD PRESSURE < 80 MM HG: ICD-10-PCS | Mod: CPTII,S$GLB,, | Performed by: HOSPITALIST

## 2023-02-06 PROCEDURE — 99204 OFFICE O/P NEW MOD 45 MIN: CPT | Mod: S$GLB,,, | Performed by: HOSPITALIST

## 2023-02-06 NOTE — ASSESSMENT & PLAN NOTE
- Diabetes is not at goal, given most current A1C, and recent hospital admission for hyperglycemia  - Goal A1C for patient is 7%  - diabetes is complicated by dietary indiscretion, now with much better glycemic control due to dietary changes  - Diabetic supplies/medications were reviewed this visit to ensure continue steady supplies  - Advised patient to get routine feet care, routine eye exam, plus routine maintenance screening  - Diabetes goal including glucose glucose and A1C goals discussed    Plan  - Adjustment made:  Continue Januvia 50 mg daily, glimepiride 4 mg daily  - monitor for hypoglycemia  - Encouragement of dietary modification, portion size control, decreasing carbohydrates intake  - Advised pt to check glucoses regularly, asked to filled glucose log and bring back for review at next office visit  - check lab work in 3 months re-evaluate  - Clear written instruction given on AVS. Follow up as scheduled

## 2023-02-06 NOTE — PROGRESS NOTES
Subjective:      Patient ID: Maria Alejandra De La Rosa is a 67 y.o. female presented to [facility] on 2/6/2023.  Chief Complaint:  Diabetes      History of Present Illness: Maria Alejandra De La Rsoa is a 67 y.o. female here for  type 2 diabetes  Other significant past medical history:  Obesity, hypertension    With regards to Diabetes Mellitus Type 2  - Known diabetic complications: none  - Diagnosed w/ DM: in 2014  - Diabetes Education: No  - Patient with recent hospital admission 1/6/2023 to 1/8/2023 for uncontrolled hyperglycemia and possible right lower lobe pneumonia.  Her glucose earlier in clinic was greater than 400 in the last week she has not been monitoring her glucose, she does not take insulin. ED workup is notable for mild leukocytosis hypokalemia, ISAI is present, hyperglycemic workup is negative for serum ketones and patient has a non acidotic pH.  A1c found to be 13.0%.      Interval history:  Recent worsening diabetes due to dietary indiscretion, steroid use.  Recent hospital admission for pneumonia hyperglycemia.  Since discharge patient has been making dietary modification at home.  Glucose much better at home on glucose log.  Now eating better, she is cooking her own food, eating better.     Glucose this   Glucometer data review:  107, 70, 85, 80, 84, 90, 124  65    Current reported meds:               Glimepiride 4mg daily   Januvia 50mg  Misses medication doses - Yes/no  Injection Technique: Good  Rotation of injection site: Yes   Previous meds tried:              Trulicity >> too expensive, last used 6/2022  Home glucose checks: checks daily , Logs reviewed  - Hypoglycemia symptoms:  DENIES   Diet/Exercise:   - Eating 3x meals per day               - Snacking               - Drink: water, crystral light  Sleeping:   - Any difficulty falling asleep, staying asleep, nighttime coughing, or partner complaints of snoring?  No  - What time do you usually go to bed? What time do you usually  wake up? 10-6  Weight trend: stable  Diabetes Related Hospitalization:  No  Hx of pancreatitis: No, denies  Family history of diabetes: Yes  Occupation:  Retired    Eye exam current (within one year): yes, DR: no, unknown  Reports cuts or ulcers on feet:   Denies, has podiatrist  Statin: Taking, ACE/ARB: Taking    Diabetes lab work  Lab Results   Component Value Date    HGBA1C 13.0 (H) 01/07/2023    HGBA1C 5.9 (H) 05/24/2022    HGBA1C 6.3 (H) 07/08/2019    HGBA1C 11.9 (H) 01/31/2019     No results found for: CPEPTIDE, GLUTAMICACID, ISLETCELLANT   No results found for: FRUCTOSAMINE  Lab Results   Component Value Date    MICALBCREAT 4.0 09/18/2014     No results found for: [id number]    Diabetes Management Status: Reviewed this office visit  Screening or Prevention Patient's value Goal Complete/Controlled?   Lipid profile : 05/24/2022 Annually Yes     Dilated retinal exam : 10/11/2022 Annually Yes     Foot exam   : 01/19/2023 Annually Yes        Reviewed past surgical, medical, family, social history and updated as appropriate.  Review of Systems: see HPI above  Objective:   BP (!) 145/68 (BP Location: Left arm)   Pulse 62   Temp 98 °F (36.7 °C) (Oral)   Wt 107 kg (236 lb)   BMI 38.09 kg/m²   Body mass index is 38.09 kg/m².  Vital signs reviewed    Physical Exam  Vitals and nursing note reviewed.   Constitutional:       Appearance: Normal appearance. She is well-developed. She is obese. She is not ill-appearing.   Neck:      Thyroid: No thyromegaly.   Pulmonary:      Effort: Pulmonary effort is normal. No respiratory distress.   Musculoskeletal:         General: Normal range of motion.      Cervical back: Normal range of motion.   Neurological:      General: No focal deficit present.      Mental Status: She is alert. Mental status is at baseline.   Psychiatric:         Mood and Affect: Mood normal.         Behavior: Behavior normal.     Lab Reviewed:  See results in subjective  Lab Results   Component Value Date     HGBA1C 13.0 (H) 01/07/2023     Lab Results   Component Value Date    CHOL 99 (L) 05/24/2022    HDL 49 05/24/2022    LDLCALC 39.0 (L) 05/24/2022    TRIG 55 05/24/2022    CHOLHDL 49.5 05/24/2022     Lab Results   Component Value Date     01/08/2023    K 3.9 01/08/2023     01/08/2023    CO2 26 01/08/2023     (H) 01/08/2023    BUN 15 01/08/2023    CREATININE 1.2 01/08/2023    CALCIUM 9.2 01/08/2023    PHOS 2.2 (L) 01/08/2023    PROT 6.9 01/06/2023    ALBUMIN 3.0 (L) 01/06/2023    BILITOT 1.6 (H) 01/06/2023    ALKPHOS 129 01/06/2023    AST 19 01/06/2023    ALT 19 01/06/2023    ANIONGAP 9 01/08/2023    ESTGFRAFRICA >60.0 05/24/2022    EGFRNONAA >60.0 05/24/2022    TSH 3.670 05/24/2022     Assessment     1. Controlled type 2 diabetes mellitus without complication, without long-term current use of insulin  Hemoglobin A1C    Basic Metabolic Panel      2. Diabetes mellitus type 2 in obese  Ambulatory referral/consult to Endocrinology      3. Hypercholesterolemia           Plan     Controlled type 2 diabetes mellitus without complication, without long-term current use of insulin  - Diabetes is not at goal, given most current A1C, and recent hospital admission for hyperglycemia  - Goal A1C for patient is 7%  - diabetes is complicated by dietary indiscretion, now with much better glycemic control due to dietary changes  - Diabetic supplies/medications were reviewed this visit to ensure continue steady supplies  - Advised patient to get routine feet care, routine eye exam, plus routine maintenance screening  - Diabetes goal including glucose glucose and A1C goals discussed    Plan  - Adjustment made:  Continue Januvia 50 mg daily, glimepiride 4 mg daily  - monitor for hypoglycemia  - Encouragement of dietary modification, portion size control, decreasing carbohydrates intake  - Advised pt to check glucoses regularly, asked to filled glucose log and bring back for review at next office visit  - check lab work  in 3 months re-evaluate  - Clear written instruction given on AVS. Follow up as scheduled    Diabetes mellitus type 2 in obese  - Body mass index is 38.09 kg/m².  - dietary discussion as above  - unable to afford Trulicity    Hypercholesterolemia  - lipid panel review today  - ASCVD Risk below: Statin: Taking  The ASCVD Risk score (Tea LAU, et al., 2019) failed to calculate for the following reasons:    The valid total cholesterol range is 130 to 320 mg/dL      Advised patient to follow up with PCP for routine health maintenance care.   RTC in 3 months      Anmol Baugh M.D.  Endocrinology  [facility]  2/6/2023      Disclaimer: This note has been generated in part with the use of voice-recognition software. There may be typographical errors that have been missed during proof-reading.

## 2023-02-06 NOTE — PROGRESS NOTES
Patient, Maria Alejandra De La Rosa (MRN #[id number]), presented with a recorded BMI of 38.09 kg/m^2 and a documented comorbidity(s):  - Diabetes Mellitus Type 2  - Hyperlipidemia  to which the severe obesity is a contributing factor. This is consistent with the definition of severe obesity (BMI 35.0-39.9) with comorbidity (ICD-10 E66.01, Z68.35). The patient's severe obesity was monitored, evaluated, addressed and/or treated. This addendum to the medical record is made on 02/06/2023.

## 2023-02-06 NOTE — ASSESSMENT & PLAN NOTE
- lipid panel review today  - ASCVD Risk below: Statin: Taking  The ASCVD Risk score (Tea LAU, et al., 2019) failed to calculate for the following reasons:    The valid total cholesterol range is 130 to 320 mg/dL

## 2023-02-07 DIAGNOSIS — Z00.00 ENCOUNTER FOR MEDICARE ANNUAL WELLNESS EXAM: ICD-10-CM

## 2023-02-09 DIAGNOSIS — Z00.00 ENCOUNTER FOR MEDICARE ANNUAL WELLNESS EXAM: ICD-10-CM

## 2023-03-09 ENCOUNTER — HOSPITAL ENCOUNTER (OUTPATIENT)
Dept: RADIOLOGY | Facility: HOSPITAL | Age: 68
Discharge: HOME | End: 2023-03-09
Attending: INTERNAL MEDICINE
Payer: MEDICARE

## 2023-03-09 VITALS — BODY MASS INDEX: 36.96 KG/M2 | WEIGHT: 230 LBS | HEIGHT: 66 IN

## 2023-03-09 DIAGNOSIS — Z12.31 ENCOUNTER FOR SCREENING MAMMOGRAM FOR BREAST CANCER: ICD-10-CM

## 2023-03-09 PROCEDURE — 77063 BREAST TOMOSYNTHESIS BI: CPT | Mod: 26,,, | Performed by: RADIOLOGY

## 2023-03-09 PROCEDURE — 77067 SCR MAMMO BI INCL CAD: CPT | Mod: 26,,, | Performed by: RADIOLOGY

## 2023-03-09 PROCEDURE — 77063 MAMMO DIGITAL SCREENING BILAT WITH TOMO: ICD-10-PCS | Mod: 26,,, | Performed by: RADIOLOGY

## 2023-03-09 PROCEDURE — 77067 MAMMO DIGITAL SCREENING BILAT WITH TOMO: ICD-10-PCS | Mod: 26,,, | Performed by: RADIOLOGY

## 2023-03-09 PROCEDURE — 77067 SCR MAMMO BI INCL CAD: CPT | Mod: TC

## 2023-04-03 ENCOUNTER — PATIENT MESSAGE (OUTPATIENT)
Dept: ADMINISTRATIVE | Facility: HOSPITAL | Age: 68
End: 2023-04-03
Payer: MEDICARE

## 2023-04-05 ENCOUNTER — PATIENT OUTREACH (OUTPATIENT)
Dept: ADMINISTRATIVE | Facility: HOSPITAL | Age: 68
End: 2023-04-05
Payer: MEDICARE

## 2023-04-05 NOTE — PROGRESS NOTES
Health Maintenance Due   Topic Date Due    Influenza Vaccine (1) 09/01/2022      Chart reviewed. Triggered LINKS. Updated Care Everywhere. Scheduled uACR on 4.28.23    Freeman Chau CMA  Population Health Care Coordinator  Primary Care Team

## 2023-04-10 PROBLEM — N17.9 AKI (ACUTE KIDNEY INJURY): Status: RESOLVED | Noted: 2023-01-06 | Resolved: 2023-04-10

## 2023-04-24 PROBLEM — J18.9 PNEUMONIA DUE TO INFECTIOUS ORGANISM: Status: RESOLVED | Noted: 2023-01-06 | Resolved: 2023-04-24

## 2023-04-26 ENCOUNTER — TELEPHONE (OUTPATIENT)
Dept: ENDOCRINOLOGY | Facility: CLINIC | Age: 68
End: 2023-04-26
Payer: MEDICARE

## 2023-04-26 DIAGNOSIS — E11.69 DIABETES MELLITUS TYPE 2 IN OBESE: ICD-10-CM

## 2023-04-26 DIAGNOSIS — E66.9 DIABETES MELLITUS TYPE 2 IN OBESE: ICD-10-CM

## 2023-04-26 RX ORDER — GLIMEPIRIDE 4 MG/1
4 TABLET ORAL 2 TIMES DAILY
Qty: 180 TABLET | Refills: 3 | Status: SHIPPED | OUTPATIENT
Start: 2023-04-26 | End: 2023-05-05

## 2023-04-26 NOTE — TELEPHONE ENCOUNTER
----- Message from Iliana Matthew sent at 4/26/2023 11:28 AM CDT -----  Type: Patient Call Back    Who called:Self     What is the request in detail: Asking for a call from Nurse states she have been running high on her couch says she need a refill on strips     Can the clinic reply by [facility]? No     Would the patient rather a call back or a response via [facility]? CALL     Best call back number: [phone]

## 2023-04-26 NOTE — TELEPHONE ENCOUNTER
Pt stated her sugars have been running high, highest was on 4/17 at 8:50 a.m. (501). Pt has labs this Friday and comes in to see provider 5/5. Advised pt to keep checking her sugars and bring in her log for Dr Baugh to review so he can make a recommendation. Understanding verbalized.

## 2023-04-26 NOTE — TELEPHONE ENCOUNTER
Pt given provider message below.    Given that her sugar has been high, can you let her know I would like to increase her glipizide to 4 mg twice a day.  A refill has been sent to [facility] with test strips.     Understanding verbalized

## 2023-04-28 ENCOUNTER — LAB VISIT (OUTPATIENT)
Dept: LAB | Facility: HOSPITAL | Age: 68
End: 2023-04-28
Attending: HOSPITALIST
Payer: MEDICARE

## 2023-04-28 DIAGNOSIS — E11.9 CONTROLLED TYPE 2 DIABETES MELLITUS WITHOUT COMPLICATION, WITHOUT LONG-TERM CURRENT USE OF INSULIN: ICD-10-CM

## 2023-04-28 LAB
ANION GAP SERPL CALC-SCNC: 9 MMOL/L (ref 8–16)
BUN SERPL-MCNC: 22 MG/DL (ref 8–23)
CALCIUM SERPL-MCNC: 9.7 MG/DL (ref 8.7–10.5)
CHLORIDE SERPL-SCNC: 104 MMOL/L (ref 95–110)
CO2 SERPL-SCNC: 27 MMOL/L (ref 23–29)
CREAT SERPL-MCNC: 1.1 MG/DL (ref 0.5–1.4)
EST. GFR  (NO RACE VARIABLE): 54.1 ML/MIN/1.73 M^2
ESTIMATED AVG GLUCOSE: 206 MG/DL (ref 68–131)
GLUCOSE SERPL-MCNC: 260 MG/DL (ref 70–110)
HBA1C MFR BLD: 8.8 % (ref 4–5.6)
POTASSIUM SERPL-SCNC: 3.7 MMOL/L (ref 3.5–5.1)
SODIUM SERPL-SCNC: 140 MMOL/L (ref 136–145)

## 2023-04-28 PROCEDURE — 80048 BASIC METABOLIC PNL TOTAL CA: CPT | Performed by: HOSPITALIST

## 2023-04-28 PROCEDURE — 36415 COLL VENOUS BLD VENIPUNCTURE: CPT | Mod: PO | Performed by: HOSPITALIST

## 2023-04-28 PROCEDURE — 83036 HEMOGLOBIN GLYCOSYLATED A1C: CPT | Performed by: HOSPITALIST

## 2023-05-05 ENCOUNTER — OFFICE VISIT (OUTPATIENT)
Dept: ENDOCRINOLOGY | Facility: CLINIC | Age: 68
End: 2023-05-05
Payer: MEDICARE

## 2023-05-05 VITALS
WEIGHT: 231 LBS | HEART RATE: 59 BPM | SYSTOLIC BLOOD PRESSURE: 148 MMHG | DIASTOLIC BLOOD PRESSURE: 73 MMHG | TEMPERATURE: 98 F | BODY MASS INDEX: 37.28 KG/M2

## 2023-05-05 DIAGNOSIS — E66.01 CLASS 2 SEVERE OBESITY DUE TO EXCESS CALORIES WITH SERIOUS COMORBIDITY AND BODY MASS INDEX (BMI) OF 37.0 TO 37.9 IN ADULT: ICD-10-CM

## 2023-05-05 DIAGNOSIS — E78.00 HYPERCHOLESTEROLEMIA: ICD-10-CM

## 2023-05-05 DIAGNOSIS — E11.65 UNCONTROLLED TYPE 2 DIABETES MELLITUS WITH HYPERGLYCEMIA, WITHOUT LONG-TERM CURRENT USE OF INSULIN: Primary | ICD-10-CM

## 2023-05-05 PROCEDURE — 4010F ACE/ARB THERAPY RXD/TAKEN: CPT | Mod: CPTII,S$GLB,, | Performed by: HOSPITALIST

## 2023-05-05 PROCEDURE — 3008F PR BODY MASS INDEX (BMI) DOCUMENTED: ICD-10-PCS | Mod: CPTII,S$GLB,, | Performed by: HOSPITALIST

## 2023-05-05 PROCEDURE — 4010F PR ACE/ARB THEARPY RXD/TAKEN: ICD-10-PCS | Mod: CPTII,S$GLB,, | Performed by: HOSPITALIST

## 2023-05-05 PROCEDURE — 1160F RVW MEDS BY RX/DR IN RCRD: CPT | Mod: CPTII,S$GLB,, | Performed by: HOSPITALIST

## 2023-05-05 PROCEDURE — 99999 PR PBB SHADOW E&M-EST. PATIENT-LVL IV: CPT | Mod: PBBFAC,,, | Performed by: HOSPITALIST

## 2023-05-05 PROCEDURE — 1160F PR REVIEW ALL MEDS BY PRESCRIBER/CLIN PHARMACIST DOCUMENTED: ICD-10-PCS | Mod: CPTII,S$GLB,, | Performed by: HOSPITALIST

## 2023-05-05 PROCEDURE — 1159F PR MEDICATION LIST DOCUMENTED IN MEDICAL RECORD: ICD-10-PCS | Mod: CPTII,S$GLB,, | Performed by: HOSPITALIST

## 2023-05-05 PROCEDURE — 1159F MED LIST DOCD IN RCRD: CPT | Mod: CPTII,S$GLB,, | Performed by: HOSPITALIST

## 2023-05-05 PROCEDURE — 99214 PR OFFICE/OUTPT VISIT, EST, LEVL IV, 30-39 MIN: ICD-10-PCS | Mod: S$GLB,,, | Performed by: HOSPITALIST

## 2023-05-05 PROCEDURE — 3066F PR DOCUMENTATION OF TREATMENT FOR NEPHROPATHY: ICD-10-PCS | Mod: CPTII,S$GLB,, | Performed by: HOSPITALIST

## 2023-05-05 PROCEDURE — 3052F PR MOST RECENT HEMOGLOBIN A1C LEVEL 8.0 - < 9.0%: ICD-10-PCS | Mod: CPTII,S$GLB,, | Performed by: HOSPITALIST

## 2023-05-05 PROCEDURE — 3078F PR MOST RECENT DIASTOLIC BLOOD PRESSURE < 80 MM HG: ICD-10-PCS | Mod: CPTII,S$GLB,, | Performed by: HOSPITALIST

## 2023-05-05 PROCEDURE — 3288F PR FALLS RISK ASSESSMENT DOCUMENTED: ICD-10-PCS | Mod: CPTII,S$GLB,, | Performed by: HOSPITALIST

## 2023-05-05 PROCEDURE — 3052F HG A1C>EQUAL 8.0%<EQUAL 9.0%: CPT | Mod: CPTII,S$GLB,, | Performed by: HOSPITALIST

## 2023-05-05 PROCEDURE — 1101F PT FALLS ASSESS-DOCD LE1/YR: CPT | Mod: CPTII,S$GLB,, | Performed by: HOSPITALIST

## 2023-05-05 PROCEDURE — 3008F BODY MASS INDEX DOCD: CPT | Mod: CPTII,S$GLB,, | Performed by: HOSPITALIST

## 2023-05-05 PROCEDURE — 3072F PR LOW RISK FOR RETINOPATHY: ICD-10-PCS | Mod: CPTII,S$GLB,, | Performed by: HOSPITALIST

## 2023-05-05 PROCEDURE — 3061F PR NEG MICROALBUMINURIA RESULT DOCUMENTED/REVIEW: ICD-10-PCS | Mod: CPTII,S$GLB,, | Performed by: HOSPITALIST

## 2023-05-05 PROCEDURE — 3061F NEG MICROALBUMINURIA REV: CPT | Mod: CPTII,S$GLB,, | Performed by: HOSPITALIST

## 2023-05-05 PROCEDURE — 99214 OFFICE O/P EST MOD 30 MIN: CPT | Mod: S$GLB,,, | Performed by: HOSPITALIST

## 2023-05-05 PROCEDURE — 99999 PR PBB SHADOW E&M-EST. PATIENT-LVL IV: ICD-10-PCS | Mod: PBBFAC,,, | Performed by: HOSPITALIST

## 2023-05-05 PROCEDURE — 3066F NEPHROPATHY DOC TX: CPT | Mod: CPTII,S$GLB,, | Performed by: HOSPITALIST

## 2023-05-05 PROCEDURE — 3077F PR MOST RECENT SYSTOLIC BLOOD PRESSURE >= 140 MM HG: ICD-10-PCS | Mod: CPTII,S$GLB,, | Performed by: HOSPITALIST

## 2023-05-05 PROCEDURE — 1101F PR PT FALLS ASSESS DOC 0-1 FALLS W/OUT INJ PAST YR: ICD-10-PCS | Mod: CPTII,S$GLB,, | Performed by: HOSPITALIST

## 2023-05-05 PROCEDURE — 3072F LOW RISK FOR RETINOPATHY: CPT | Mod: CPTII,S$GLB,, | Performed by: HOSPITALIST

## 2023-05-05 PROCEDURE — 3077F SYST BP >= 140 MM HG: CPT | Mod: CPTII,S$GLB,, | Performed by: HOSPITALIST

## 2023-05-05 PROCEDURE — 3288F FALL RISK ASSESSMENT DOCD: CPT | Mod: CPTII,S$GLB,, | Performed by: HOSPITALIST

## 2023-05-05 PROCEDURE — 3078F DIAST BP <80 MM HG: CPT | Mod: CPTII,S$GLB,, | Performed by: HOSPITALIST

## 2023-05-05 RX ORDER — GLIPIZIDE AND METFORMIN HCL 5; 500 MG/1; MG/1
1 TABLET, FILM COATED ORAL
Qty: 180 TABLET | Refills: 3 | Status: SHIPPED | OUTPATIENT
Start: 2023-05-05 | End: 2023-12-08 | Stop reason: SDUPTHER

## 2023-05-05 RX ORDER — SEMAGLUTIDE 0.68 MG/ML
0.5 INJECTION, SOLUTION SUBCUTANEOUS
Qty: 3 ML | Refills: 5 | Status: SHIPPED | OUTPATIENT
Start: 2023-05-05 | End: 2023-11-20 | Stop reason: SDUPTHER

## 2023-05-05 NOTE — PROGRESS NOTES
Subjective:      Patient ID: Maria Alejandra De La Rosa is a 68 y.o. female presented to [facility] on 5/5/2023.  Chief Complaint:  Diabetes      History of Present Illness: Maria Alejandra De La Rosa is a 68 y.o. female here for  type 2 diabetes  Other significant past medical history:  Obesity, hypertension    With regards to Diabetes Mellitus Type 2  - Known diabetic complications: none  - Diagnosed w/ DM: in 2014  - Diabetes Education: No  - Patient with recent hospital admission 1/6/2023 to 1/8/2023 for uncontrolled hyperglycemia and possible right lower lobe pneumonia.  - Her glucose earlier in clinic was greater than 400 in the last week she has not been monitoring her glucose, she does not take insulin. ED workup is notable for mild leukocytosis hypokalemia, ISAI is present, hyperglycemic workup is negative for serum ketones and patient has a non acidotic pH.  A1c found to be 13.0%.      Interval history:  Patient is here for diabetes management A1c 8.8.  Patient glucose log show severe hyperglycemia before breakfast.  Of note did have much better glucose in February with glucose ranging from 120-170 consistently.  Patient denies any steroid use.  Does endorse dietary indiscretion lately  Previously patient was cooking her own food    Current reported meds:               Glimepiride 4mg daily   Januvia 50mg daily  Misses medication doses - Yes/no  Injection Technique: Good  Rotation of injection site: Yes   Previous meds tried:              Trulicity >> too expensive, last used 6/2022  Home glucose checks: checks daily , Logs reviewed  - most recent severe hypoglycemia  - no hypoglycemia noted  303  346  304  324  316  243  271  315  255  501  438  198    Diet/Exercise:   - Eating 3x meals per day, eating mostly preprepared TV dinner              - Drink: water, crystral light  Sleeping:   - Any difficulty falling asleep, staying asleep, nighttime coughing, or partner complaints of snoring?  No  - What time  do you usually go to bed? What time do you usually wake up? 10-6  Weight trend: stable  Diabetes Related Hospitalization:  No  Hx of pancreatitis: No, denies  Family history of diabetes: Yes  Occupation:  Retired    Eye exam current (within one year): yes, DR: no, unknown  Reports cuts or ulcers on feet:   Denies, has podiatrist  Statin: Taking, ACE/ARB: Taking    Diabetes lab work  Lab Results   Component Value Date    HGBA1C 8.8 (H) 04/28/2023    HGBA1C 13.0 (H) 01/07/2023    HGBA1C 5.9 (H) 05/24/2022    HGBA1C 6.3 (H) 07/08/2019     No results found for: CPEPTIDE, GLUTAMICACID, ISLETCELLANT   No results found for: FRUCTOSAMINE  Lab Results   Component Value Date    MICALBCREAT Unable to calculate 04/28/2023     No results found for: [id number]    Diabetes Management Status: Reviewed this office visit  Screening or Prevention Patient's value Goal Complete/Controlled?   Lipid profile : 05/24/2022 Annually Yes     Dilated retinal exam : 10/11/2022 Annually Yes     Foot exam   : 01/19/2023 Annually Yes        Reviewed past surgical, medical, family, social history and updated as appropriate.  Review of Systems: see HPI above  Objective:   BP (!) 148/73 (BP Location: Left arm)   Pulse (!) 59   Temp 98 °F (36.7 °C) (Oral)   Wt 104.8 kg (231 lb)   BMI 37.28 kg/m²   Body mass index is 37.28 kg/m².  Vital signs reviewed    Physical Exam  Vitals and nursing note reviewed.   Constitutional:       Appearance: Normal appearance. She is well-developed. She is obese. She is not ill-appearing.   Neck:      Thyroid: No thyromegaly.   Pulmonary:      Effort: Pulmonary effort is normal. No respiratory distress.   Musculoskeletal:         General: Normal range of motion.      Cervical back: Normal range of motion.   Neurological:      General: No focal deficit present.      Mental Status: She is alert. Mental status is at baseline.   Psychiatric:         Mood and Affect: Mood normal.         Behavior: Behavior normal.     Lab  Reviewed:  See results in subjective  Lab Results   Component Value Date    HGBA1C 8.8 (H) 04/28/2023     Lab Results   Component Value Date    CHOL 99 (L) 05/24/2022    HDL 49 05/24/2022    LDLCALC 39.0 (L) 05/24/2022    TRIG 55 05/24/2022    CHOLHDL 49.5 05/24/2022     Lab Results   Component Value Date     04/28/2023    K 3.7 04/28/2023     04/28/2023    CO2 27 04/28/2023     (H) 04/28/2023    BUN 22 04/28/2023    CREATININE 1.1 04/28/2023    CALCIUM 9.7 04/28/2023    PHOS 2.2 (L) 01/08/2023    PROT 6.9 01/06/2023    ALBUMIN 3.0 (L) 01/06/2023    BILITOT 1.6 (H) 01/06/2023    ALKPHOS 129 01/06/2023    AST 19 01/06/2023    ALT 19 01/06/2023    ANIONGAP 9 04/28/2023    ESTGFRAFRICA >60.0 05/24/2022    EGFRNONAA >60.0 05/24/2022    TSH 3.670 05/24/2022     Assessment     1. Uncontrolled type 2 diabetes mellitus with hyperglycemia, without long-term current use of insulin  semaglutide (OZEMPIC) 0.25 mg or 0.5 mg (2 mg/3 mL) pen injector    glipizide-metformin (METAGLIP) 5-500 mg per tablet    Hemoglobin A1C    Basic Metabolic Panel    C-Peptide      2. Hypercholesterolemia        3. Class 2 severe obesity due to excess calories with serious comorbidity and body mass index (BMI) of 37.0 to 37.9 in adult           Plan     Uncontrolled type 2 diabetes mellitus with hyperglycemia, without long-term current use of insulin  - Diabetes is not at goal, given most current A1C, and recent hospital admission for hyperglycemia  - Goal A1C for patient is 7%  - diabetes is complicated by dietary indiscretion, poor insight to diabetes  - Diabetic supplies/medications were reviewed this visit to ensure continue steady supplies  - Advised patient to get routine feet care, routine eye exam, plus routine maintenance screening  - Diabetes goal including glucose glucose and A1C goals discussed    Plan  - Adjustment made:  Stop Januvia, start patient on Ozempic 0.5 mg once a week injection, demo shown in clinic  -  Start patient on glipizide/metformin combination pill, 5/500 mg 1 tablet b.i.d, with goal to increase to 2 tablets b.i.d.  - Encouragement of dietary modification, portion size control, decreasing carbohydrates intake  - Advised pt to check glucoses regularly, asked to filled glucose log and bring back for review at next office visit  - check lab work in 3 months re-evaluate  - Clear written instruction given on AVS. Follow up as scheduled    Hypercholesterolemia  - lipid panel review today  - ASCVD Risk below: Statin: Taking  The ASCVD Risk score (Tea DK, et al., 2019) failed to calculate for the following reasons:    The valid total cholesterol range is 130 to 320 mg/dL    Class 2 severe obesity due to excess calories with serious comorbidity in adult  - Body mass index is 37.28 kg/m².  - dietary discussion as above  - continue to monitor weight  - start patient on Ozempic      Advised patient to follow up with PCP for routine health maintenance care.   RTC in 3 months    Anmol Baugh M.D.  Endocrinology  [facility]  5/5/2023      Disclaimer: This note has been generated in part with the use of voice-recognition software. There may be typographical errors that have been missed during proof-reading.

## 2023-05-05 NOTE — PATIENT INSTRUCTIONS
Glipizide/Metformin 5/500mg 1 pill  twice a day with food    Ozempic 0.5mg once a week injection   Start at 0.25mg once week for 4 shot/week before increasing to 0.5mg    IF glucose Is still consistently  greater than 300, call bird Baugh M.D  [facility] [address] [facility] [address], [address]  [place] [address]    Office:  [phone]  Fax:  [phone]    
Develop coping skills.  For example, strategies and lifestyle changes that reduce negative moods, stress, and exposure to smoking cues.

## 2023-05-05 NOTE — ASSESSMENT & PLAN NOTE
- Diabetes is not at goal, given most current A1C, and recent hospital admission for hyperglycemia  - Goal A1C for patient is 7%  - diabetes is complicated by dietary indiscretion, poor insight to diabetes  - Diabetic supplies/medications were reviewed this visit to ensure continue steady supplies  - Advised patient to get routine feet care, routine eye exam, plus routine maintenance screening  - Diabetes goal including glucose glucose and A1C goals discussed    Plan  - Adjustment made:  Stop Januvia, start patient on Ozempic 0.5 mg once a week injection, demo shown in clinic  - Start patient on glipizide/metformin combination pill, 5/500 mg 1 tablet b.i.d, with goal to increase to 2 tablets b.i.d.  - Encouragement of dietary modification, portion size control, decreasing carbohydrates intake  - Advised pt to check glucoses regularly, asked to filled glucose log and bring back for review at next office visit  - check lab work in 3 months re-evaluate  - Clear written instruction given on AVS. Follow up as scheduled

## 2023-05-05 NOTE — ASSESSMENT & PLAN NOTE
- Body mass index is 37.28 kg/m².  - dietary discussion as above  - continue to monitor weight  - start patient on Ozempic

## 2023-05-10 ENCOUNTER — OFFICE VISIT (OUTPATIENT)
Dept: INTERNAL MEDICINE | Facility: CLINIC | Age: 68
End: 2023-05-10
Payer: MEDICARE

## 2023-05-10 VITALS
HEART RATE: 65 BPM | WEIGHT: 229.06 LBS | SYSTOLIC BLOOD PRESSURE: 134 MMHG | BODY MASS INDEX: 36.81 KG/M2 | OXYGEN SATURATION: 97 % | HEIGHT: 66 IN | DIASTOLIC BLOOD PRESSURE: 70 MMHG

## 2023-05-10 DIAGNOSIS — F43.23 ADJUSTMENT REACTION WITH ANXIETY AND DEPRESSION: ICD-10-CM

## 2023-05-10 DIAGNOSIS — E66.9 DIABETES MELLITUS TYPE 2 IN OBESE: Primary | Chronic | ICD-10-CM

## 2023-05-10 DIAGNOSIS — I15.2 HYPERTENSION ASSOCIATED WITH DIABETES: ICD-10-CM

## 2023-05-10 DIAGNOSIS — E78.5 HYPERLIPIDEMIA ASSOCIATED WITH TYPE 2 DIABETES MELLITUS: ICD-10-CM

## 2023-05-10 DIAGNOSIS — E11.69 DIABETES MELLITUS TYPE 2 IN OBESE: Primary | Chronic | ICD-10-CM

## 2023-05-10 DIAGNOSIS — E11.69 HYPERLIPIDEMIA ASSOCIATED WITH TYPE 2 DIABETES MELLITUS: ICD-10-CM

## 2023-05-10 DIAGNOSIS — E11.59 HYPERTENSION ASSOCIATED WITH DIABETES: ICD-10-CM

## 2023-05-10 PROBLEM — E87.6 HYPOKALEMIA: Status: RESOLVED | Noted: 2023-01-07 | Resolved: 2023-05-10

## 2023-05-10 PROBLEM — E83.42 HYPOMAGNESEMIA: Status: RESOLVED | Noted: 2023-01-07 | Resolved: 2023-05-10

## 2023-05-10 PROBLEM — J10.1 INFLUENZA A: Status: RESOLVED | Noted: 2023-01-06 | Resolved: 2023-05-10

## 2023-05-10 PROCEDURE — 1160F PR REVIEW ALL MEDS BY PRESCRIBER/CLIN PHARMACIST DOCUMENTED: ICD-10-PCS | Mod: CPTII,S$GLB,, | Performed by: PHYSICIAN ASSISTANT

## 2023-05-10 PROCEDURE — 3061F NEG MICROALBUMINURIA REV: CPT | Mod: CPTII,S$GLB,, | Performed by: PHYSICIAN ASSISTANT

## 2023-05-10 PROCEDURE — 3288F PR FALLS RISK ASSESSMENT DOCUMENTED: ICD-10-PCS | Mod: CPTII,S$GLB,, | Performed by: PHYSICIAN ASSISTANT

## 2023-05-10 PROCEDURE — 1101F PT FALLS ASSESS-DOCD LE1/YR: CPT | Mod: CPTII,S$GLB,, | Performed by: PHYSICIAN ASSISTANT

## 2023-05-10 PROCEDURE — 1126F AMNT PAIN NOTED NONE PRSNT: CPT | Mod: CPTII,S$GLB,, | Performed by: PHYSICIAN ASSISTANT

## 2023-05-10 PROCEDURE — 3008F BODY MASS INDEX DOCD: CPT | Mod: CPTII,S$GLB,, | Performed by: PHYSICIAN ASSISTANT

## 2023-05-10 PROCEDURE — 3052F HG A1C>EQUAL 8.0%<EQUAL 9.0%: CPT | Mod: CPTII,S$GLB,, | Performed by: PHYSICIAN ASSISTANT

## 2023-05-10 PROCEDURE — 3072F LOW RISK FOR RETINOPATHY: CPT | Mod: CPTII,S$GLB,, | Performed by: PHYSICIAN ASSISTANT

## 2023-05-10 PROCEDURE — 1160F RVW MEDS BY RX/DR IN RCRD: CPT | Mod: CPTII,S$GLB,, | Performed by: PHYSICIAN ASSISTANT

## 2023-05-10 PROCEDURE — 1101F PR PT FALLS ASSESS DOC 0-1 FALLS W/OUT INJ PAST YR: ICD-10-PCS | Mod: CPTII,S$GLB,, | Performed by: PHYSICIAN ASSISTANT

## 2023-05-10 PROCEDURE — 3066F PR DOCUMENTATION OF TREATMENT FOR NEPHROPATHY: ICD-10-PCS | Mod: CPTII,S$GLB,, | Performed by: PHYSICIAN ASSISTANT

## 2023-05-10 PROCEDURE — 3288F FALL RISK ASSESSMENT DOCD: CPT | Mod: CPTII,S$GLB,, | Performed by: PHYSICIAN ASSISTANT

## 2023-05-10 PROCEDURE — 3052F PR MOST RECENT HEMOGLOBIN A1C LEVEL 8.0 - < 9.0%: ICD-10-PCS | Mod: CPTII,S$GLB,, | Performed by: PHYSICIAN ASSISTANT

## 2023-05-10 PROCEDURE — 99999 PR PBB SHADOW E&M-EST. PATIENT-LVL V: ICD-10-PCS | Mod: PBBFAC,,, | Performed by: PHYSICIAN ASSISTANT

## 2023-05-10 PROCEDURE — 1126F PR PAIN SEVERITY QUANTIFIED, NO PAIN PRESENT: ICD-10-PCS | Mod: CPTII,S$GLB,, | Performed by: PHYSICIAN ASSISTANT

## 2023-05-10 PROCEDURE — 3075F PR MOST RECENT SYSTOLIC BLOOD PRESS GE 130-139MM HG: ICD-10-PCS | Mod: CPTII,S$GLB,, | Performed by: PHYSICIAN ASSISTANT

## 2023-05-10 PROCEDURE — 4010F PR ACE/ARB THEARPY RXD/TAKEN: ICD-10-PCS | Mod: CPTII,S$GLB,, | Performed by: PHYSICIAN ASSISTANT

## 2023-05-10 PROCEDURE — 3008F PR BODY MASS INDEX (BMI) DOCUMENTED: ICD-10-PCS | Mod: CPTII,S$GLB,, | Performed by: PHYSICIAN ASSISTANT

## 2023-05-10 PROCEDURE — 3072F PR LOW RISK FOR RETINOPATHY: ICD-10-PCS | Mod: CPTII,S$GLB,, | Performed by: PHYSICIAN ASSISTANT

## 2023-05-10 PROCEDURE — 3078F DIAST BP <80 MM HG: CPT | Mod: CPTII,S$GLB,, | Performed by: PHYSICIAN ASSISTANT

## 2023-05-10 PROCEDURE — 1159F MED LIST DOCD IN RCRD: CPT | Mod: CPTII,S$GLB,, | Performed by: PHYSICIAN ASSISTANT

## 2023-05-10 PROCEDURE — 4010F ACE/ARB THERAPY RXD/TAKEN: CPT | Mod: CPTII,S$GLB,, | Performed by: PHYSICIAN ASSISTANT

## 2023-05-10 PROCEDURE — 1159F PR MEDICATION LIST DOCUMENTED IN MEDICAL RECORD: ICD-10-PCS | Mod: CPTII,S$GLB,, | Performed by: PHYSICIAN ASSISTANT

## 2023-05-10 PROCEDURE — 3078F PR MOST RECENT DIASTOLIC BLOOD PRESSURE < 80 MM HG: ICD-10-PCS | Mod: CPTII,S$GLB,, | Performed by: PHYSICIAN ASSISTANT

## 2023-05-10 PROCEDURE — 3075F SYST BP GE 130 - 139MM HG: CPT | Mod: CPTII,S$GLB,, | Performed by: PHYSICIAN ASSISTANT

## 2023-05-10 PROCEDURE — 99999 PR PBB SHADOW E&M-EST. PATIENT-LVL V: CPT | Mod: PBBFAC,,, | Performed by: PHYSICIAN ASSISTANT

## 2023-05-10 PROCEDURE — 99214 OFFICE O/P EST MOD 30 MIN: CPT | Mod: S$GLB,,, | Performed by: PHYSICIAN ASSISTANT

## 2023-05-10 PROCEDURE — 3066F NEPHROPATHY DOC TX: CPT | Mod: CPTII,S$GLB,, | Performed by: PHYSICIAN ASSISTANT

## 2023-05-10 PROCEDURE — 3061F PR NEG MICROALBUMINURIA RESULT DOCUMENTED/REVIEW: ICD-10-PCS | Mod: CPTII,S$GLB,, | Performed by: PHYSICIAN ASSISTANT

## 2023-05-10 PROCEDURE — 99214 PR OFFICE/OUTPT VISIT, EST, LEVL IV, 30-39 MIN: ICD-10-PCS | Mod: S$GLB,,, | Performed by: PHYSICIAN ASSISTANT

## 2023-05-10 RX ORDER — ISOPROPYL ALCOHOL 70 ML/100ML
1 SWAB TOPICAL
Qty: 100 EACH | Refills: 1 | Status: SHIPPED | OUTPATIENT
Start: 2023-05-10

## 2023-05-10 RX ORDER — ESCITALOPRAM OXALATE 5 MG/1
5 TABLET ORAL DAILY
Qty: 90 TABLET | Refills: 1 | Status: SHIPPED | OUTPATIENT
Start: 2023-05-10 | End: 2023-11-14

## 2023-05-10 NOTE — PROGRESS NOTES
Subjective     Patient ID: Maria Alejandra De La Rosa is a 68 y.o. female.    Chief Complaint: Follow-up    HPI    Established pt of Caryn Ambrosio MD (new to me)    Here for 3 month f/u  Last seen by PCP Jan 2023    DM is improving, A1c now 8.8(down from 13%)  Continues to follow with Endo, started on Ozempic, glimepiride switched to combo (glipizide-metformin), waiting on arrival from mail order pharmacy  Glucose 300s this am.     Reports she has been more emotional, tearful, anxious and overwhelmed for the past several months. +insomni Hx of anxiety/depression, saw psychiatry many years ago, has tried prozac and valium in the past. She is interested in restarting meds and therapy    Past Medical History:   Diagnosis Date    Cataract     Depression     Diabetes mellitus type 2 in obese 02/01/2018    Diabetes mellitus, type 2     DJD (degenerative joint disease)     Dysmetabolic syndrome     Hyperglycemia     Hypertension     Lumbar stenosis     Sleep apnea      Social History     Tobacco Use    Smoking status: Never    Smokeless tobacco: Never   Substance Use Topics    Alcohol use: No     Review of patient's allergies indicates:   Allergen Reactions    Penicillin Hives    Penicillins      Other reaction(s): Rash    Vicodin  [hydrocodone-acetaminophen]      Other reaction(s): Unknown    Metformin Diarrhea     Diarrhea        Review of Systems   Constitutional:  Negative for chills, fever and unexpected weight change.   Eyes:  Negative for visual disturbance.   Respiratory:  Negative for cough, shortness of breath and wheezing.    Cardiovascular:  Negative for chest pain and leg swelling.   Gastrointestinal:  Negative for abdominal pain, nausea and vomiting.   Endocrine: Negative for polydipsia, polyphagia and polyuria.   Integumentary:  Negative for rash.   Neurological:  Negative for weakness, light-headedness and headaches.   Psychiatric/Behavioral:  Positive for dysphoric mood and sleep disturbance. Negative for  "suicidal ideas. The patient is nervous/anxious.         Objective  /70 (BP Location: Right arm, Patient Position: Sitting, BP Method: Large (Manual))   Pulse 65   Ht 5' 6" (1.676 m)   Wt 103.9 kg (229 lb 0.9 oz)   SpO2 97%   BMI 36.97 kg/m²       Physical Exam  Vitals reviewed.   Constitutional:       General: She is not in acute distress.     Appearance: She is well-developed.   HENT:      Head: Normocephalic and atraumatic.      Right Ear: Tympanic membrane, ear canal and external ear normal.      Left Ear: Tympanic membrane, ear canal and external ear normal.   Cardiovascular:      Rate and Rhythm: Normal rate and regular rhythm.      Heart sounds: No murmur heard.  Pulmonary:      Effort: Pulmonary effort is normal.      Breath sounds: Normal breath sounds. No wheezing or rales.   Abdominal:      General: Bowel sounds are normal.      Palpations: Abdomen is soft.      Tenderness: There is no abdominal tenderness.   Musculoskeletal:      Right lower leg: No edema.      Left lower leg: No edema.   Skin:     General: Skin is warm and dry.      Capillary Refill: Capillary refill takes less than 2 seconds.      Findings: No rash.   Neurological:      Mental Status: She is alert and oriented to person, place, and time.   Psychiatric:         Attention and Perception: Attention normal.         Mood and Affect: Mood is anxious. Affect is tearful.         Speech: Speech normal.         Behavior: Behavior normal. Behavior is cooperative.         Thought Content: Thought content does not include homicidal or suicidal ideation.          Assessment and Plan     Problem List Items Addressed This Visit          Cardiac/Vascular    Hypertension associated with diabetes    Hyperlipidemia associated with type 2 diabetes mellitus       Endocrine    Diabetes mellitus type 2 in obese - Primary (Chronic)     Other Visit Diagnoses       Adjustment reaction with anxiety and depression        Relevant Medications    " EScitalopram oxalate (LEXAPRO) 5 MG Tab    Other Relevant Orders    Ambulatory referral/consult to Primary Care Behavioral Health (Non-Opioids)            Maria Alejandra was seen today for follow-up.    Diagnoses and all orders for this visit:    Diabetes mellitus type 2 in obese  Improving  Recent med changes with Endo  May take glimepiride until she receives new med (Metaglip)  -     alcohol swabs (BD ALCOHOL SWABS) PadM; Apply 1 each topically as needed (blood sugar testing).    Hypertension associated with diabetes  Well controlled on current meds, continue     Hyperlipidemia associated with type 2 diabetes mellitus  Stable on statin therapy    Adjustment reaction with anxiety and depression  Trial of lexapro and BHI therapy  -     EScitalopram oxalate (LEXAPRO) 5 MG Tab; Take 1 tablet (5 mg total) by mouth once daily.  -     Ambulatory referral/consult to Primary Care Behavioral Health (Non-Opioids); Future      RTC in 2 months for PCP f/u or sooner if needed.     Jeannie Rogers PA-C

## 2023-06-07 DIAGNOSIS — E11.9 TYPE 2 DIABETES MELLITUS WITHOUT COMPLICATION: ICD-10-CM

## 2023-06-13 ENCOUNTER — PATIENT MESSAGE (OUTPATIENT)
Dept: ADMINISTRATIVE | Facility: HOSPITAL | Age: 68
End: 2023-06-13
Payer: MEDICARE

## 2023-07-12 ENCOUNTER — PES CALL (OUTPATIENT)
Dept: ADMINISTRATIVE | Facility: CLINIC | Age: 68
End: 2023-07-12
Payer: MEDICARE

## 2023-07-20 NOTE — H&P
[facility] - Emergency Dept  [facility] Medicine  History & Physical    Patient Name: Maria Alejandra De La Rosa  MRN: [id number]  Patient Class: OP- Observation  Admission Date: 1/6/2023  Attending Physician: Lexi Evans MD [facility]  Admitting Physician: Joshua Tinajero MD  Primary Care Provider: Caryn Ambrosio MD         Patient information was obtained from patient, relative(s), past medical records and ER records.     Subjective:     Principal Problem:Pneumonia due to infectious organism    Chief Complaint:   Chief Complaint   Patient presents with    Hyperglycemia     432 gl, flu like symptoms        HPI: 67-year-old woman with a history of hypertension, type 2 diabetes, morbid obesity who presents to the emergency department at the [facility] to urgent care for concerns of uncontrolled hyperglycemia and possible right lower lobe pneumonia.  She was seen at [facility] urgent care earlier today she has been reporting over the last 9-10 days symptoms sore throat URI type with cough productive of sputum, fatigue poor appetite and intermittent nausea.  She has a known sick contact her son tested positive for influenza about 9 days ago.    Her glucose earlier today in clinic was greater than 400 in the last week she has not been monitoring her point of care glucoses, she does not take insulin.    ED workup is notable for mild leukocytosis hypokalemia, ISAI is present, hyperglycemic workup is negative for serum ketones and patient has a non acidotic pH, serum Oz and are pending.    She does feel better since receiving antitussives and anti nausea medicine in the emergency department she tested positive for influenza a and has received a dose of oseltamivir.  She will be admitted for observation for concerns of pneumonia.    Review of systems she denies any chest pain palpitations lower extremity swelling, no sleep disturbances, she does have reduced appetite, polyuria, polydipsia.    Meds reviewed & reconciled at  bedside    ED treatment: Ceftriaxone, Azithromycin, Zofran, Dextromethorphan Guaif, Oseltamivir, 1L of LR      Past Medical History:   Diagnosis Date    Bipolar 1 disorder     Cataract     Diabetes mellitus type 2 in obese 2018    Diabetes mellitus, type 2     DJD (degenerative joint disease)     Dysmetabolic syndrome     Hyperglycemia     Hypertension     Lumbar stenosis     Sleep apnea        Past Surgical History:   Procedure Laterality Date    BACK SURGERY       SECTION      CHOLECYSTECTOMY         Review of patient's allergies indicates:   Allergen Reactions    Penicillin Hives    Penicillins      Other reaction(s): Rash    Vicodin  [hydrocodone-acetaminophen]      Other reaction(s): Unknown    Metformin Diarrhea     Diarrhea        No current facility-administered medications on file prior to encounter.     Current Outpatient Medications on File Prior to Encounter   Medication Sig    alcohol swabs (BD ALCOHOL SWABS) PadM Apply 1 each topically as needed (blood sugar testing).    atorvastatin (LIPITOR) 10 MG tablet Take 1 tablet (10 mg total) by mouth once daily.    blood sugar diagnostic (ONETOUCH VERIO TEST STRIPS) Strp Use 4 times a day. DX code  E 11.42    carvediloL (COREG) 12.5 MG tablet Take 1 tablet (12.5 mg total) by mouth 2 (two) times daily with meals.    cyanocobalamin 500 MCG tablet Take 500 mcg by mouth.    glimepiride (AMARYL) 4 MG tablet Take 1 tablet (4 mg total) by mouth daily with breakfast.    hydroCHLOROthiazide (HYDRODIURIL) 25 MG tablet Take 1 tablet (25 mg total) by mouth once daily.    losartan (COZAAR) 100 MG tablet Take 1 tablet (100 mg total) by mouth once daily.    multivitamin capsule Take 1 capsule by mouth once daily.    ONETOUCH DELICA LANCETS 33 gauge Misc Inject 1 lancet into the skin 2 (two) times daily.    SITagliptin (JANUVIA) 50 MG Tab Take 1 tablet (50 mg total) by mouth once daily.    vitamin D 1000 units Tab Take 1,000 Units by  mouth once daily.     Family History       Problem Relation (Age of Onset)    COPD Mother    Cancer Mother, Father    Cataracts Mother    Diabetes Maternal Grandmother    No Known Problems Sister, Brother, Maternal Aunt, Maternal Uncle, Paternal Aunt, Paternal Uncle, Maternal Grandfather, Paternal Grandmother, Paternal Grandfather          Tobacco Use    Smoking status: Never    Smokeless tobacco: Never   Substance and Sexual Activity    Alcohol use: No    Drug use: Not on file    Sexual activity: Not on file     Review of Systems   Constitutional:  Positive for activity change, appetite change and fatigue. Negative for chills and fever.   HENT:  Positive for congestion and sore throat. Negative for trouble swallowing.    Eyes:  Negative for visual disturbance.   Respiratory:  Positive for cough. Negative for wheezing.    Cardiovascular:  Negative for chest pain, palpitations and leg swelling.   Gastrointestinal:  Positive for nausea. Negative for abdominal distention, abdominal pain and diarrhea.   Endocrine: Positive for polydipsia and polyuria.   Genitourinary:  Negative for dysuria.   Skin:  Negative for rash.   Psychiatric/Behavioral:  Negative for confusion.    Objective:     Vital Signs (Most Recent):  Temp: 98.7 °F (37.1 °C) (01/06/23 1453)  Pulse: 65 (01/06/23 1902)  Resp: 13 (01/06/23 1658)  BP: (!) 142/67 (01/06/23 1902)  SpO2: 95 % (01/06/23 1859)   Vital Signs (24h Range):  Temp:  [98.7 °F (37.1 °C)] 98.7 °F (37.1 °C)  Pulse:  [65-79] 65  Resp:  [13-28] 13  SpO2:  [92 %-95 %] 95 %  BP: (121-183)/(66-81) 142/67     Weight: 112.9 kg (249 lb)  Body mass index is 40.19 kg/m².    Physical Exam  Vitals and nursing note reviewed.   Constitutional:       General: She is not in acute distress.     Appearance: She is obese. She is ill-appearing.   HENT:      Head: Normocephalic and atraumatic.      Mouth/Throat:      Pharynx: Posterior oropharyngeal erythema present. No pharyngeal swelling or oropharyngeal  exudate.   Cardiovascular:      Rate and Rhythm: Normal rate and regular rhythm.      Pulses: Normal pulses.   Pulmonary:      Effort: Pulmonary effort is normal.      Comments: No accessory muscle use, O2 sat decreases to 92% speaking through right lung fields some inspiratory crackles and rhonchi noted left lower lobe inspiratory crackles no wheezes noted  Abdominal:      General: Bowel sounds are normal. There is no distension.      Palpations: Abdomen is soft.      Tenderness: There is no abdominal tenderness.   Musculoskeletal:      Right lower leg: No edema.      Left lower leg: No edema.   Skin:     General: Skin is warm and dry.   Neurological:      General: No focal deficit present.      Mental Status: She is alert and oriented to person, place, and time.   Psychiatric:         Mood and Affect: Mood normal.         Behavior: Behavior normal.           Significant Labs: All pertinent labs within the past 24 hours have been reviewed.  ABGs:   Recent Labs   Lab 01/06/23  1545   PH 7.497*   PCO2 43.4   HCO3 33.6*   POCSATURATED 64*   BE 10   PO2 30*     CBC:   Recent Labs   Lab 01/06/23  1527   WBC 12.98*   HGB 13.8   HCT 41.2        CMP:   Recent Labs   Lab 01/06/23  1527   *   K 3.1*   CL 96   CO2 26   *   BUN 20   CREATININE 1.5*   CALCIUM 9.7   PROT 6.9   ALBUMIN 3.0*   BILITOT 1.6*   ALKPHOS 129   AST 19   ALT 19   ANIONGAP 13     Cardiac Markers: No results for input(s): CKMB, MYOGLOBIN, BNP, TROPISTAT in the last 48 hours.  Coagulation: No results for input(s): PT, INR, APTT in the last 48 hours.  Lactic Acid: No results for input(s): LACTATE in the last 48 hours.  Magnesium: No results for input(s): MG in the last 48 hours.  Troponin: No results for input(s): TROPONINI, TROPONINIHS in the last 48 hours.  Urine Studies:   Recent Labs   Lab 01/06/23  1700   COLORU Yellow   APPEARANCEUA Clear   PHUR 5.0   SPECGRAV 1.015   PROTEINUA Negative   GLUCUA 4+*   KETONESU Negative   BILIRUBINUA  Negative   OCCULTUA Negative   NITRITE Negative   LEUKOCYTESUR Negative   RBCUA 1   WBCUA 2   BACTERIA Rare   SQUAMEPITHEL 2       Significant Imaging: I have reviewed all pertinent imaging results/findings within the past 24 hours.  XR CHEST 1 VIEW     CLINICAL HISTORY:  Cough, unspecified     TECHNIQUE:  Single frontal view of the chest was performed.     COMPARISON:  01/06/2023     FINDINGS:  The cardiomediastinal silhouette is not enlarged.  There is calcification of the aorta..  There is no pleural effusion.  The trachea is midline.  The lungs are symmetrically expanded bilaterally with mildly coarse interstitial attenuation accentuated by habitus and shallow inspiratory effort..  No large focal consolidation seen.  There is no pneumothorax.  The osseous structures are remarkable for degenerative changes..     Impression:     1. No acute cardiopulmonary process allowing for shallow inspiratory effort.        Electronically signed by: Jed Field MD  Date:                                            01/06/2023  Time:                                           17:30    Assessment/Plan:     * Pneumonia due to infectious organism  -patient with 7 days plus of respiratory tract symptoms, positive for influenza, report outpatient CXR with concern for RLL pneumonia, repeat cxr in our system w/o defined consolidation.   -Patient with leukocytosis, tachypneia, no hypoxic respiratory failure, will be admitted for observation.  Given duration since symptom onset and worsening symptoms, she has received empiric antibiotics for CAP coverage, can continued Ceftriaxone/Azithromycin for 5 day course  -Continue Renal dosed Oseltamivir 30mg po bid x 8 doses, IF renal function improves rapidly - adjust dosing accordingly. (crcl <50 now)   -Provide symptom treatment PRN - anti-tussives, acetaminophen.       Influenza A  As per pneumonia   -continue oseltamivir for 4 more days, renal dosed  -patient with known sick contact, son  tested positive for flu      ISAI (acute kidney injury)  Patient with acute kidney injury likely due to IVVD/dehydration and pre-renal azotemia ISAI is currently worsening. Labs reviewed- Renal function/electrolytes with Estimated Creatinine Clearance: 45.1 mL/min (A) (based on SCr of 1.5 mg/dL (H)). according to latest data. Monitor urine output and serial BMP and adjust therapy as needed. Avoid nephrotoxins and renally dose meds for GFR listed above.   -  -s/p 1L of IV fluids in ED, trend chemistry panel   -suspect Influenza and potential polyuria from uncontrolled hyperglycemia contributing to ISAI.    Diabetes mellitus type 2 in obese  Patient's FSGs are uncontrolled due to hyperglycemia on current medication regimen.  Last A1c reviewed-   Lab Results   Component Value Date    HGBA1C 5.9 (H) 05/24/2022     Most recent fingerstick glucose reviewed-   Recent Labs   Lab 01/06/23  1459 01/06/23  1750   POCTGLUCOSE 367* 389*     Current correctional scale  Low  Maintain anti-hyperglycemic dose as follows-   Antihyperglycemics (From admission, onward)    Start     Stop Route Frequency Ordered    01/06/23 2100  insulin detemir U-100 pen 12 Units         -- SubQ Nightly 01/06/23 1849    01/06/23 1943  insulin aspart U-100 pen 0-5 Units         -- SubQ Before meals & nightly PRN 01/06/23 1849        Hold Oral hypoglycemics while patient is in the hospital.    Hypertension associated with diabetes  HOLD Losartan and HCTZ due to ISAI and with admit for infection  -Will consider restarting Carvedilol 1st if patient with rising blood pressures.       VTE Risk Mitigation (From admission, onward)         Ordered     enoxaparin injection 40 mg  Every 12 hours         01/06/23 1952     IP VTE HIGH RISK PATIENT  Once         01/06/23 1952                   Joshua Tinajero MD  Department of Hospital Medicine   [facility] - Emergency Dept   Price (Do Not Change): 0.00 Instructions: This plan will send the code FBSE to the PM system.  DO NOT or CHANGE the price. Detail Level: Simple

## 2023-07-31 ENCOUNTER — LAB VISIT (OUTPATIENT)
Dept: LAB | Facility: HOSPITAL | Age: 68
End: 2023-07-31
Attending: HOSPITALIST
Payer: MEDICARE

## 2023-07-31 DIAGNOSIS — E11.65 UNCONTROLLED TYPE 2 DIABETES MELLITUS WITH HYPERGLYCEMIA, WITHOUT LONG-TERM CURRENT USE OF INSULIN: ICD-10-CM

## 2023-07-31 LAB
ANION GAP SERPL CALC-SCNC: 10 MMOL/L (ref 8–16)
BUN SERPL-MCNC: 14 MG/DL (ref 8–23)
C PEPTIDE SERPL-MCNC: 2.86 NG/ML (ref 0.78–5.19)
CALCIUM SERPL-MCNC: 9.6 MG/DL (ref 8.7–10.5)
CHLORIDE SERPL-SCNC: 106 MMOL/L (ref 95–110)
CO2 SERPL-SCNC: 28 MMOL/L (ref 23–29)
CREAT SERPL-MCNC: 0.8 MG/DL (ref 0.5–1.4)
EST. GFR  (NO RACE VARIABLE): >60 ML/MIN/1.73 M^2
ESTIMATED AVG GLUCOSE: 157 MG/DL (ref 68–131)
GLUCOSE SERPL-MCNC: 121 MG/DL (ref 70–110)
HBA1C MFR BLD: 7.1 % (ref 4–5.6)
POTASSIUM SERPL-SCNC: 4 MMOL/L (ref 3.5–5.1)
SODIUM SERPL-SCNC: 144 MMOL/L (ref 136–145)

## 2023-07-31 PROCEDURE — 83036 HEMOGLOBIN GLYCOSYLATED A1C: CPT | Performed by: HOSPITALIST

## 2023-07-31 PROCEDURE — 80048 BASIC METABOLIC PNL TOTAL CA: CPT | Performed by: HOSPITALIST

## 2023-07-31 PROCEDURE — 84681 ASSAY OF C-PEPTIDE: CPT | Performed by: HOSPITALIST

## 2023-07-31 PROCEDURE — 36415 COLL VENOUS BLD VENIPUNCTURE: CPT | Performed by: HOSPITALIST

## 2023-08-05 ENCOUNTER — HOSPITAL ENCOUNTER (EMERGENCY)
Facility: HOSPITAL | Age: 68
Discharge: HOME | End: 2023-08-05
Attending: EMERGENCY MEDICINE
Payer: MEDICARE

## 2023-08-05 VITALS
SYSTOLIC BLOOD PRESSURE: 147 MMHG | HEIGHT: 66 IN | HEART RATE: 71 BPM | DIASTOLIC BLOOD PRESSURE: 70 MMHG | TEMPERATURE: 98 F | BODY MASS INDEX: 34.87 KG/M2 | OXYGEN SATURATION: 97 % | WEIGHT: 217 LBS | RESPIRATION RATE: 16 BRPM

## 2023-08-05 DIAGNOSIS — S46.912A SHOULDER STRAIN, LEFT, INITIAL ENCOUNTER: Primary | ICD-10-CM

## 2023-08-05 DIAGNOSIS — M79.602 LEFT ARM PAIN: ICD-10-CM

## 2023-08-05 DIAGNOSIS — S09.90XA CLOSED HEAD INJURY, INITIAL ENCOUNTER: ICD-10-CM

## 2023-08-05 DIAGNOSIS — S40.012A CONTUSION OF LEFT SHOULDER, INITIAL ENCOUNTER: ICD-10-CM

## 2023-08-05 PROCEDURE — 96372 THER/PROPH/DIAG INJ SC/IM: CPT | Performed by: EMERGENCY MEDICINE

## 2023-08-05 PROCEDURE — 99285 EMERGENCY DEPT VISIT HI MDM: CPT | Mod: 25

## 2023-08-05 PROCEDURE — 63600175 PHARM REV CODE 636 W HCPCS: Performed by: EMERGENCY MEDICINE

## 2023-08-05 RX ORDER — KETOROLAC TROMETHAMINE 30 MG/ML
10 INJECTION, SOLUTION INTRAMUSCULAR; INTRAVENOUS
Status: COMPLETED | OUTPATIENT
Start: 2023-08-05 | End: 2023-08-05

## 2023-08-05 RX ADMIN — KETOROLAC TROMETHAMINE 10 MG: 30 INJECTION, SOLUTION INTRAMUSCULAR; INTRAVENOUS at 01:08

## 2023-08-05 NOTE — ED PROVIDER NOTES
Encounter Date: 8/5/2023       History     Chief Complaint   Patient presents with    Shoulder Injury     Tripped yest head hit L shoulder and head , no loc and not on blood thinners     This is a 68-year-old female who presents to the emergency department after a fall that occurred around 10:00 a.m. yesterday.  She states that she was getting up out of her chair swing and tripped on a part of the swing and fell into the window sill and window.  The window did not break but she fell with a significant amount of force.  The brunt of the injury was to the top of her head and into the left shoulder.  Since that time her pain at the left shoulder has been the worst.  It worsens with any range of motion and she has difficulty ranging much over 90° of lateral abduction.  She has no numbness or tingling down distal into her left upper extremity.  She also has had a mild headache and some nausea since.  She is had no confusion per her .  She has some mild tenderness to palpation in the left side of her neck as well.  She did not lose consciousness at the time.  She is not on any blood thinners.  She has no complaints to her chest or abdomen.  No complaints to her back.  No complaints to her extremities otherwise other than her left shoulder.  She does have a history of diabetes but states that her blood glucose is usually in the low 100s.    External sources of information today include her  who is accompanying her as well as records external to today's visit.    Review of patient's allergies indicates:   Allergen Reactions    Penicillin Hives    Penicillins      Other reaction(s): Rash    Vicodin  [hydrocodone-acetaminophen]      Other reaction(s): Unknown    Metformin Diarrhea     Diarrhea      Past Medical History:   Diagnosis Date    Cataract     Depression     Diabetes mellitus type 2 in obese 02/01/2018    Diabetes mellitus, type 2     DJD (degenerative joint disease)     Dysmetabolic syndrome      Hyperglycemia     Hypertension     Lumbar stenosis     Sleep apnea      Past Surgical History:   Procedure Laterality Date    BACK SURGERY       SECTION      CHOLECYSTECTOMY       Family History   Problem Relation Age of Onset    Cancer Mother         lung cancer    Cataracts Mother     COPD Mother     Cancer Father         lung cancer     Diabetes Maternal Grandmother     No Known Problems Sister     No Known Problems Brother     No Known Problems Maternal Aunt     No Known Problems Maternal Uncle     No Known Problems Paternal Aunt     No Known Problems Paternal Uncle     No Known Problems Maternal Grandfather     No Known Problems Paternal Grandmother     No Known Problems Paternal Grandfather     Amblyopia Neg Hx     Blindness Neg Hx     Glaucoma Neg Hx     Hypertension Neg Hx     Macular degeneration Neg Hx     Retinal detachment Neg Hx     Strabismus Neg Hx     Stroke Neg Hx     Thyroid disease Neg Hx      Social History     Tobacco Use    Smoking status: Never    Smokeless tobacco: Never   Substance Use Topics    Alcohol use: No     Review of Systems   Constitutional:  Negative for chills and fever.   HENT:  Negative for sore throat.    Eyes:  Negative for visual disturbance.   Respiratory:  Negative for cough and shortness of breath.    Cardiovascular:  Negative for chest pain.   Gastrointestinal:  Positive for nausea. Negative for abdominal pain, diarrhea and vomiting.   Genitourinary:  Negative for dysuria.   Musculoskeletal:  Positive for arthralgias and neck pain. Negative for back pain.        Left-sided neck pain.   Skin:  Negative for rash.   Neurological:  Positive for headaches. Negative for numbness.   Hematological:  Does not bruise/bleed easily.   Psychiatric/Behavioral:  Negative for confusion.        Physical Exam     Initial Vitals [23 1112]   BP Pulse Resp Temp SpO2   (!) 147/70 71 16 98 °F (36.7 °C) 97 %      MAP       --         Physical  Exam    Consititutional: Pt is awake, alert, and oriented x 4. Does not appear to be in any krystin distress other than being hesitant to move her left shoulder.  HEENT:  No notable trauma or hematomas to the scalp; PERRL; EOMI; midface stable; nares patent; op clear; mmm without lesions.  Neck:  Trachea is midline.  There is no tenderness to palpation to the midline C-spine but there is tenderness just off the midline on the left side throughout the left paraspinal area.  CV: Normal rate; regular rhythm; no mrg. Heart sounds normal. No peripheral edema. 2+ radials bilateral and symmetric.  Respiratory: CTA bilaterally with no focal rales, ronchi, or wheezes.  GI: Abdomen soft, NTND. No rebound. No guarding. BS normal.  MSK:  Focusing on the left shoulder, there is no overt deformity or asymmetry between the left and right shoulder however the patient has tenderness to palpation throughout that left shoulder and with range particularly with lateral abduction past 90° she has difficulty with active range.  She has increased pain at that time.  Distally her median, radial, and ulnar nerves are intact both motor and sensation.  She is no tenderness to palpation to her midline T or L-spine.  I have range her right upper extremity or bilateral lower extremities and there is no tenderness with range or palpation throughout the remainder of her extremity exam.  Neuro:  As above.  Skin: No skin lesions or rash.  No ecchymoses are noted.  ED Course   Procedures  Labs Reviewed - No data to display       Imaging Results    None          Medications   ketorolac injection 9.999 mg (has no administration in time range)     Medical Decision Making:   History:   I obtained history from: someone other than patient.       <> Summary of History: Patient's  as above.  Old Medical Records: I decided to obtain old medical records.  Old Records Summarized: records from clinic visits.       <> Summary of Records: As above.  Initial  Assessment:   This is a 68-year-old female who presents to the emergency department after a fall where she took the brunt of the injury to the top of her head into her left shoulder.  Differential Diagnosis:   We needed to rule out acute intracranial lesion or bleed post trauma as well as acute cervical spine fracture or malalignment given her current symptoms and her exam.  We also needed to rule out any acute fracture or malalignment to the left shoulder.  Thus CT of the head and C-spine as well as plain films of the left shoulder were ordered.  Patient's pain was treated with IM Toradol.  Independently Interpreted Test(s):   I have ordered and independently interpreted X-rays - see prior notes.  Clinical Tests:   Radiological Study: Ordered and Reviewed  ED Management:  The patient had IM toradol which improved her pain significantly. Plain films of the shoulder and humerus including a y view as well as CT of the head and c spine, independently reviewed and interpreted by me and interpreted by radiology, reveal no acute intracranial lesion or bleed, no cervical spine fracture or malalignment, and no fracture or dislocation of the left shoulder/humerus. The patient can be discharged at this time. I have placed an ambulatory referral to sports medicine and encouraged her to follow up with them in appox one week if she is still having difficulty with the left shoulder. We have gone over supportive care measures for the left shoulder. Strict return precautions have also been given.  ED Diagnosis:  1. Acute left shoulder strain and contusion.   2. Acute cervical strain.   3. Above diagnoses complicated by known diabetes, htn, and hypercholesterolemia.                           Clinical Impression:   Final diagnoses:  [M79.602] Left arm pain               Scarlet Samson MD  08/17/23 0995

## 2023-08-05 NOTE — DISCHARGE INSTRUCTIONS
Your films and CT imaging were reassuring today.  You do have a left shoulder strain and contusion there.  As we have discussed, you can alternate Tylenol and ibuprofen as directed for pain.  Return to the emergency department if you have any numbness or tingling in your left hand or difficulty using your left hand, confusion, worsening headache, visual changes, or any other concerns.  Follow-up with your primary doctor within the next 3 days and with sports Medicine if your left shoulder continues to hurt.  Our goal in the emergency department is to always give you outstanding care and exceptional service. You may receive a survey by mail or e-mail in the next week regarding your experience in our ED. We would greatly appreciate your completing and returning the survey. Your feedback provides us with a way to recognize our staff who give very good care and it helps us learn how to improve when your experience was below our aspiration of excellence.

## 2023-08-05 NOTE — ED TRIAGE NOTES
Pt to ED via personal transport after mechanical trip and fall yesterday. Pt fell forwards into window, hit head and L shoulder. Denies LOC, - blood thinner use. Denies HA, N/V, vision change. Only c/o L shoulder pain. No deformity noted.

## 2023-08-05 NOTE — Clinical Note
"Maria Alejandra"Santy De La Rosa was seen and treated in our emergency department on 8/5/2023.  She may return to work on 08/07/2023.       If you have any questions or concerns, please don't hesitate to call.      Scarlet Samson MD"

## 2023-08-07 ENCOUNTER — OFFICE VISIT (OUTPATIENT)
Dept: ENDOCRINOLOGY | Facility: CLINIC | Age: 68
End: 2023-08-07
Payer: MEDICARE

## 2023-08-07 VITALS
HEART RATE: 73 BPM | BODY MASS INDEX: 35.83 KG/M2 | SYSTOLIC BLOOD PRESSURE: 120 MMHG | DIASTOLIC BLOOD PRESSURE: 66 MMHG | WEIGHT: 222 LBS | TEMPERATURE: 98 F

## 2023-08-07 DIAGNOSIS — E11.9 CONTROLLED TYPE 2 DIABETES MELLITUS WITHOUT COMPLICATION, WITHOUT LONG-TERM CURRENT USE OF INSULIN: Primary | ICD-10-CM

## 2023-08-07 DIAGNOSIS — E66.01 CLASS 2 SEVERE OBESITY DUE TO EXCESS CALORIES WITH SERIOUS COMORBIDITY AND BODY MASS INDEX (BMI) OF 35.0 TO 35.9 IN ADULT: ICD-10-CM

## 2023-08-07 DIAGNOSIS — E78.00 HYPERCHOLESTEROLEMIA: ICD-10-CM

## 2023-08-07 PROCEDURE — 1160F PR REVIEW ALL MEDS BY PRESCRIBER/CLIN PHARMACIST DOCUMENTED: ICD-10-PCS | Mod: CPTII,S$GLB,, | Performed by: HOSPITALIST

## 2023-08-07 PROCEDURE — 1101F PR PT FALLS ASSESS DOC 0-1 FALLS W/OUT INJ PAST YR: ICD-10-PCS | Mod: CPTII,S$GLB,, | Performed by: HOSPITALIST

## 2023-08-07 PROCEDURE — 3078F DIAST BP <80 MM HG: CPT | Mod: CPTII,S$GLB,, | Performed by: HOSPITALIST

## 2023-08-07 PROCEDURE — 99999 PR PBB SHADOW E&M-EST. PATIENT-LVL IV: ICD-10-PCS | Mod: PBBFAC,,, | Performed by: HOSPITALIST

## 2023-08-07 PROCEDURE — 3072F PR LOW RISK FOR RETINOPATHY: ICD-10-PCS | Mod: CPTII,S$GLB,, | Performed by: HOSPITALIST

## 2023-08-07 PROCEDURE — 1159F MED LIST DOCD IN RCRD: CPT | Mod: CPTII,S$GLB,, | Performed by: HOSPITALIST

## 2023-08-07 PROCEDURE — 1101F PT FALLS ASSESS-DOCD LE1/YR: CPT | Mod: CPTII,S$GLB,, | Performed by: HOSPITALIST

## 2023-08-07 PROCEDURE — 3051F PR MOST RECENT HEMOGLOBIN A1C LEVEL 7.0 - < 8.0%: ICD-10-PCS | Mod: CPTII,S$GLB,, | Performed by: HOSPITALIST

## 2023-08-07 PROCEDURE — 3288F FALL RISK ASSESSMENT DOCD: CPT | Mod: CPTII,S$GLB,, | Performed by: HOSPITALIST

## 2023-08-07 PROCEDURE — 99214 OFFICE O/P EST MOD 30 MIN: CPT | Mod: S$GLB,,, | Performed by: HOSPITALIST

## 2023-08-07 PROCEDURE — 99214 PR OFFICE/OUTPT VISIT, EST, LEVL IV, 30-39 MIN: ICD-10-PCS | Mod: S$GLB,,, | Performed by: HOSPITALIST

## 2023-08-07 PROCEDURE — 3066F NEPHROPATHY DOC TX: CPT | Mod: CPTII,S$GLB,, | Performed by: HOSPITALIST

## 2023-08-07 PROCEDURE — 3008F BODY MASS INDEX DOCD: CPT | Mod: CPTII,S$GLB,, | Performed by: HOSPITALIST

## 2023-08-07 PROCEDURE — 1159F PR MEDICATION LIST DOCUMENTED IN MEDICAL RECORD: ICD-10-PCS | Mod: CPTII,S$GLB,, | Performed by: HOSPITALIST

## 2023-08-07 PROCEDURE — 3074F SYST BP LT 130 MM HG: CPT | Mod: CPTII,S$GLB,, | Performed by: HOSPITALIST

## 2023-08-07 PROCEDURE — 3008F PR BODY MASS INDEX (BMI) DOCUMENTED: ICD-10-PCS | Mod: CPTII,S$GLB,, | Performed by: HOSPITALIST

## 2023-08-07 PROCEDURE — 4010F ACE/ARB THERAPY RXD/TAKEN: CPT | Mod: CPTII,S$GLB,, | Performed by: HOSPITALIST

## 2023-08-07 PROCEDURE — 3051F HG A1C>EQUAL 7.0%<8.0%: CPT | Mod: CPTII,S$GLB,, | Performed by: HOSPITALIST

## 2023-08-07 PROCEDURE — 3072F LOW RISK FOR RETINOPATHY: CPT | Mod: CPTII,S$GLB,, | Performed by: HOSPITALIST

## 2023-08-07 PROCEDURE — 3061F NEG MICROALBUMINURIA REV: CPT | Mod: CPTII,S$GLB,, | Performed by: HOSPITALIST

## 2023-08-07 PROCEDURE — 3066F PR DOCUMENTATION OF TREATMENT FOR NEPHROPATHY: ICD-10-PCS | Mod: CPTII,S$GLB,, | Performed by: HOSPITALIST

## 2023-08-07 PROCEDURE — 4010F PR ACE/ARB THEARPY RXD/TAKEN: ICD-10-PCS | Mod: CPTII,S$GLB,, | Performed by: HOSPITALIST

## 2023-08-07 PROCEDURE — 3074F PR MOST RECENT SYSTOLIC BLOOD PRESSURE < 130 MM HG: ICD-10-PCS | Mod: CPTII,S$GLB,, | Performed by: HOSPITALIST

## 2023-08-07 PROCEDURE — 99999 PR PBB SHADOW E&M-EST. PATIENT-LVL IV: CPT | Mod: PBBFAC,,, | Performed by: HOSPITALIST

## 2023-08-07 PROCEDURE — 1160F RVW MEDS BY RX/DR IN RCRD: CPT | Mod: CPTII,S$GLB,, | Performed by: HOSPITALIST

## 2023-08-07 PROCEDURE — 3288F PR FALLS RISK ASSESSMENT DOCUMENTED: ICD-10-PCS | Mod: CPTII,S$GLB,, | Performed by: HOSPITALIST

## 2023-08-07 PROCEDURE — 3078F PR MOST RECENT DIASTOLIC BLOOD PRESSURE < 80 MM HG: ICD-10-PCS | Mod: CPTII,S$GLB,, | Performed by: HOSPITALIST

## 2023-08-07 PROCEDURE — 3061F PR NEG MICROALBUMINURIA RESULT DOCUMENTED/REVIEW: ICD-10-PCS | Mod: CPTII,S$GLB,, | Performed by: HOSPITALIST

## 2023-08-07 NOTE — ASSESSMENT & PLAN NOTE
- Body mass index is 35.83 kg/m².  - dietary discussion as above  - continue to monitor weight  - continue Ozempic

## 2023-08-07 NOTE — PROGRESS NOTES
Subjective:      Patient ID: Maria Alejandra De La Rosa is a 68 y.o. female presented to [facility] on 8/7/2023.  Chief Complaint:  Diabetes    History of Present Illness: Maria Alejandra De La Rosa is a 68 y.o. female here for  type 2 diabetes  Other significant past medical history:  Obesity, hypertension    Diabetes mellitus Type 2  - Diagnosed w/ DM: in 2014  - Diabetes Education: No  - Patient with recent hospital admission 1/6/2023 to 1/8/2023 for uncontrolled hyperglycemia and possible right lower lobe pneumonia.  - Her glucose earlier in clinic was greater than 400 in the last week she has not been monitoring her glucose, she does not take insulin. ED workup is notable for mild leukocytosis hypokalemia, ISAI is present, hyperglycemic workup is negative for serum ketones and patient has a non acidotic pH.  A1c found to be 13.0%.    Interval history:  Patient is here for diabetes management A1c 7.1%.  Patient has been making dietary changes at home. A1c improvement  Glucose log review no hypoglycemia  Fell and hurt her shoulder  Weight loss 222> previous weight 231    Current reported meds:    Ozempic 0.5 mg once a week injection >> cost is expensive   glipizide/metformin  5/500 mg 1 tablet twice a day  Previous meds tried:              Trulicity >> too expensive, last used 6/2022              Glimepiride 4mg daily   Januvia 50mg daily  Home glucose checks: checks daily , Logs reviewed  - most recent severe hypoglycemia  - no hypoglycemia noted  113  136  101  112  83  169  155  113  163  191    Diet/Exercise:   - Eating 3x meals per day, eating mostly preprepared TV dinner              - Drink: water, crystral light  Sleeping:   - Any difficulty falling asleep, staying asleep, nighttime coughing, or partner complaints of snoring?  No  - What time do you usually go to bed? What time do you usually wake up? 10-6  Weight trend: stable  Diabetes Related Hospitalization:  No  Hx of pancreatitis: No, denies  Family  "history of diabetes: Yes  Occupation:  Retired    Eye exam current (within one year): yes, DR: no, unknown  Reports cuts or ulcers on feet:   Denies, has podiatrist  Statin: Taking, ACE/ARB: Taking    Diabetes lab work  Lab Results   Component Value Date    HGBA1C 7.1 (H) 07/31/2023    HGBA1C 8.8 (H) 04/28/2023    HGBA1C 13.0 (H) 01/07/2023    HGBA1C 5.9 (H) 05/24/2022     Lab Results   Component Value Date    CPEPTIDE 2.86 07/31/2023      No results found for: "FRUCTOSAMINE"  Lab Results   Component Value Date    MICALBCREAT Unable to calculate 04/28/2023     No results found for: "[id number]"    Diabetes Management Status: Reviewed this office visit  Screening or Prevention Patient's value Goal Complete/Controlled?   Lipid profile : 05/24/2022 Annually Yes     Dilated retinal exam : 10/11/2022 Annually Yes     Foot exam   : 01/19/2023 Annually Yes        Reviewed past surgical, medical, family, social history and updated as appropriate.  Review of Systems: see HPI above  Objective:   /66   Pulse 73   Temp 98.2 °F (36.8 °C) (Oral)   Wt 100.7 kg (222 lb)   BMI 35.83 kg/m²   Body mass index is 35.83 kg/m².  Vital signs reviewed    Physical Exam  Vitals and nursing note reviewed.   Constitutional:       Appearance: Normal appearance. She is well-developed. She is obese. She is not ill-appearing.   Neck:      Thyroid: No thyromegaly.   Pulmonary:      Effort: Pulmonary effort is normal. No respiratory distress.   Musculoskeletal:         General: Normal range of motion.      Cervical back: Normal range of motion.   Neurological:      General: No focal deficit present.      Mental Status: She is alert. Mental status is at baseline.   Psychiatric:         Mood and Affect: Mood normal.         Behavior: Behavior normal.       Lab Reviewed:  See results in subjective  Lab Results   Component Value Date    HGBA1C 7.1 (H) 07/31/2023     Lab Results   Component Value Date    CHOL 99 (L) 05/24/2022    HDL 49 " 05/24/2022    LDLCALC 39.0 (L) 05/24/2022    TRIG 55 05/24/2022    CHOLHDL 49.5 05/24/2022     Lab Results   Component Value Date     07/31/2023    K 4.0 07/31/2023     07/31/2023    CO2 28 07/31/2023     (H) 07/31/2023    BUN 14 07/31/2023    CREATININE 0.8 07/31/2023    CALCIUM 9.6 07/31/2023    PHOS 2.2 (L) 01/08/2023    PROT 6.9 01/06/2023    ALBUMIN 3.0 (L) 01/06/2023    BILITOT 1.6 (H) 01/06/2023    ALKPHOS 129 01/06/2023    AST 19 01/06/2023    ALT 19 01/06/2023    ANIONGAP 10 07/31/2023    ESTGFRAFRICA >60.0 05/24/2022    EGFRNONAA >60.0 05/24/2022    TSH 3.670 05/24/2022     Assessment     1. Controlled type 2 diabetes mellitus without complication, without long-term current use of insulin  HEMOGLOBIN A1C    RENAL FUNCTION PANEL      2. Class 2 severe obesity due to excess calories with serious comorbidity and body mass index (BMI) of 35.0 to 35.9 in adult        3. Hypercholesterolemia          Plan     Controlled type 2 diabetes mellitus without complication, without long-term current use of insulin  - Diabetes is at goal, given most current A1C, and recent hospital admission for hyperglycemia  - Goal A1C for patient is 7%  - diabetes is complicated by dietary indiscretion, poor insight to diabetes> doing better  - Diabetic supplies/medications were reviewed this visit to ensure continue steady supplies  - Advised patient to get routine feet care, routine eye exam, plus routine maintenance screening  - Diabetes goal including glucose glucose and A1C goals discussed  - weight loss noted    Plan  - Adjustment made:  Continue Ozempic 0.5 mg once a week injection  - Continue glipizide/metformin combination pill, 5/500 mg 1 tablet twice a day, monitor for hypoglycemia  - Encouragement of dietary modification, portion size control, decreasing carbohydrates intake  - Advised pt to check glucoses regularly, asked to filled glucose log and bring back for review at next office visit  - check lab  work in 3-4 months re-evaluate  - Clear written instruction given on AVS. Follow up as scheduled    Class 2 severe obesity due to excess calories with serious comorbidity in adult  - Body mass index is 35.83 kg/m².  - dietary discussion as above  - continue to monitor weight  - continue Ozempic    Hypercholesterolemia  - lipid panel review today  - ASCVD Risk below: Statin: Taking  The ASCVD Risk score (Tea LAU, et al., 2019) failed to calculate for the following reasons:    The valid total cholesterol range is 130 to 320 mg/dL    Advised patient to follow up with PCP for routine health maintenance care.   RTC in 3-4 months    Anmol Baugh M.D.  Endocrinology  [facility]  8/7/2023      Disclaimer: This note has been generated in part with the use of voice-recognition software. There may be typographical errors that have been missed during proof-reading.

## 2023-08-07 NOTE — PATIENT INSTRUCTIONS
Glipizide/Metformin 5/500mg 1 pill  twice a day with food    Ozempic 0.5mg once a week injection    Goal blood sugar in the morning, before breakfast:   Glucose goal AFTER MEALS:    2 Hour after: less than 140  Before going to bed: 100-140  Do not go to bed with glucose less than 100  Have a small snack if glucose is lower than 100      Please check glucose 1x times a day (before breakfast).   You can keep track of the glucose with Glucose logs or any papers  Please filled out and bring back to next office visit for me to review  Document any (LOW BLOOD GLUCOSE) hypoglycemia  episode with date and time for me to review    We will plan an in-clinic visit in 4 months, with labs prior to that appointment.    Contact information:  Anmol Baugh M.D  [facility] [address], [place] [address]    Office:  [phone]  Fax:  [phone]     ----------------------------------------------------------    Anmol Baugh M.D  [facility] [address], [place] [address]    Office:  [phone]  Fax:  [phone]

## 2023-08-07 NOTE — ASSESSMENT & PLAN NOTE
- Diabetes is at goal, given most current A1C, and recent hospital admission for hyperglycemia  - Goal A1C for patient is 7%  - diabetes is complicated by dietary indiscretion, poor insight to diabetes> doing better  - Diabetic supplies/medications were reviewed this visit to ensure continue steady supplies  - Advised patient to get routine feet care, routine eye exam, plus routine maintenance screening  - Diabetes goal including glucose glucose and A1C goals discussed  - weight loss noted    Plan  - Adjustment made:  Continue Ozempic 0.5 mg once a week injection  - Continue glipizide/metformin combination pill, 5/500 mg 1 tablet twice a day, monitor for hypoglycemia  - Encouragement of dietary modification, portion size control, decreasing carbohydrates intake  - Advised pt to check glucoses regularly, asked to filled glucose log and bring back for review at next office visit  - check lab work in 3-4 months re-evaluate  - Clear written instruction given on AVS. Follow up as scheduled

## 2023-08-25 DIAGNOSIS — E78.00 HYPERCHOLESTEROLEMIA: ICD-10-CM

## 2023-08-25 DIAGNOSIS — E11.69 HYPERLIPIDEMIA ASSOCIATED WITH TYPE 2 DIABETES MELLITUS: ICD-10-CM

## 2023-08-25 DIAGNOSIS — E78.5 HYPERLIPIDEMIA ASSOCIATED WITH TYPE 2 DIABETES MELLITUS: ICD-10-CM

## 2023-08-25 RX ORDER — ATORVASTATIN CALCIUM 10 MG/1
10 TABLET, FILM COATED ORAL
Qty: 90 TABLET | Refills: 0 | Status: SHIPPED | OUTPATIENT
Start: 2023-08-25 | End: 2024-01-29

## 2023-08-25 NOTE — TELEPHONE ENCOUNTER
No care due was identified.  [facility] Embedded Care Due Messages. Reference number: [id number].   8/25/2023 2:18:03 PM CDT

## 2023-11-03 ENCOUNTER — OFFICE VISIT (OUTPATIENT)
Dept: INTERNAL MEDICINE | Facility: CLINIC | Age: 68
End: 2023-11-03
Payer: MEDICARE

## 2023-11-03 VITALS
WEIGHT: 220.44 LBS | SYSTOLIC BLOOD PRESSURE: 124 MMHG | HEART RATE: 70 BPM | HEIGHT: 66 IN | OXYGEN SATURATION: 98 % | BODY MASS INDEX: 35.43 KG/M2 | DIASTOLIC BLOOD PRESSURE: 68 MMHG

## 2023-11-03 DIAGNOSIS — N18.31 STAGE 3A CHRONIC KIDNEY DISEASE: ICD-10-CM

## 2023-11-03 DIAGNOSIS — E66.01 CLASS 2 SEVERE OBESITY DUE TO EXCESS CALORIES WITH SERIOUS COMORBIDITY AND BODY MASS INDEX (BMI) OF 35.0 TO 35.9 IN ADULT: ICD-10-CM

## 2023-11-03 DIAGNOSIS — E78.5 HYPERLIPIDEMIA ASSOCIATED WITH TYPE 2 DIABETES MELLITUS: ICD-10-CM

## 2023-11-03 DIAGNOSIS — E66.9 DIABETES MELLITUS TYPE 2 IN OBESE: Chronic | ICD-10-CM

## 2023-11-03 DIAGNOSIS — Z00.00 ENCOUNTER FOR PREVENTIVE HEALTH EXAMINATION: Primary | ICD-10-CM

## 2023-11-03 DIAGNOSIS — E11.69 HYPERLIPIDEMIA ASSOCIATED WITH TYPE 2 DIABETES MELLITUS: ICD-10-CM

## 2023-11-03 DIAGNOSIS — E11.9 CONTROLLED TYPE 2 DIABETES MELLITUS WITHOUT COMPLICATION, WITHOUT LONG-TERM CURRENT USE OF INSULIN: ICD-10-CM

## 2023-11-03 DIAGNOSIS — N18.31 TYPE 2 DIABETES MELLITUS WITH STAGE 3A CHRONIC KIDNEY DISEASE, WITHOUT LONG-TERM CURRENT USE OF INSULIN: ICD-10-CM

## 2023-11-03 DIAGNOSIS — I15.2 HYPERTENSION ASSOCIATED WITH DIABETES: ICD-10-CM

## 2023-11-03 DIAGNOSIS — E11.69 DIABETES MELLITUS TYPE 2 IN OBESE: Chronic | ICD-10-CM

## 2023-11-03 DIAGNOSIS — I70.0 AORTIC ATHEROSCLEROSIS: ICD-10-CM

## 2023-11-03 DIAGNOSIS — E11.59 HYPERTENSION ASSOCIATED WITH DIABETES: ICD-10-CM

## 2023-11-03 DIAGNOSIS — E11.22 TYPE 2 DIABETES MELLITUS WITH STAGE 3A CHRONIC KIDNEY DISEASE, WITHOUT LONG-TERM CURRENT USE OF INSULIN: ICD-10-CM

## 2023-11-03 DIAGNOSIS — Z00.00 ENCOUNTER FOR MEDICARE ANNUAL WELLNESS EXAM: ICD-10-CM

## 2023-11-03 PROCEDURE — G0439 PR MEDICARE ANNUAL WELLNESS SUBSEQUENT VISIT: ICD-10-PCS | Mod: S$GLB,,, | Performed by: NURSE PRACTITIONER

## 2023-11-03 PROCEDURE — 1101F PR PT FALLS ASSESS DOC 0-1 FALLS W/OUT INJ PAST YR: ICD-10-PCS | Mod: CPTII,S$GLB,, | Performed by: NURSE PRACTITIONER

## 2023-11-03 PROCEDURE — 99999 PR PBB SHADOW E&M-EST. PATIENT-LVL V: CPT | Mod: PBBFAC,,, | Performed by: NURSE PRACTITIONER

## 2023-11-03 PROCEDURE — G0439 PPPS, SUBSEQ VISIT: HCPCS | Mod: S$GLB,,, | Performed by: NURSE PRACTITIONER

## 2023-11-03 PROCEDURE — 3051F HG A1C>EQUAL 7.0%<8.0%: CPT | Mod: CPTII,S$GLB,, | Performed by: NURSE PRACTITIONER

## 2023-11-03 PROCEDURE — 4010F PR ACE/ARB THEARPY RXD/TAKEN: ICD-10-PCS | Mod: CPTII,S$GLB,, | Performed by: NURSE PRACTITIONER

## 2023-11-03 PROCEDURE — 3074F PR MOST RECENT SYSTOLIC BLOOD PRESSURE < 130 MM HG: ICD-10-PCS | Mod: CPTII,S$GLB,, | Performed by: NURSE PRACTITIONER

## 2023-11-03 PROCEDURE — 1101F PT FALLS ASSESS-DOCD LE1/YR: CPT | Mod: CPTII,S$GLB,, | Performed by: NURSE PRACTITIONER

## 2023-11-03 PROCEDURE — 3078F DIAST BP <80 MM HG: CPT | Mod: CPTII,S$GLB,, | Performed by: NURSE PRACTITIONER

## 2023-11-03 PROCEDURE — 3066F PR DOCUMENTATION OF TREATMENT FOR NEPHROPATHY: ICD-10-PCS | Mod: CPTII,S$GLB,, | Performed by: NURSE PRACTITIONER

## 2023-11-03 PROCEDURE — 3051F PR MOST RECENT HEMOGLOBIN A1C LEVEL 7.0 - < 8.0%: ICD-10-PCS | Mod: CPTII,S$GLB,, | Performed by: NURSE PRACTITIONER

## 2023-11-03 PROCEDURE — 1160F RVW MEDS BY RX/DR IN RCRD: CPT | Mod: CPTII,S$GLB,, | Performed by: NURSE PRACTITIONER

## 2023-11-03 PROCEDURE — 3288F FALL RISK ASSESSMENT DOCD: CPT | Mod: CPTII,S$GLB,, | Performed by: NURSE PRACTITIONER

## 2023-11-03 PROCEDURE — 1160F PR REVIEW ALL MEDS BY PRESCRIBER/CLIN PHARMACIST DOCUMENTED: ICD-10-PCS | Mod: CPTII,S$GLB,, | Performed by: NURSE PRACTITIONER

## 2023-11-03 PROCEDURE — 4010F ACE/ARB THERAPY RXD/TAKEN: CPT | Mod: CPTII,S$GLB,, | Performed by: NURSE PRACTITIONER

## 2023-11-03 PROCEDURE — 3072F PR LOW RISK FOR RETINOPATHY: ICD-10-PCS | Mod: CPTII,S$GLB,, | Performed by: NURSE PRACTITIONER

## 2023-11-03 PROCEDURE — 1125F AMNT PAIN NOTED PAIN PRSNT: CPT | Mod: CPTII,S$GLB,, | Performed by: NURSE PRACTITIONER

## 2023-11-03 PROCEDURE — 3072F LOW RISK FOR RETINOPATHY: CPT | Mod: CPTII,S$GLB,, | Performed by: NURSE PRACTITIONER

## 2023-11-03 PROCEDURE — 3066F NEPHROPATHY DOC TX: CPT | Mod: CPTII,S$GLB,, | Performed by: NURSE PRACTITIONER

## 2023-11-03 PROCEDURE — 3061F NEG MICROALBUMINURIA REV: CPT | Mod: CPTII,S$GLB,, | Performed by: NURSE PRACTITIONER

## 2023-11-03 PROCEDURE — 1159F PR MEDICATION LIST DOCUMENTED IN MEDICAL RECORD: ICD-10-PCS | Mod: CPTII,S$GLB,, | Performed by: NURSE PRACTITIONER

## 2023-11-03 PROCEDURE — 1170F PR FUNCTIONAL STATUS ASSESSED: ICD-10-PCS | Mod: CPTII,S$GLB,, | Performed by: NURSE PRACTITIONER

## 2023-11-03 PROCEDURE — 3078F PR MOST RECENT DIASTOLIC BLOOD PRESSURE < 80 MM HG: ICD-10-PCS | Mod: CPTII,S$GLB,, | Performed by: NURSE PRACTITIONER

## 2023-11-03 PROCEDURE — 1125F PR PAIN SEVERITY QUANTIFIED, PAIN PRESENT: ICD-10-PCS | Mod: CPTII,S$GLB,, | Performed by: NURSE PRACTITIONER

## 2023-11-03 PROCEDURE — 3288F PR FALLS RISK ASSESSMENT DOCUMENTED: ICD-10-PCS | Mod: CPTII,S$GLB,, | Performed by: NURSE PRACTITIONER

## 2023-11-03 PROCEDURE — 3061F PR NEG MICROALBUMINURIA RESULT DOCUMENTED/REVIEW: ICD-10-PCS | Mod: CPTII,S$GLB,, | Performed by: NURSE PRACTITIONER

## 2023-11-03 PROCEDURE — 1170F FXNL STATUS ASSESSED: CPT | Mod: CPTII,S$GLB,, | Performed by: NURSE PRACTITIONER

## 2023-11-03 PROCEDURE — 99999 PR PBB SHADOW E&M-EST. PATIENT-LVL V: ICD-10-PCS | Mod: PBBFAC,,, | Performed by: NURSE PRACTITIONER

## 2023-11-03 PROCEDURE — 1159F MED LIST DOCD IN RCRD: CPT | Mod: CPTII,S$GLB,, | Performed by: NURSE PRACTITIONER

## 2023-11-03 PROCEDURE — 3074F SYST BP LT 130 MM HG: CPT | Mod: CPTII,S$GLB,, | Performed by: NURSE PRACTITIONER

## 2023-11-03 NOTE — PATIENT INSTRUCTIONS
1. Schedule with Caryn Alarcon MD for regular follow up.    2. Schedule with new eye dr.     3. Discuss colon cancer screening with Caryn Alarcon MD.    Counseling and Referral of Other Preventative  (Italic type indicates deductible and co-insurance are waived)    Patient Name: Maria Alejandra De La Rosa  Today's Date: 11/3/2023    Health Maintenance         Date Due Completion Date    Lipid Panel 05/24/2023 5/24/2022    Colorectal Cancer Screening 07/21/2023 7/21/2022    Eye Exam 10/11/2023 10/11/2022    Override on 3/28/2017: Done    RSV Vaccine (Age 60+) (1 - 1-dose 60+ series) 11/03/2023 (Originally 2/7/2013) ---    Influenza Vaccine (1) 06/30/2024 (Originally 9/1/2023) 1/30/2019    Override on 11/28/2017: Declined    TETANUS VACCINE 11/03/2024 (Originally 2/7/1971) ---    Shingles Vaccine (1 of 2) 11/03/2024 (Originally 2/7/2003) ---    COVID-19 Vaccine (1) 11/03/2024 (Originally 1955) ---    Foot Exam 01/19/2024 1/19/2023 (Done)    Override on 1/19/2023: Done    Override on 1/30/2019: Done    Hemoglobin A1c 01/31/2024 7/31/2023    Override on 11/7/2017: Done ([facility]gaic 7.6 [facility])    Mammogram 03/09/2024 3/9/2023    Override on 11/16/2018: Done (mammogram ej)    Override on 8/14/2017: Done (WNL - repeat 1 yr - @ [facility] (Dr. Guerrier) - [place])    Diabetes Urine Screening 04/28/2024 4/28/2023    Low Dose Statin 08/25/2024 8/25/2023    DEXA Scan 05/25/2025 5/25/2022    Override on 11/16/2018: Done          Orders Placed This Encounter   Procedures    Ambulatory referral/consult to Optometry     The following information is provided to all patients.  This information is to help you find resources for any of the problems found today that may be affecting your health:                Living healthy guide: www.[facility].[place].[facility]      Understanding Diabetes: www.diabetes.org      Eating healthy: www.cdc.gov/healthyweight      [facility] home safety checklist:  www.cdc.gov/steadi/patient.html      Agency on Aging: www.goea.[place].[facility]      Alcoholics anonymous (AA): www.aa.org      Physical Activity: www.jayro.nih.gov/xm2nnas      Tobacco use: www.quitwithusla.org

## 2023-11-03 NOTE — PROGRESS NOTES
"Maria Alejandra De La Rosa presented for a  Medicare AWV and comprehensive Health Risk Assessment today. The following components were reviewed and updated:    Medical history  Family History  Social history  Allergies and Current Medications  Health Risk Assessment  Health Maintenance  Care Team         ** See Completed Assessments for Annual Wellness Visit within the encounter summary.**         The following assessments were completed:  Living Situation  CAGE  Depression Screening  Timed Get Up and Go  Whisper Test - Abnormal. Declines audiology referral.  Cognitive Function Screening    Nutrition Screening  ADL Screening  PAQ Screening  Review for Opioid Screening: Pt does not have Rx for Opioids.  Review for Substance Use Disorders: Patient does not use substances.        Vitals:    11/03/23 0828   BP: 124/68   BP Location: Left arm   Pulse: 70   SpO2: 98%   Weight: 100 kg (220 lb 7.4 oz)   Height: 5' 6" (1.676 m)     Body mass index is 35.58 kg/m².    Physical Exam  Vitals reviewed.   Constitutional:       Appearance: Normal appearance.   HENT:      Head: Normocephalic.   Cardiovascular:      Rate and Rhythm: Normal rate.   Pulmonary:      Effort: Pulmonary effort is normal.   Abdominal:      General: Bowel sounds are normal.   Musculoskeletal:         General: Normal range of motion.      Right lower leg: No edema.      Left lower leg: No edema.   Skin:     General: Skin is warm and dry.      Capillary Refill: Capillary refill takes less than 2 seconds.   Neurological:      Mental Status: She is alert and oriented to person, place, and time.   Psychiatric:         Behavior: Behavior normal.         Thought Content: Thought content normal.         Judgment: Judgment normal.               Diagnoses and health risks identified today and associated recommendations/orders:    1. Encounter for preventive health examination  Assessments completed.  HM recommendations reviewed. Declines vaccines. Discussed colon cancer screening, " defers for now. Optometry referral placed.  F/u with PCP as instructed.    2. Stage 3a chronic kidney disease  Chronic, stable on current regimen. Followed by PCP. Recent GFR >60.    3. Type 2 diabetes mellitus with stage 3a chronic kidney disease, without long-term current use of insulin  Chronic, stable on current regimen. Followed by PCP. Recent GFR >60.    4. Aortic atherosclerosis  Chronic, stable on current regimen. Followed by PCP. On statin.    5. Diabetes mellitus type 2 in obese  Chronic, stable on current regimen. Followed by PCP. On ozempic.     6. Controlled type 2 diabetes mellitus without complication, without long-term current use of insulin  Chronic, stable on current regimen. Followed by PCP.  - Ambulatory referral/consult to Optometry; Future    7. Class 2 severe obesity due to excess calories with serious comorbidity and body mass index (BMI) of 35.0 to 35.9 in adult  Chronic, stable on current regimen. Followed by PCP.    8. Hypertension associated with diabetes  Chronic, stable on current regimen. Followed by PCP.    9. Hyperlipidemia associated with type 2 diabetes mellitus  Chronic, stable on current regimen. Followed by PCP.    10. Encounter for Medicare annual wellness exam  Medicare HRA completed.  - Ambulatory Referral/Consult to Enhanced Annual Wellness Visit (eAWV)      Provided Maria Alejandra with a 5-10 year written screening schedule and personal prevention plan. Recommendations were developed using the USPSTF age appropriate recommendations. Education, counseling, and referrals were provided as needed. After Visit Summary printed and given to patient which includes a list of additional screenings\tests needed.    Follow up in about 1 year (around 11/3/2024) for Medicare AWV and with PCP as instructed.       May Oconnor NP    I offered to discuss advanced care planning, including how to pick a person who would make decisions for you if you were unable to make them for yourself, called a  health care power of , and what kind of decisions you might make such as use of life sustaining treatments such as ventilators and tube feeding when faced with a life limiting illness recorded on a living will that they will need to know. (How you want to be cared for as you near the end of your natural life)     X  Patient has advanced directives written and agrees to provide copies to the institution.

## 2023-11-14 ENCOUNTER — HOSPITAL ENCOUNTER (OUTPATIENT)
Dept: RADIOLOGY | Facility: HOSPITAL | Age: 68
Discharge: HOME | End: 2023-11-14
Attending: STUDENT IN AN ORGANIZED HEALTH CARE EDUCATION/TRAINING PROGRAM
Payer: MEDICARE

## 2023-11-14 ENCOUNTER — OFFICE VISIT (OUTPATIENT)
Dept: INTERNAL MEDICINE | Facility: CLINIC | Age: 68
End: 2023-11-14
Payer: MEDICARE

## 2023-11-14 VITALS
HEART RATE: 70 BPM | HEIGHT: 66 IN | BODY MASS INDEX: 36.24 KG/M2 | DIASTOLIC BLOOD PRESSURE: 66 MMHG | OXYGEN SATURATION: 97 % | WEIGHT: 225.5 LBS | SYSTOLIC BLOOD PRESSURE: 124 MMHG

## 2023-11-14 DIAGNOSIS — M79.601 RIGHT ARM PAIN: Primary | ICD-10-CM

## 2023-11-14 DIAGNOSIS — M79.601 RIGHT ARM PAIN: ICD-10-CM

## 2023-11-14 DIAGNOSIS — F33.2 SEVERE EPISODE OF RECURRENT MAJOR DEPRESSIVE DISORDER, WITHOUT PSYCHOTIC FEATURES: ICD-10-CM

## 2023-11-14 PROCEDURE — 73030 XR SHOULDER TRAUMA 3 VIEW RIGHT: ICD-10-PCS | Mod: 26,RT,, | Performed by: RADIOLOGY

## 2023-11-14 PROCEDURE — 3072F LOW RISK FOR RETINOPATHY: CPT | Mod: CPTII,S$GLB,, | Performed by: STUDENT IN AN ORGANIZED HEALTH CARE EDUCATION/TRAINING PROGRAM

## 2023-11-14 PROCEDURE — 1101F PT FALLS ASSESS-DOCD LE1/YR: CPT | Mod: CPTII,S$GLB,, | Performed by: STUDENT IN AN ORGANIZED HEALTH CARE EDUCATION/TRAINING PROGRAM

## 2023-11-14 PROCEDURE — 73070 X-RAY EXAM OF ELBOW: CPT | Mod: TC,RT

## 2023-11-14 PROCEDURE — 99999 PR PBB SHADOW E&M-EST. PATIENT-LVL V: ICD-10-PCS | Mod: PBBFAC,,, | Performed by: STUDENT IN AN ORGANIZED HEALTH CARE EDUCATION/TRAINING PROGRAM

## 2023-11-14 PROCEDURE — 3061F NEG MICROALBUMINURIA REV: CPT | Mod: CPTII,S$GLB,, | Performed by: STUDENT IN AN ORGANIZED HEALTH CARE EDUCATION/TRAINING PROGRAM

## 2023-11-14 PROCEDURE — 1159F PR MEDICATION LIST DOCUMENTED IN MEDICAL RECORD: ICD-10-PCS | Mod: CPTII,S$GLB,, | Performed by: STUDENT IN AN ORGANIZED HEALTH CARE EDUCATION/TRAINING PROGRAM

## 2023-11-14 PROCEDURE — 3051F PR MOST RECENT HEMOGLOBIN A1C LEVEL 7.0 - < 8.0%: ICD-10-PCS | Mod: CPTII,S$GLB,, | Performed by: STUDENT IN AN ORGANIZED HEALTH CARE EDUCATION/TRAINING PROGRAM

## 2023-11-14 PROCEDURE — 99999 PR PBB SHADOW E&M-EST. PATIENT-LVL V: CPT | Mod: PBBFAC,,, | Performed by: STUDENT IN AN ORGANIZED HEALTH CARE EDUCATION/TRAINING PROGRAM

## 2023-11-14 PROCEDURE — 3066F NEPHROPATHY DOC TX: CPT | Mod: CPTII,S$GLB,, | Performed by: STUDENT IN AN ORGANIZED HEALTH CARE EDUCATION/TRAINING PROGRAM

## 2023-11-14 PROCEDURE — 3078F PR MOST RECENT DIASTOLIC BLOOD PRESSURE < 80 MM HG: ICD-10-PCS | Mod: CPTII,S$GLB,, | Performed by: STUDENT IN AN ORGANIZED HEALTH CARE EDUCATION/TRAINING PROGRAM

## 2023-11-14 PROCEDURE — 73030 X-RAY EXAM OF SHOULDER: CPT | Mod: TC,RT

## 2023-11-14 PROCEDURE — 1160F PR REVIEW ALL MEDS BY PRESCRIBER/CLIN PHARMACIST DOCUMENTED: ICD-10-PCS | Mod: CPTII,S$GLB,, | Performed by: STUDENT IN AN ORGANIZED HEALTH CARE EDUCATION/TRAINING PROGRAM

## 2023-11-14 PROCEDURE — 4010F PR ACE/ARB THEARPY RXD/TAKEN: ICD-10-PCS | Mod: CPTII,S$GLB,, | Performed by: STUDENT IN AN ORGANIZED HEALTH CARE EDUCATION/TRAINING PROGRAM

## 2023-11-14 PROCEDURE — 3288F FALL RISK ASSESSMENT DOCD: CPT | Mod: CPTII,S$GLB,, | Performed by: STUDENT IN AN ORGANIZED HEALTH CARE EDUCATION/TRAINING PROGRAM

## 2023-11-14 PROCEDURE — 3051F HG A1C>EQUAL 7.0%<8.0%: CPT | Mod: CPTII,S$GLB,, | Performed by: STUDENT IN AN ORGANIZED HEALTH CARE EDUCATION/TRAINING PROGRAM

## 2023-11-14 PROCEDURE — 3008F PR BODY MASS INDEX (BMI) DOCUMENTED: ICD-10-PCS | Mod: CPTII,S$GLB,, | Performed by: STUDENT IN AN ORGANIZED HEALTH CARE EDUCATION/TRAINING PROGRAM

## 2023-11-14 PROCEDURE — 73030 X-RAY EXAM OF SHOULDER: CPT | Mod: 26,RT,, | Performed by: RADIOLOGY

## 2023-11-14 PROCEDURE — 4010F ACE/ARB THERAPY RXD/TAKEN: CPT | Mod: CPTII,S$GLB,, | Performed by: STUDENT IN AN ORGANIZED HEALTH CARE EDUCATION/TRAINING PROGRAM

## 2023-11-14 PROCEDURE — 73070 XR ELBOW 2 VIEWS RIGHT: ICD-10-PCS | Mod: 26,RT,, | Performed by: RADIOLOGY

## 2023-11-14 PROCEDURE — 73070 X-RAY EXAM OF ELBOW: CPT | Mod: 26,RT,, | Performed by: RADIOLOGY

## 2023-11-14 PROCEDURE — 3074F SYST BP LT 130 MM HG: CPT | Mod: CPTII,S$GLB,, | Performed by: STUDENT IN AN ORGANIZED HEALTH CARE EDUCATION/TRAINING PROGRAM

## 2023-11-14 PROCEDURE — 1125F AMNT PAIN NOTED PAIN PRSNT: CPT | Mod: CPTII,S$GLB,, | Performed by: STUDENT IN AN ORGANIZED HEALTH CARE EDUCATION/TRAINING PROGRAM

## 2023-11-14 PROCEDURE — 3072F PR LOW RISK FOR RETINOPATHY: ICD-10-PCS | Mod: CPTII,S$GLB,, | Performed by: STUDENT IN AN ORGANIZED HEALTH CARE EDUCATION/TRAINING PROGRAM

## 2023-11-14 PROCEDURE — 1160F RVW MEDS BY RX/DR IN RCRD: CPT | Mod: CPTII,S$GLB,, | Performed by: STUDENT IN AN ORGANIZED HEALTH CARE EDUCATION/TRAINING PROGRAM

## 2023-11-14 PROCEDURE — 3061F PR NEG MICROALBUMINURIA RESULT DOCUMENTED/REVIEW: ICD-10-PCS | Mod: CPTII,S$GLB,, | Performed by: STUDENT IN AN ORGANIZED HEALTH CARE EDUCATION/TRAINING PROGRAM

## 2023-11-14 PROCEDURE — 99214 PR OFFICE/OUTPT VISIT, EST, LEVL IV, 30-39 MIN: ICD-10-PCS | Mod: S$GLB,,, | Performed by: STUDENT IN AN ORGANIZED HEALTH CARE EDUCATION/TRAINING PROGRAM

## 2023-11-14 PROCEDURE — 99214 OFFICE O/P EST MOD 30 MIN: CPT | Mod: S$GLB,,, | Performed by: STUDENT IN AN ORGANIZED HEALTH CARE EDUCATION/TRAINING PROGRAM

## 2023-11-14 PROCEDURE — 1125F PR PAIN SEVERITY QUANTIFIED, PAIN PRESENT: ICD-10-PCS | Mod: CPTII,S$GLB,, | Performed by: STUDENT IN AN ORGANIZED HEALTH CARE EDUCATION/TRAINING PROGRAM

## 2023-11-14 PROCEDURE — 1101F PR PT FALLS ASSESS DOC 0-1 FALLS W/OUT INJ PAST YR: ICD-10-PCS | Mod: CPTII,S$GLB,, | Performed by: STUDENT IN AN ORGANIZED HEALTH CARE EDUCATION/TRAINING PROGRAM

## 2023-11-14 PROCEDURE — 3066F PR DOCUMENTATION OF TREATMENT FOR NEPHROPATHY: ICD-10-PCS | Mod: CPTII,S$GLB,, | Performed by: STUDENT IN AN ORGANIZED HEALTH CARE EDUCATION/TRAINING PROGRAM

## 2023-11-14 PROCEDURE — 3074F PR MOST RECENT SYSTOLIC BLOOD PRESSURE < 130 MM HG: ICD-10-PCS | Mod: CPTII,S$GLB,, | Performed by: STUDENT IN AN ORGANIZED HEALTH CARE EDUCATION/TRAINING PROGRAM

## 2023-11-14 PROCEDURE — 3008F BODY MASS INDEX DOCD: CPT | Mod: CPTII,S$GLB,, | Performed by: STUDENT IN AN ORGANIZED HEALTH CARE EDUCATION/TRAINING PROGRAM

## 2023-11-14 PROCEDURE — 1159F MED LIST DOCD IN RCRD: CPT | Mod: CPTII,S$GLB,, | Performed by: STUDENT IN AN ORGANIZED HEALTH CARE EDUCATION/TRAINING PROGRAM

## 2023-11-14 PROCEDURE — 3288F PR FALLS RISK ASSESSMENT DOCUMENTED: ICD-10-PCS | Mod: CPTII,S$GLB,, | Performed by: STUDENT IN AN ORGANIZED HEALTH CARE EDUCATION/TRAINING PROGRAM

## 2023-11-14 PROCEDURE — 3078F DIAST BP <80 MM HG: CPT | Mod: CPTII,S$GLB,, | Performed by: STUDENT IN AN ORGANIZED HEALTH CARE EDUCATION/TRAINING PROGRAM

## 2023-11-14 RX ORDER — DICLOFENAC SODIUM 10 MG/G
2 GEL TOPICAL 4 TIMES DAILY
Qty: 100 G | Refills: 5 | Status: SHIPPED | OUTPATIENT
Start: 2023-11-14

## 2023-11-14 RX ORDER — SERTRALINE HYDROCHLORIDE 25 MG/1
25 TABLET, FILM COATED ORAL DAILY
Qty: 30 TABLET | Refills: 11 | Status: SHIPPED | OUTPATIENT
Start: 2023-11-14 | End: 2024-11-13

## 2023-11-14 NOTE — PATIENT INSTRUCTIONS
Follow up with a therapist for talk therapy.   Start Sertraline for your depression. Let me know if you have any problems with appetite changes, sleep changes, headaches, stomach aches.     Over the Counter Options for Pain    Voltaren Gel: Apply 2 g to skin over affected areas 4 times a day as needed.     Capsaicin Cream: Apply to skin over affected area 3 to 4 times a day as needed. Do not apply to open wounds or irritated skin. Wash your hands after application to avoid getting it in eyes.     Lidocaine (Salonpas) Patch: Apply over affected are and leave in place for 8 to 12 hours as needed.     Acetaminophen (Tylenol): Take 1,000 mg by mouth every 4 to 6 hours as needed. Do not take more than 3,000 mg in a day.     Ice Packs:  - Fill a bag with crushed ice about half full. Remove the air from the bag before you close it. You can also use a bag of frozen vegetables.    - Wrap the ice pack in a cloth to protect your skin from frostbite or other injury.    - Put the ice over the injured area for 20 to 30 minutes or as long as directed.  Check your skin after about 30 seconds for color changes or blistering. Remove the ice if you notice skin changes or you feel burning or numbness in the area.    - Throw the ice pack away after use.    - Apply ice to your injured area 4 times each day or as directed. Ask your healthcare provider how many days you should apply ice.    Heating Pad:  Apply to affected area for NO LONGER than 15 minutes. Use a layer of towels between your skin and the heating pad. Remove for at least 1 hour then repeat. 2-3 applications a day is advisable.

## 2023-11-14 NOTE — PROGRESS NOTES
SUBJECTIVE     Chief Complaint   Patient presents with    Arm Pain       HPI  Maria Alejandra De La Rosa is a 68 y.o. left-handed female with  T2DM, HTN, HLD  that presents for urgent care evaluation right arm pain.     This is a new patient to me but established to [facility]. PCP is Caryn Ambrosio MD.     3 month history of intermittent arm pain.   No preceding injury, accident, increased use prior to onset.   Pain feels like she is being stuck with a needle, from above the elbow to below the shoulder.   Pain is worse while trying to sleep.   Relieved with hold arm straight down. No relief with heating pad. No other interventions tried.   Feels like arm is weak, denies numbness or tingling in the arm.  Denies and weakness, increased clumsiness in her right hand.     Has a complaint of recurrent depression.  States that she has been dealing with depression on and off since she was in her house during her cane [place].  Recently lost a dog as well which has added to her depression.  Has not have motivation to get out bed most mornings, easily becomes tearful.  She has weaned herself off of Lexapro, which she states did not help.  Has previously been on Prozac and Cymbalta in the past per EHR search.  She thinks these did not help in that she had adverse effects to 1 of these medications, unclear which medication and what effects she was having.  She denies SI/HI/AVH.  Has seen a therapist in the past, has his number and states that she plans on seeing him again.          PAST MEDICAL HISTORY:  Past Medical History:   Diagnosis Date    Cataract     Depression     Diabetes mellitus type 2 in obese 2018    Diabetes mellitus, type 2     DJD (degenerative joint disease)     Dysmetabolic syndrome     Hyperglycemia     Hypertension     Lumbar stenosis     Sleep apnea        PAST SURGICAL HISTORY:  Past Surgical History:   Procedure Laterality Date    BACK SURGERY       SECTION      CHOLECYSTECTOMY         FAMILY  HISTORY:  Family History   Problem Relation Age of Onset    Cancer Mother         lung cancer    Cataracts Mother     COPD Mother     Cancer Father         lung cancer     Diabetes Maternal Grandmother     No Known Problems Sister     No Known Problems Brother     No Known Problems Maternal Aunt     No Known Problems Maternal Uncle     No Known Problems Paternal Aunt     No Known Problems Paternal Uncle     No Known Problems Maternal Grandfather     No Known Problems Paternal Grandmother     No Known Problems Paternal Grandfather     Amblyopia Neg Hx     Blindness Neg Hx     Glaucoma Neg Hx     Hypertension Neg Hx     Macular degeneration Neg Hx     Retinal detachment Neg Hx     Strabismus Neg Hx     Stroke Neg Hx     Thyroid disease Neg Hx        ALLERGIES AND MEDICATIONS: updated and reviewed.  Review of patient's allergies indicates:   Allergen Reactions    Penicillin Hives    Penicillins      Other reaction(s): Rash    Vicodin  [hydrocodone-acetaminophen]      Other reaction(s): Unknown    Metformin Diarrhea     Diarrhea      Current Outpatient Medications   Medication Sig Dispense Refill    alcohol swabs (BD ALCOHOL SWABS) PadM Apply 1 each topically as needed (blood sugar testing). 100 each 1    atorvastatin (LIPITOR) 10 MG tablet TAKE 1 TABLET EVERY DAY 90 tablet 0    blood sugar diagnostic (ONETOUCH VERIO TEST STRIPS) Strp Use 4 times a day. DX code  E 11.42 200 strip 3    carvediloL (COREG) 12.5 MG tablet TAKE 1 TABLET (12.5 MG TOTAL) BY MOUTH 2 (TWO) TIMES DAILY WITH MEALS. 180 tablet 3    cyanocobalamin 500 MCG tablet Take 500 mcg by mouth once daily.      EScitalopram oxalate (LEXAPRO) 5 MG Tab Take 1 tablet (5 mg total) by mouth once daily. 90 tablet 1    glipizide-metformin (METAGLIP) 5-500 mg per tablet Take 1 tablet by mouth 2 (two) times daily before meals. 180 tablet 3    hydroCHLOROthiazide (HYDRODIURIL) 25 MG tablet TAKE 1 TABLET (25 MG TOTAL) BY MOUTH ONCE DAILY. 90 tablet 3    losartan (COZAAR)  100 MG tablet TAKE 1 TABLET ONE TIME DAILY 90 tablet 0    multivitamin capsule Take 1 capsule by mouth once daily.      ONETOUCH DELICA LANCETS 33 gauge Misc Inject 1 lancet into the skin 2 (two) times daily. 60 each 12    semaglutide (OZEMPIC) 0.25 mg or 0.5 mg (2 mg/3 mL) pen injector Inject 0.5 mg into the skin every 7 days. 3 mL 5    vitamin D 1000 units Tab Take 1,000 Units by mouth once daily.       No current facility-administered medications for this visit.       ROS  Review of Systems   Constitutional:  Negative for activity change, chills and fever.   HENT:  Negative for congestion and hearing loss.    Eyes:  Negative for pain and visual disturbance.   Respiratory:  Negative for cough and shortness of breath.    Cardiovascular:  Negative for chest pain and palpitations.   Gastrointestinal:  Negative for abdominal pain, constipation, diarrhea, nausea and vomiting.   Endocrine: Negative.    Genitourinary: Negative.    Musculoskeletal:  Negative for arthralgias and myalgias.   Skin: Negative.    Allergic/Immunologic: Negative.    Neurological:  Negative for dizziness, light-headedness and headaches.   Hematological: Negative.    Psychiatric/Behavioral:  Positive for dysphoric mood. Negative for decreased concentration, hallucinations, self-injury, sleep disturbance and suicidal ideas. The patient is not nervous/anxious.          OBJECTIVE     Physical Exam  Vitals:    11/14/23 0954   BP: 124/66   Pulse: 70    Body mass index is 36.4 kg/m².            Physical Exam  Vitals reviewed.   Constitutional:       General: She is not in acute distress.     Appearance: Normal appearance.   HENT:      Head: Normocephalic and atraumatic.      Mouth/Throat:      Mouth: Mucous membranes are moist.      Pharynx: Oropharynx is clear.   Eyes:      Extraocular Movements: Extraocular movements intact.      Conjunctiva/sclera: Conjunctivae normal.      Pupils: Pupils are equal, round, and reactive to light.   Cardiovascular:       Rate and Rhythm: Normal rate and regular rhythm.      Pulses: Normal pulses.      Heart sounds: Normal heart sounds.   Pulmonary:      Effort: Pulmonary effort is normal.      Breath sounds: Normal breath sounds.   Abdominal:      General: There is no distension.   Musculoskeletal:      Right shoulder: No swelling, deformity or effusion. Decreased range of motion (decreased forward flexion).      Right upper arm: Normal. No deformity or tenderness.      Cervical back: Normal range of motion and neck supple.      Comments: Negative empty can test on right.     Pain with external rotation of right arm with arm at side and elbow flexed to 90 degrees.   No muscle wasting appreciated to shoulders.   Pain with abduction of right arm above 90 degrees.   Positive Neer's right  Positive cross-arm test right.     Skin:     General: Skin is warm and dry.   Neurological:      General: No focal deficit present.      Mental Status: She is alert.   Psychiatric:         Mood and Affect: Mood is depressed. Mood is not anxious. Affect is tearful.           Health Maintenance         Date Due Completion Date    RSV Vaccine (Age 60+) (1 - 1-dose 60+ series) Never done ---    Lipid Panel 05/24/2023 5/24/2022    Colorectal Cancer Screening 07/21/2023 7/21/2022    Eye Exam 10/11/2023 10/11/2022    Override on 3/28/2017: Done    Influenza Vaccine (1) 06/30/2024 (Originally 9/1/2023) 1/30/2019    Override on 11/28/2017: Declined    TETANUS VACCINE 11/03/2024 (Originally 2/7/1973) ---    Shingles Vaccine (1 of 2) 11/03/2024 (Originally 2/7/2003) ---    COVID-19 Vaccine (1) 11/03/2024 (Originally 1955) ---    Foot Exam 01/19/2024 1/19/2023 (Done)    Override on 1/19/2023: Done    Override on 1/30/2019: Done    Hemoglobin A1c 01/31/2024 7/31/2023    Override on 11/7/2017: Done ([facility] 7[facility]6 [facility])    Mammogram 03/09/2024 3/9/2023    Override on 11/16/2018: Done (mammogram ej)    Override on 8/14/2017: Done (WNL -  repeat 1 yr - @ [facility] (Dr. Guerrier) - IN MEDIA)    Diabetes Urine Screening 04/28/2024 4/28/2023    Low Dose Statin 11/03/2024 11/3/2023    DEXA Scan 05/25/2025 5/25/2022    Override on 11/16/2018: Done              ASSESSMENT     68 y.o. female with     1. Right arm pain    2. Severe episode of recurrent major depressive disorder, without psychotic features        PLAN:     1. Right arm pain  - X-Ray Elbow 2 Views Right; Future  - X-Ray Shoulder Trauma 3 view Right; Future  - diclofenac sodium (VOLTAREN) 1 % Gel; Apply 2 g topically 4 (four) times daily.  Dispense: 100 g; Refill: 5  - Given list of OTC pain medications she can try including Tylenol, Salonpas patches, capsaicin cream, ice and heat.       2. Severe episode of recurrent major depressive disorder, without psychotic features  - Trial of Zoloft.   - Counseled patient on safe and effective use of new medication, including common adverse effects. Patient verbalized understanding.   - Follow up with talk therapist.   - Follow up with me in 6 weeks to assess response.   - sertraline (ZOLOFT) 25 MG tablet; Take 1 tablet (25 mg total) by mouth once daily.  Dispense: 30 tablet; Refill: 11        RTC in 6 months       Carrington Lao MD  Family Medicine  [facility]  11/14/2023    This document was created using voice recognition software (M*Modal Fluency Direct). Although it may be edited, this document may contain errors related to incorrect recognition of the spoken word. Please call the physician if clarification is needed.       No follow-ups on file.

## 2023-11-20 DIAGNOSIS — E11.65 UNCONTROLLED TYPE 2 DIABETES MELLITUS WITH HYPERGLYCEMIA, WITHOUT LONG-TERM CURRENT USE OF INSULIN: ICD-10-CM

## 2023-11-20 RX ORDER — SEMAGLUTIDE 0.68 MG/ML
0.5 INJECTION, SOLUTION SUBCUTANEOUS
Qty: 1 EACH | Refills: 0 | Status: SHIPPED | OUTPATIENT
Start: 2023-11-20 | End: 2023-12-08

## 2023-11-20 NOTE — TELEPHONE ENCOUNTER
----- Message from Landy Lorenzana sent at 11/20/2023 11:07 AM CST -----  Type: Patient Call Back    Who called:Self    What is the request in detail:Pt only wants one blood sugar diagnostic (ONETOUCH VERIO TEST STRIPS) Strp , instead of 7, due to pricing    Can the clinic reply by [facility]?No    Would the patient rather a call back or a response via [facility]? Call    Best call back number:.[phone]      Additional Information:

## 2023-12-01 ENCOUNTER — LAB VISIT (OUTPATIENT)
Dept: LAB | Facility: HOSPITAL | Age: 68
End: 2023-12-01
Attending: HOSPITALIST
Payer: MEDICARE

## 2023-12-01 DIAGNOSIS — E11.9 CONTROLLED TYPE 2 DIABETES MELLITUS WITHOUT COMPLICATION, WITHOUT LONG-TERM CURRENT USE OF INSULIN: ICD-10-CM

## 2023-12-01 LAB
ALBUMIN SERPL BCP-MCNC: 3.7 G/DL (ref 3.5–5.2)
ANION GAP SERPL CALC-SCNC: 11 MMOL/L (ref 8–16)
BUN SERPL-MCNC: 23 MG/DL (ref 8–23)
CALCIUM SERPL-MCNC: 9.6 MG/DL (ref 8.7–10.5)
CHLORIDE SERPL-SCNC: 105 MMOL/L (ref 95–110)
CO2 SERPL-SCNC: 27 MMOL/L (ref 23–29)
CREAT SERPL-MCNC: 1 MG/DL (ref 0.5–1.4)
EST. GFR  (NO RACE VARIABLE): >60 ML/MIN/1.73 M^2
ESTIMATED AVG GLUCOSE: 146 MG/DL (ref 68–131)
GLUCOSE SERPL-MCNC: 105 MG/DL (ref 70–110)
HBA1C MFR BLD: 6.7 % (ref 4–5.6)
PHOSPHATE SERPL-MCNC: 3.3 MG/DL (ref 2.7–4.5)
POTASSIUM SERPL-SCNC: 3.8 MMOL/L (ref 3.5–5.1)
SODIUM SERPL-SCNC: 143 MMOL/L (ref 136–145)

## 2023-12-01 PROCEDURE — 36415 COLL VENOUS BLD VENIPUNCTURE: CPT | Performed by: HOSPITALIST

## 2023-12-01 PROCEDURE — 83036 HEMOGLOBIN GLYCOSYLATED A1C: CPT | Performed by: HOSPITALIST

## 2023-12-01 PROCEDURE — 80069 RENAL FUNCTION PANEL: CPT | Performed by: HOSPITALIST

## 2023-12-08 ENCOUNTER — TELEPHONE (OUTPATIENT)
Dept: PHARMACY | Facility: CLINIC | Age: 68
End: 2023-12-08
Payer: MEDICARE

## 2023-12-08 ENCOUNTER — OFFICE VISIT (OUTPATIENT)
Dept: ENDOCRINOLOGY | Facility: CLINIC | Age: 68
End: 2023-12-08
Payer: MEDICARE

## 2023-12-08 VITALS
HEART RATE: 64 BPM | DIASTOLIC BLOOD PRESSURE: 77 MMHG | SYSTOLIC BLOOD PRESSURE: 145 MMHG | BODY MASS INDEX: 36.19 KG/M2 | WEIGHT: 224.19 LBS | TEMPERATURE: 98 F

## 2023-12-08 DIAGNOSIS — E78.00 HYPERCHOLESTEROLEMIA: ICD-10-CM

## 2023-12-08 DIAGNOSIS — E11.69 DIABETES MELLITUS TYPE 2 IN OBESE: ICD-10-CM

## 2023-12-08 DIAGNOSIS — E11.9 CONTROLLED TYPE 2 DIABETES MELLITUS WITHOUT COMPLICATION, WITHOUT LONG-TERM CURRENT USE OF INSULIN: ICD-10-CM

## 2023-12-08 DIAGNOSIS — E66.9 DIABETES MELLITUS TYPE 2 IN OBESE: ICD-10-CM

## 2023-12-08 DIAGNOSIS — E11.65 TYPE 2 DIABETES MELLITUS WITH HYPERGLYCEMIA, WITHOUT LONG-TERM CURRENT USE OF INSULIN: Primary | ICD-10-CM

## 2023-12-08 DIAGNOSIS — E11.65 UNCONTROLLED TYPE 2 DIABETES MELLITUS WITH HYPERGLYCEMIA, WITHOUT LONG-TERM CURRENT USE OF INSULIN: ICD-10-CM

## 2023-12-08 PROCEDURE — 3077F SYST BP >= 140 MM HG: CPT | Mod: CPTII,S$GLB,, | Performed by: HOSPITALIST

## 2023-12-08 PROCEDURE — 3078F PR MOST RECENT DIASTOLIC BLOOD PRESSURE < 80 MM HG: ICD-10-PCS | Mod: CPTII,S$GLB,, | Performed by: HOSPITALIST

## 2023-12-08 PROCEDURE — 4010F ACE/ARB THERAPY RXD/TAKEN: CPT | Mod: CPTII,S$GLB,, | Performed by: HOSPITALIST

## 2023-12-08 PROCEDURE — 3061F NEG MICROALBUMINURIA REV: CPT | Mod: CPTII,S$GLB,, | Performed by: HOSPITALIST

## 2023-12-08 PROCEDURE — 99999 PR PBB SHADOW E&M-EST. PATIENT-LVL IV: CPT | Mod: PBBFAC,,, | Performed by: HOSPITALIST

## 2023-12-08 PROCEDURE — 3077F PR MOST RECENT SYSTOLIC BLOOD PRESSURE >= 140 MM HG: ICD-10-PCS | Mod: CPTII,S$GLB,, | Performed by: HOSPITALIST

## 2023-12-08 PROCEDURE — 1101F PT FALLS ASSESS-DOCD LE1/YR: CPT | Mod: CPTII,S$GLB,, | Performed by: HOSPITALIST

## 2023-12-08 PROCEDURE — 3072F LOW RISK FOR RETINOPATHY: CPT | Mod: CPTII,S$GLB,, | Performed by: HOSPITALIST

## 2023-12-08 PROCEDURE — 4010F PR ACE/ARB THEARPY RXD/TAKEN: ICD-10-PCS | Mod: CPTII,S$GLB,, | Performed by: HOSPITALIST

## 2023-12-08 PROCEDURE — 3044F HG A1C LEVEL LT 7.0%: CPT | Mod: CPTII,S$GLB,, | Performed by: HOSPITALIST

## 2023-12-08 PROCEDURE — 3072F PR LOW RISK FOR RETINOPATHY: ICD-10-PCS | Mod: CPTII,S$GLB,, | Performed by: HOSPITALIST

## 2023-12-08 PROCEDURE — 3288F FALL RISK ASSESSMENT DOCD: CPT | Mod: CPTII,S$GLB,, | Performed by: HOSPITALIST

## 2023-12-08 PROCEDURE — 99214 OFFICE O/P EST MOD 30 MIN: CPT | Mod: S$GLB,,, | Performed by: HOSPITALIST

## 2023-12-08 PROCEDURE — 1159F PR MEDICATION LIST DOCUMENTED IN MEDICAL RECORD: ICD-10-PCS | Mod: CPTII,S$GLB,, | Performed by: HOSPITALIST

## 2023-12-08 PROCEDURE — 3061F PR NEG MICROALBUMINURIA RESULT DOCUMENTED/REVIEW: ICD-10-PCS | Mod: CPTII,S$GLB,, | Performed by: HOSPITALIST

## 2023-12-08 PROCEDURE — 1101F PR PT FALLS ASSESS DOC 0-1 FALLS W/OUT INJ PAST YR: ICD-10-PCS | Mod: CPTII,S$GLB,, | Performed by: HOSPITALIST

## 2023-12-08 PROCEDURE — 3066F PR DOCUMENTATION OF TREATMENT FOR NEPHROPATHY: ICD-10-PCS | Mod: CPTII,S$GLB,, | Performed by: HOSPITALIST

## 2023-12-08 PROCEDURE — 3044F PR MOST RECENT HEMOGLOBIN A1C LEVEL <7.0%: ICD-10-PCS | Mod: CPTII,S$GLB,, | Performed by: HOSPITALIST

## 2023-12-08 PROCEDURE — 3288F PR FALLS RISK ASSESSMENT DOCUMENTED: ICD-10-PCS | Mod: CPTII,S$GLB,, | Performed by: HOSPITALIST

## 2023-12-08 PROCEDURE — 99214 PR OFFICE/OUTPT VISIT, EST, LEVL IV, 30-39 MIN: ICD-10-PCS | Mod: S$GLB,,, | Performed by: HOSPITALIST

## 2023-12-08 PROCEDURE — 99999 PR PBB SHADOW E&M-EST. PATIENT-LVL IV: ICD-10-PCS | Mod: PBBFAC,,, | Performed by: HOSPITALIST

## 2023-12-08 PROCEDURE — 3008F PR BODY MASS INDEX (BMI) DOCUMENTED: ICD-10-PCS | Mod: CPTII,S$GLB,, | Performed by: HOSPITALIST

## 2023-12-08 PROCEDURE — 1159F MED LIST DOCD IN RCRD: CPT | Mod: CPTII,S$GLB,, | Performed by: HOSPITALIST

## 2023-12-08 PROCEDURE — 3008F BODY MASS INDEX DOCD: CPT | Mod: CPTII,S$GLB,, | Performed by: HOSPITALIST

## 2023-12-08 PROCEDURE — 3078F DIAST BP <80 MM HG: CPT | Mod: CPTII,S$GLB,, | Performed by: HOSPITALIST

## 2023-12-08 PROCEDURE — 3066F NEPHROPATHY DOC TX: CPT | Mod: CPTII,S$GLB,, | Performed by: HOSPITALIST

## 2023-12-08 RX ORDER — SEMAGLUTIDE 1.34 MG/ML
1 INJECTION, SOLUTION SUBCUTANEOUS
Qty: 3 ML | Refills: 11 | Status: SHIPPED | OUTPATIENT
Start: 2024-01-01 | End: 2024-03-07

## 2023-12-08 RX ORDER — GLIPIZIDE AND METFORMIN HCL 5; 500 MG/1; MG/1
1 TABLET, FILM COATED ORAL
Qty: 180 TABLET | Refills: 3 | Status: SHIPPED | OUTPATIENT
Start: 2023-12-08 | End: 2024-12-07

## 2023-12-08 NOTE — ASSESSMENT & PLAN NOTE
- Diabetes is at goal, given most current A1C, and recent hospital admission for hyperglycemia  - Goal A1C for patient is 7%  - diabetes is complicated by dietary indiscretion, poor insight to diabetes> doing better  - Diabetic supplies/medications were reviewed this visit to ensure continue steady supplies  - Advised patient to get routine feet care, routine eye exam, plus routine maintenance screening  - Diabetes goal including glucose glucose and A1C goals discussed  - weight loss noted    Plan  - Adjustment made:  increase Ozempic 1.0 mg once a week injection, Pharmacy Assistance to help with cost of Ozempic  - Continue glipizide/metformin combination pill, 5/500 mg 1 tablet twice a day, monitor for hypoglycemia  - Encouragement of dietary modification, portion size control, decreasing carbohydrates intake  - Encouragement of dietary changes and weight loss  - Advised pt to check glucoses regularly, asked to filled glucose log and bring back for review at next office visit  - check lab work in 4 months re-evaluate  - Clear written instruction given on AVS. Follow up as scheduled

## 2023-12-08 NOTE — PROGRESS NOTES
Assessment/Plan:    Leg cramps  - Will check labs  - Increase oral hydration    - Will continue to follow up  Other hyperlipidemia  - Discussed healthy diet and regular exercise  - Will obtain updated fasting lipid panel  Severe persistent asthma without complication  - Stable  - Continue prescribed inhalers and routine follow up with Pulmonary    - Will continue to monitor  Diagnoses and all orders for this visit:    Leg cramps  -     Magnesium; Future    Other fatigue  -     TSH, 3rd generation with Free T4 reflex; Future  -     CBC and differential; Future  -     Comprehensive metabolic panel; Future    Lipid screening  -     Lipid Panel with Direct LDL reflex; Future    Other hyperlipidemia    Severe persistent asthma without complication        Subjective:      Patient ID: Axel Mccartney is a 50 y o  female  Patient presents today with complaints of fatigue and leg cramps that started 4 days ago  She stated the cramps started in her feet and have been working their way up her legs  She also feels like her thyroid might be off but she had difficulty getting her Endocrinologist to order blood work for her  She is still dealing with shortness of breath, cough, and fever  She continues to follow up with many specialists including Pulmonary  The following portions of the patient's history were reviewed and updated as appropriate: allergies, current medications, past family history, past medical history, past social history, past surgical history and problem list     Review of Systems   Constitutional: Positive for fatigue  Negative for fever  HENT: Negative for trouble swallowing  Eyes: Negative for visual disturbance  Respiratory: Positive for cough and shortness of breath  Cardiovascular: Negative for chest pain and palpitations  Gastrointestinal: Negative for abdominal pain and blood in stool  Endocrine: Negative for cold intolerance and heat intolerance  Subjective:      Patient ID: Maria Alejandra De La Rosa is a 68 y.o. female presented to [facility] on 12/8/2023.  Chief Complaint:  Diabetes    History of Present Illness: Maria Alejandra De La Rosa is a 68 y.o. female here for  type 2 diabetes  Other significant past medical history:  Obesity, hypertension    Diabetes mellitus Type 2  - Diagnosed w/ DM: in 2014  - Diabetes Education: No  - Patient with recent hospital admission 1/6/2023 to 1/8/2023 for uncontrolled hyperglycemia and possible right lower lobe pneumonia.  - Her glucose earlier in clinic was greater than 400 in the last week she has not been monitoring her glucose, she does not take insulin. ED workup is notable for mild leukocytosis hypokalemia, ISAI is present, hyperglycemic workup is negative for serum ketones and patient has a non acidotic pH.  A1c found to be 13.0%.    Interval history:  Patient is here for diabetes management A1c 6.7%.  Patient has been making dietary changes at home. A1c improvement  Weight loss 222> previous weight 231  Doing well.  Ozempic is expensive donut hole.  Requesting pharmacy assistant      Current reported meds:    Ozempic 0.5 mg once a week injection  Glipizide/metformin 5/500 mg 1 tablet twice a day  Previous meds tried:              Trulicity >> too expensive, last used 6/2022              Glimepiride 4mg daily   Januvia 50mg daily  Home glucose checks: checks daily , Logs reviewed  - no obvious hypoglycemia.  Glucose trend much better over the last month  108  115  104  93  92  132    Diet/Exercise:   - Eating 3x meals per day, eating mostly preprepared TV dinner              - Drink: water, crystral light  Sleeping:   - Any difficulty falling asleep, staying asleep, nighttime coughing, or partner complaints of snoring?  No  - What time do you usually go to bed? What time do you usually wake up? 10-6  Weight trend: stable  Diabetes Related Hospitalization:  No  Hx of pancreatitis: No, denies  Family history of  "diabetes: Yes  Occupation:  Retired    Eye exam current (within one year): yes, DR: no, unknown  Reports cuts or ulcers on feet:   Denies, has podiatrist  Statin: Taking, ACE/ARB: Taking    Diabetes lab work  Lab Results   Component Value Date    HGBA1C 6.7 (H) 12/01/2023    HGBA1C 7.1 (H) 07/31/2023    HGBA1C 8.8 (H) 04/28/2023    HGBA1C 13.0 (H) 01/07/2023     Lab Results   Component Value Date    CPEPTIDE 2.86 07/31/2023      No results found for: "FRUCTOSAMINE"  Lab Results   Component Value Date    MICALBCREAT Unable to calculate 04/28/2023     No results found for: "JGSIIOEK08"    Diabetes Management Status: Reviewed this office visit  Screening or Prevention Patient's value Goal Complete/Controlled?   Lipid profile : 05/24/2022 Annually Yes     Dilated retinal exam : 10/11/2022 Annually Yes     Foot exam   : 01/19/2023 Annually Yes        Reviewed past surgical, medical, family, social history and updated as appropriate.  Review of Systems: see HPI above  Objective:   BP (!) 145/77   Pulse 64   Temp 98.1 °F (36.7 °C) (Oral)   Wt 101.7 kg (224 lb 3.2 oz)   BMI 36.19 kg/m²   Body mass index is 36.19 kg/m².  Vital signs reviewed    Physical Exam  Vitals and nursing note reviewed.   Constitutional:       Appearance: Normal appearance. She is well-developed. She is obese. She is not ill-appearing.   Neck:      Thyroid: No thyromegaly.   Pulmonary:      Effort: Pulmonary effort is normal. No respiratory distress.   Musculoskeletal:         General: Normal range of motion.      Cervical back: Normal range of motion.   Neurological:      General: No focal deficit present.      Mental Status: She is alert. Mental status is at baseline.   Psychiatric:         Mood and Affect: Mood normal.         Behavior: Behavior normal.       Lab Reviewed:  See results in subjective  Lab Results   Component Value Date    HGBA1C 6.7 (H) 12/01/2023     Lab Results   Component Value Date    CHOL 99 (L) 05/24/2022    HDL 49 " Genitourinary: Negative for difficulty urinating and dysuria  Musculoskeletal: Positive for myalgias (muscle cramps in legs)  Negative for gait problem  Skin: Negative for rash  Neurological: Negative for dizziness, syncope and headaches  Hematological: Negative for adenopathy  Psychiatric/Behavioral: Negative for behavioral problems  Objective:      /82 (BP Location: Right arm, Patient Position: Sitting, Cuff Size: Adult)   Pulse 93   Temp 99 9 °F (37 7 °C) (Tympanic)   Resp 14   Ht 5' 6" (1 676 m)   Wt 64 8 kg (142 lb 12 8 oz)   SpO2 99%   BMI 23 05 kg/m²          Physical Exam  Vitals and nursing note reviewed  Constitutional:       Appearance: Normal appearance  HENT:      Head: Normocephalic and atraumatic  Right Ear: External ear normal       Left Ear: External ear normal    Eyes:      Conjunctiva/sclera: Conjunctivae normal    Cardiovascular:      Rate and Rhythm: Normal rate and regular rhythm  Heart sounds: Normal heart sounds  Pulmonary:      Effort: Pulmonary effort is normal       Breath sounds: Normal breath sounds  Musculoskeletal:         General: Normal range of motion  Cervical back: Normal range of motion  Skin:     General: Skin is warm and dry  Neurological:      Mental Status: She is alert and oriented to person, place, and time  Cranial Nerves: No cranial nerve deficit     Psychiatric:         Mood and Affect: Mood normal          Behavior: Behavior normal  05/24/2022    LDLCALC 39.0 (L) 05/24/2022    TRIG 55 05/24/2022    CHOLHDL 49.5 05/24/2022     Lab Results   Component Value Date     12/01/2023    K 3.8 12/01/2023     12/01/2023    CO2 27 12/01/2023     12/01/2023    BUN 23 12/01/2023    CREATININE 1.0 12/01/2023    CALCIUM 9.6 12/01/2023    PHOS 3.3 12/01/2023    PROT 6.9 01/06/2023    ALBUMIN 3.7 12/01/2023    BILITOT 1.6 (H) 01/06/2023    ALKPHOS 129 01/06/2023    AST 19 01/06/2023    ALT 19 01/06/2023    ANIONGAP 11 12/01/2023    ESTGFRAFRICA >60.0 05/24/2022    EGFRNONAA >60.0 05/24/2022    TSH 3.670 05/24/2022     Assessment     1. Type 2 diabetes mellitus with hyperglycemia, without long-term current use of insulin  glipizide-metformin (METAGLIP) 5-500 mg per tablet    semaglutide (OZEMPIC) 1 mg/dose (4 mg/3 mL)    HEMOGLOBIN A1C    RENAL FUNCTION PANEL    Ambulatory referral/consult to Pharmacy Assistance      2. Uncontrolled type 2 diabetes mellitus with hyperglycemia, without long-term current use of insulin        3. Diabetes mellitus type 2 in obese  blood sugar diagnostic (ONETOUCH VERIO TEST STRIPS) Strp      4. Controlled type 2 diabetes mellitus without complication, without long-term current use of insulin        5. Hypercholesterolemia            Plan     Controlled type 2 diabetes mellitus without complication, without long-term current use of insulin  - Diabetes is at goal, given most current A1C, and recent hospital admission for hyperglycemia  - Goal A1C for patient is 7%  - diabetes is complicated by dietary indiscretion, poor insight to diabetes> doing better  - Diabetic supplies/medications were reviewed this visit to ensure continue steady supplies  - Advised patient to get routine feet care, routine eye exam, plus routine maintenance screening  - Diabetes goal including glucose glucose and A1C goals discussed  - weight loss noted    Plan  - Adjustment made:  increase Ozempic 1.0 mg once a week injection, Pharmacy  Assistance to help with cost of Ozempic  - Continue glipizide/metformin combination pill, 5/500 mg 1 tablet twice a day, monitor for hypoglycemia  - Encouragement of dietary modification, portion size control, decreasing carbohydrates intake  - Encouragement of dietary changes and weight loss  - Advised pt to check glucoses regularly, asked to filled glucose log and bring back for review at next office visit  - check lab work in 4 months re-evaluate  - Clear written instruction given on AVS. Follow up as scheduled    Diabetes mellitus type 2 in obese  - Body mass index is 36.19 kg/m².  - dietary discussion as above  - encourage to continue weight loss    Hypercholesterolemia  - lipid panel review today  - ASCVD Risk below: Statin: Taking  The ASCVD Risk score (Tea DK, et al., 2019) failed to calculate for the following reasons:    The valid total cholesterol range is 130 to 320 mg/dL      Advised patient to follow up with PCP for routine health maintenance care.   RTC in 3-4 months    Anmol Baugh M.D.  Endocrinology  [facility]  12/8/2023      Disclaimer: This note has been generated in part with the use of voice-recognition software. There may be typographical errors that have been missed during proof-reading.

## 2023-12-08 NOTE — LETTER
December 8, 2023    Maria Alejandra De La Rosa  [address] [place] [address]             [facility] - Pharmacy Assistance  [address]  [address]  [facility] [address]  Phone: [phone]  Fax: [phone]   Dear Ms. De La Rosa,    My Name is Moustapha Lorenzana. I am a Pharmacy Technician reaching out on behalf of [facility]s Pharmacy Patient Assistance Team after receiving a referral from your provider inquiring about assistance with your medications. I tried to call you on 12/8/2023 but was unable to reach you. Our goal is to assist qualified patients with financial assistance for their medications to better help you achieve your health goals!    Please note that enrollment and eligibility into available support may require the following documents:    Proof of household Income( such as social security statement, 1099 form, pension statement or 3 consecutive pay stubs  Copy of all insurance cards (front and back)  Print out from your insurance or pharmacy showing how much you have spent on prescriptions this year  Signed and dated HIPAA /Patient Information Forms   (These forms will be sent to you once you contact us)     Please reach out to my phone number below if you are still in need of assistance with your medications. We will attempt to reach out to you through [employer] or via phone call again in 5 business days. We look forward to hearing from you soon!    Thank you for choosing [facility] for your healthcare needs    Sincerely  Moustapha Lorenzana

## 2023-12-08 NOTE — LETTER
January 15, 2024    Maria Alejandra De La Rosa  [address] [place] [address]             [facility] - Pharmacy Assistance  [address]  [address]  [facility] [address]  Phone: [phone]  Fax: [phone] Dear Ms. De La Rosa    My Name is Moustapha Lorenzana. I am a Pharmacy Technician reaching out on behalf of [employer] [facility]acy Patient Assistance Team. We last spoke on 12/28/23 about assistance with your medication. A letter was sent to your [facility] Portal requesting documentation required to begin the Pharmacy Assistance Process. Unfortunately, we have not heard back from you. Please reach out to my phone number below if you are still in need of assistance with your medications. We look forward to hearing from you soon!     Thank you for choosing [facility] for your healthcare needs.      Moustapha Lorenzana  Pharmacy Patient Assistance

## 2023-12-08 NOTE — TELEPHONE ENCOUNTER
I have reached out to Maria Alejandra De La Rosa to inform her of the [employer] application process for Ozempic and whats required to apply.  Maria Alejandra De La Rosa did not answer. I left a voicemail and mailed a letter introducing her to the pharmacy patient assistance program. I will follow up in 5 business days.

## 2023-12-08 NOTE — LETTER
December 28, 2023    Maria Alejandra De La Rosa  [address] [place] [address]             [facility]acy Assistance  Pharmacy Patient Assistance  [address]  [place] [address]  Phone: [phone]  Fax: [phone]  Email: [email or web address]  Dear Ms. Maria Alejandra De La Rosa     It was a pleasure speaking with you. To follow up on our conversation on 12/28/2023, the Pharmacy Patient Assistance Program needs more information from you before we can submit your Ozempic application to the [employer] Program. Please return the following documents to the Pharmacy Patient Assistance office ASAP.  Fax requested documentation to [phone] or email [email or web address]. Documentation can also be mailed to address listed below       Proof of household Income( such as social security statement, 1099 form, pension statement or 3 consecutive pay stubs, Copy of all Insurance cards( front and back), and Signed and dated HIPAA /Patient Information Forms              Whats Next:     Once I receive your documentation and authorization from your Provider, your application will be submitted to the [employer] Program for review. Please be advised it will take 2 to 4 weeks for your application to be processed so you may have to purchase a month's supply of medication from your pharmacy to hold you over during the waiting period. You will be notified of approval or denial by The Program(mail) or myself.      If you have any questions or concerns, please give me a call         Sincerely   Moustapha Lorenzana

## 2023-12-08 NOTE — PATIENT INSTRUCTIONS
Glipizide/Metformin 5/500mg 1 pill  one a day once increase to 1 mg of Ozempic    Ozempic 1 mg once a week injection start in 1/1/2024    Please call: [facility] pharmacy patient assistant to start the paperwork for getting your OZEMPIC, If they did not call you in 2 weeks, [phone]       Goal blood sugar in the morning, before breakfast:   Glucose goal AFTER MEALS:    2 Hour after: less than 140  Before going to bed: 100-140  Do not go to bed with glucose less than 100  Have a small snack if glucose is lower than 100      Please check glucose 1x times a day (before breakfast).   You can keep track of the glucose with Glucose logs or any papers  Please filled out and bring back to next office visit for me to review  Document any (LOW BLOOD GLUCOSE) hypoglycemia  episode with date and time for me to review    We will plan an in-clinic visit in 4 months, with labs prior to that appointment.    Contact information:  [facility] [address] [facility] [address], [place] [address]    Office:  [phone]  Fax:  [phone]     ----------------------------------------------------------    Anmol Baugh M.D  [facility] [address] [facility] [address], [place] [address]    Office:  [phone]  Fax:  [phone]

## 2023-12-08 NOTE — ASSESSMENT & PLAN NOTE
- Body mass index is 36.19 kg/m².  - dietary discussion as above  - encourage to continue weight loss

## 2023-12-19 DIAGNOSIS — E11.69 DIABETES MELLITUS TYPE 2 IN OBESE: ICD-10-CM

## 2023-12-19 DIAGNOSIS — E66.9 DIABETES MELLITUS TYPE 2 IN OBESE: ICD-10-CM

## 2023-12-19 RX ORDER — CALCIUM CITRATE/VITAMIN D3 200MG-6.25
TABLET ORAL
Qty: 400 STRIP | Refills: 3 | Status: SHIPPED | OUTPATIENT
Start: 2023-12-19

## 2023-12-19 NOTE — TELEPHONE ENCOUNTER
Please see the attached refill request.  
no gas in urine/no flank pain L/no hematuria/no incontinence/no dysuria/no bladder infections/no flank pain R/no urine discoloration/no renal colic

## 2023-12-21 NOTE — TELEPHONE ENCOUNTER
A 2nd attempt has been made to establish contact with Maria Alejandra De La Rosa  via MY CHART. The final contact attempt will be made in 5 business days

## 2023-12-28 NOTE — TELEPHONE ENCOUNTER
I have spoken with Maria Alejandra De La Rosa and informed her of the [employer] application process for Ozempic and what's required to apply.  Maria Alejandra De La Rosa will provide the following documents: Proof of household Income( such as social security statement, 1099 form, pension statement or 3 consecutive pay stubs, Copy of all Insurance cards( front and back), and Signed and dated HIPAA /Patient Information Forms        I will follow up with the patient in 5 business days.

## 2024-01-03 ENCOUNTER — HOSPITAL ENCOUNTER (EMERGENCY)
Facility: HOSPITAL | Age: 69
Discharge: HOME | End: 2024-01-03
Attending: EMERGENCY MEDICINE
Payer: MEDICARE

## 2024-01-03 VITALS
DIASTOLIC BLOOD PRESSURE: 70 MMHG | BODY MASS INDEX: 36 KG/M2 | WEIGHT: 224 LBS | SYSTOLIC BLOOD PRESSURE: 154 MMHG | OXYGEN SATURATION: 98 % | RESPIRATION RATE: 18 BRPM | HEART RATE: 75 BPM | TEMPERATURE: 99 F | HEIGHT: 66 IN

## 2024-01-03 DIAGNOSIS — R10.9 ABDOMINAL PAIN: ICD-10-CM

## 2024-01-03 DIAGNOSIS — N20.0 NEPHROLITHIASIS: Primary | ICD-10-CM

## 2024-01-03 LAB
ALBUMIN SERPL BCP-MCNC: 3.8 G/DL (ref 3.5–5.2)
ALP SERPL-CCNC: 108 U/L (ref 55–135)
ALT SERPL W/O P-5'-P-CCNC: 20 U/L (ref 10–44)
ANION GAP SERPL CALC-SCNC: 13 MMOL/L (ref 8–16)
AST SERPL-CCNC: 19 U/L (ref 10–40)
BASOPHILS # BLD AUTO: 0.03 K/UL (ref 0–0.2)
BASOPHILS NFR BLD: 0.2 % (ref 0–1.9)
BILIRUB SERPL-MCNC: 0.8 MG/DL (ref 0.1–1)
BILIRUB UR QL STRIP: NEGATIVE
BUN SERPL-MCNC: 24 MG/DL (ref 8–23)
CALCIUM SERPL-MCNC: 9.9 MG/DL (ref 8.7–10.5)
CHLORIDE SERPL-SCNC: 103 MMOL/L (ref 95–110)
CLARITY UR REFRACT.AUTO: CLEAR
CO2 SERPL-SCNC: 24 MMOL/L (ref 23–29)
COLOR UR AUTO: YELLOW
CREAT SERPL-MCNC: 1.2 MG/DL (ref 0.5–1.4)
DIFFERENTIAL METHOD BLD: ABNORMAL
EOSINOPHIL # BLD AUTO: 0.2 K/UL (ref 0–0.5)
EOSINOPHIL NFR BLD: 1.5 % (ref 0–8)
ERYTHROCYTE [DISTWIDTH] IN BLOOD BY AUTOMATED COUNT: 12.8 % (ref 11.5–14.5)
EST. GFR  (NO RACE VARIABLE): 48.7 ML/MIN/1.73 M^2
GLUCOSE SERPL-MCNC: 203 MG/DL (ref 70–110)
GLUCOSE UR QL STRIP: NEGATIVE
HCT VFR BLD AUTO: 39.3 % (ref 37–48.5)
HGB BLD-MCNC: 13.2 G/DL (ref 12–16)
HGB UR QL STRIP: ABNORMAL
IMM GRANULOCYTES # BLD AUTO: 0.04 K/UL (ref 0–0.04)
IMM GRANULOCYTES NFR BLD AUTO: 0.3 % (ref 0–0.5)
KETONES UR QL STRIP: NEGATIVE
LEUKOCYTE ESTERASE UR QL STRIP: NEGATIVE
LIPASE SERPL-CCNC: 19 U/L (ref 4–60)
LYMPHOCYTES # BLD AUTO: 1.9 K/UL (ref 1–4.8)
LYMPHOCYTES NFR BLD: 14.8 % (ref 18–48)
MCH RBC QN AUTO: 30.3 PG (ref 27–31)
MCHC RBC AUTO-ENTMCNC: 33.6 G/DL (ref 32–36)
MCV RBC AUTO: 90 FL (ref 82–98)
MICROSCOPIC COMMENT: ABNORMAL
MONOCYTES # BLD AUTO: 0.9 K/UL (ref 0.3–1)
MONOCYTES NFR BLD: 7.3 % (ref 4–15)
NEUTROPHILS # BLD AUTO: 9.7 K/UL (ref 1.8–7.7)
NEUTROPHILS NFR BLD: 75.9 % (ref 38–73)
NITRITE UR QL STRIP: NEGATIVE
NRBC BLD-RTO: 0 /100 WBC
PH UR STRIP: 7 [PH] (ref 5–8)
PLATELET # BLD AUTO: 267 K/UL (ref 150–450)
PMV BLD AUTO: 9.7 FL (ref 9.2–12.9)
POTASSIUM SERPL-SCNC: 4.4 MMOL/L (ref 3.5–5.1)
PROT SERPL-MCNC: 7.1 G/DL (ref 6–8.4)
PROT UR QL STRIP: NEGATIVE
RBC # BLD AUTO: 4.35 M/UL (ref 4–5.4)
RBC #/AREA URNS AUTO: >100 /HPF (ref 0–4)
SODIUM SERPL-SCNC: 140 MMOL/L (ref 136–145)
SP GR UR STRIP: 1.02 (ref 1–1.03)
SQUAMOUS #/AREA URNS AUTO: 0 /HPF
URN SPEC COLLECT METH UR: ABNORMAL
WBC # BLD AUTO: 12.75 K/UL (ref 3.9–12.7)
WBC #/AREA URNS AUTO: 1 /HPF (ref 0–5)

## 2024-01-03 PROCEDURE — 25500020 PHARM REV CODE 255: Performed by: EMERGENCY MEDICINE

## 2024-01-03 PROCEDURE — 99285 EMERGENCY DEPT VISIT HI MDM: CPT | Mod: 25

## 2024-01-03 PROCEDURE — 96361 HYDRATE IV INFUSION ADD-ON: CPT

## 2024-01-03 PROCEDURE — 80053 COMPREHEN METABOLIC PANEL: CPT

## 2024-01-03 PROCEDURE — 93005 ELECTROCARDIOGRAM TRACING: CPT

## 2024-01-03 PROCEDURE — 96375 TX/PRO/DX INJ NEW DRUG ADDON: CPT

## 2024-01-03 PROCEDURE — 96374 THER/PROPH/DIAG INJ IV PUSH: CPT

## 2024-01-03 PROCEDURE — 63600175 PHARM REV CODE 636 W HCPCS

## 2024-01-03 PROCEDURE — 85025 COMPLETE CBC W/AUTO DIFF WBC: CPT

## 2024-01-03 PROCEDURE — 93010 ELECTROCARDIOGRAM REPORT: CPT | Mod: 76,,, | Performed by: INTERNAL MEDICINE

## 2024-01-03 PROCEDURE — 81001 URINALYSIS AUTO W/SCOPE: CPT

## 2024-01-03 PROCEDURE — 93010 ELECTROCARDIOGRAM REPORT: CPT | Mod: ,,, | Performed by: INTERNAL MEDICINE

## 2024-01-03 PROCEDURE — 25000003 PHARM REV CODE 250

## 2024-01-03 PROCEDURE — 83690 ASSAY OF LIPASE: CPT

## 2024-01-03 RX ORDER — ONDANSETRON 2 MG/ML
4 INJECTION INTRAMUSCULAR; INTRAVENOUS
Status: COMPLETED | OUTPATIENT
Start: 2024-01-03 | End: 2024-01-03

## 2024-01-03 RX ORDER — ONDANSETRON 4 MG/1
4 TABLET, FILM COATED ORAL EVERY 6 HOURS
Qty: 12 TABLET | Refills: 0 | Status: SHIPPED | OUTPATIENT
Start: 2024-01-03

## 2024-01-03 RX ORDER — OXYCODONE AND ACETAMINOPHEN 5; 325 MG/1; MG/1
2 TABLET ORAL EVERY 6 HOURS PRN
Qty: 12 TABLET | Refills: 0 | Status: SHIPPED | OUTPATIENT
Start: 2024-01-03

## 2024-01-03 RX ORDER — MORPHINE SULFATE 4 MG/ML
4 INJECTION, SOLUTION INTRAMUSCULAR; INTRAVENOUS
Status: COMPLETED | OUTPATIENT
Start: 2024-01-03 | End: 2024-01-03

## 2024-01-03 RX ADMIN — IOHEXOL 100 ML: 350 INJECTION, SOLUTION INTRAVENOUS at 08:01

## 2024-01-03 RX ADMIN — SODIUM CHLORIDE 1000 ML: 9 INJECTION, SOLUTION INTRAVENOUS at 07:01

## 2024-01-03 RX ADMIN — ONDANSETRON 4 MG: 2 INJECTION INTRAMUSCULAR; INTRAVENOUS at 07:01

## 2024-01-03 RX ADMIN — MORPHINE SULFATE 4 MG: 4 INJECTION INTRAVENOUS at 07:01

## 2024-01-03 NOTE — ED TRIAGE NOTES
68 year old female presents to the ED c/o diarrhea, vomiting and abdominal pain that began suddenly this morning

## 2024-01-03 NOTE — ED PROVIDER NOTES
Encounter Date: 1/3/2024       History     Chief Complaint   Patient presents with    Emesis     Pt c/o N/V/D and lower abdominal pain since 0500     HPI    Maria Alejandra De La Rosa is a 68 y.o. female w/ a PMHx of HTN, type 2 diabetes.  Patient presents to the ED today for RLQ abdominal pain radiating around her right flank and to the back.  Patient states that she began having episodes of diarrhea and nausea around 04:00 this morning, which was followed by sudden onset of this abdominal pain at aprox. 05:00. Patient denies fevers, dizziness/lightheadedness, syncope, trauma, CP, SOB, dysuria, hematuria.    Review of patient's allergies indicates:   Allergen Reactions    Penicillin Hives    Penicillins      Other reaction(s): Rash    Vicodin  [hydrocodone-acetaminophen]      Other reaction(s): Unknown    Metformin Diarrhea     Diarrhea      Past Medical History:   Diagnosis Date    Cataract     Depression     Diabetes mellitus type 2 in obese 2018    Diabetes mellitus, type 2     DJD (degenerative joint disease)     Dysmetabolic syndrome     Hyperglycemia     Hypertension     Lumbar stenosis     Sleep apnea      Past Surgical History:   Procedure Laterality Date    BACK SURGERY       SECTION      CHOLECYSTECTOMY       Family History   Problem Relation Age of Onset    Cancer Mother         lung cancer    Cataracts Mother     COPD Mother     Cancer Father         lung cancer     Diabetes Maternal Grandmother     No Known Problems Sister     No Known Problems Brother     No Known Problems Maternal Aunt     No Known Problems Maternal Uncle     No Known Problems Paternal Aunt     No Known Problems Paternal Uncle     No Known Problems Maternal Grandfather     No Known Problems Paternal Grandmother     No Known Problems Paternal Grandfather     Amblyopia Neg Hx     Blindness Neg Hx     Glaucoma Neg Hx     Hypertension Neg Hx     Macular degeneration Neg Hx     Retinal detachment Neg Hx     Strabismus Neg Hx     Stroke  Neg Hx     Thyroid disease Neg Hx      Social History     Tobacco Use    Smoking status: Never    Smokeless tobacco: Never   Substance Use Topics    Alcohol use: No     Review of Systems    Physical Exam     Initial Vitals [01/03/24 0557]   BP Pulse Resp Temp SpO2   (!) 215/110 66 20 98.5 °F (36.9 °C) 100 %      MAP       --         Physical Exam    Constitutional: She appears well-developed and well-nourished. She is not diaphoretic. She appears distressed.   HENT:   Head: Normocephalic and atraumatic.   Eyes: EOM are normal. Pupils are equal, round, and reactive to light.   Neck: No JVD present.   Normal range of motion.  Cardiovascular:  Normal rate, regular rhythm, normal heart sounds and intact distal pulses.           Pulmonary/Chest: Breath sounds normal. No respiratory distress.   Abdominal: Abdomen is soft. She exhibits no distension and no mass. There is no abdominal tenderness. There is no rebound.   Musculoskeletal:         General: No tenderness or edema. Normal range of motion.      Cervical back: Normal range of motion.     Neurological: She is alert and oriented to person, place, and time.   Skin: Skin is warm and dry.         ED Course   Procedures  Labs Reviewed   CBC W/ AUTO DIFFERENTIAL - Abnormal; Notable for the following components:       Result Value    WBC 12.75 (*)     Gran # (ANC) 9.7 (*)     Gran % 75.9 (*)     Lymph % 14.8 (*)     All other components within normal limits   COMPREHENSIVE METABOLIC PANEL - Abnormal; Notable for the following components:    Glucose 203 (*)     BUN 24 (*)     eGFR 48.7 (*)     All other components within normal limits   URINALYSIS, REFLEX TO URINE CULTURE - Abnormal; Notable for the following components:    Occult Blood UA 2+ (*)     All other components within normal limits    Narrative:     Specimen Source->Urine   URINALYSIS MICROSCOPIC - Abnormal; Notable for the following components:    RBC, UA >100 (*)     All other components within normal limits     Narrative:     Specimen Source->Urine   LIPASE        ECG Results              EKG 12-lead (Final result)  Result time 01/04/24 00:00:15      Final result by Interface, Lab In [facility] (01/04/24 00:00:15)                   Narrative:    Test Reason : R10.9,    Vent. Rate : 084 BPM     Atrial Rate : 084 BPM     P-R Int : 190 ms          QRS Dur : 072 ms      QT Int : 382 ms       P-R-T Axes : 084 060 044 degrees     QTc Int : 451 ms    Artifact  Normal sinus rhythm  Nonspecific ST and T wave abnormality  Abnormal ECG  When compared with ECG of 03-JAN-2024 06:04,  Current undetermined rhythm precludes rhythm comparison, needs review  Confirmed by ANGELA BONILLA MD ([id number]) on 1/4/2024 12:00:05 AM    Referred By: [email or web address]   SELF           Confirmed By:ANGELA BONILLA MD                                     EKG 12-lead (Final result)  Result time 01/03/24 23:59:25      Final result by Interface, Lab In [facility] (01/03/24 23:59:25)                   Narrative:    Test Reason : R10.9,    Vent. Rate : 062 BPM     Atrial Rate : 062 BPM     P-R Int : 152 ms          QRS Dur : 074 ms      QT Int : 436 ms       P-R-T Axes : 075 052 049 degrees     QTc Int : 442 ms    Normal sinus rhythm  Low voltage QRS  Borderline Abnormal ECG  When compared with ECG of 06-JAN-2023 15:00,  No significant change was found  Confirmed by ANGELA BONILLA MD ([id number]) on 1/3/2024 11:59:17 PM    Referred By: [email or web address]   SELF           Confirmed By:ANGELA BONILLA MD                                  Imaging Results               CT Abdomen Pelvis With IV Contrast NO Oral Contrast (Final result)  Result time 01/03/24 08:29:20      Final result by Jed Field MD (01/03/24 08:29:20)                   Impression:      This report was flagged in Epic as abnormal.    1. Mild right hydroureteronephrosis noting perinephric and periureteral inflammation secondary to a 2-3 mm calculus at the proximal right UVJ.  Correlation with urinalysis recommended to exclude  superimposed infection.  2. Bilateral nonobstructive nephrolithiasis.  3. Findings suggesting hepatic steatosis, correlation with LFTs recommended.  4. Prominence of the common duct noting pneumobilia, correlation with history of prior procedure.  5. Focal dilation of the pancreatic duct at the body/tail noting surgical change of pancreatectomy versus atrophic change from prior pancreatitis.  6. There are a few pulmonary micro nodules within the right lower lobe.  For multiple solid nodules all <6 mm, Fleischner Society 2017 guidelines recommend no routine follow up for a low risk patient, or follow up with non-contrast chest CT at 12 months after discovery in a high risk patient.  7. Please see above for several additional findings.      Electronically signed by: Jed Field MD  Date:    01/03/2024  Time:    08:29               Narrative:    EXAMINATION:  CT ABDOMEN PELVIS WITH IV CONTRAST    CLINICAL HISTORY:  Abdominal pain, acute, nonlocalized;    TECHNIQUE:  Low dose axial images, sagittal and coronal reformations were obtained from the lung bases to the pubic symphysis following the IV administration of 100 mL of Omnipaque 350 .  Oral contrast was not given.    COMPARISON:  None.    FINDINGS:  Images of the lower thorax are remarkable for bilateral dependent atelectasis.  There are a few scattered 2-3 mm pulmonary nodules within the right lower lobe.    The liver is hypoattenuating suggesting steatosis, correlation with LFTs recommended.  The spleen and adrenal glands are unremarkable.  The gallbladder is surgically absent.  There is pneumobilia.  The common duct is dilated measuring up to 1.3 cm without convincing intraluminal filling defect.  There is atrophic change of the pancreas noting scattered pancreatic calcification.  There is dilation of the pancreatic duct primarily within the pancreatic body measuring 6 mm.  There is surgical change of distal pancreatectomy versus atrophy from prior  pancreatitis.  The stomach is decompressed without wall thickening.  The portal vein, splenic vein, SMV, celiac axis and SMA all are patent.  There are a few scattered upper limit of normal caliber abdominal lymph nodes.    The kidneys enhance asymmetrically noting slight delayed enhancement of the right kidney as compared to the left.  No left hydronephrosis.  There are a few punctate foci of low attenuation within the interpolar region of the left kidney, too small for characterization.  The left ureter is unremarkable without calculi seen.  There is left nonobstructive nephrolithiasis.  There is right perinephric and periureteral inflammation.  There is mild right hydroureteronephrosis secondary to a 2-3 mm calculus at the proximal aspect of the UVJ.  There is additional right nonobstructive nephrolithiasis.  The urinary bladder is nondistended.  The uterus and adnexa are unremarkable.  There is a small amount of fluid in the pelvis.    There are a few scattered colonic diverticula without inflammation.  The terminal ileum is unremarkable.  The appendix or appendiceal stump is unremarkable.  The small bowel is grossly unremarkable.  There are a few scattered shotty periaortic, pericaval, and mesenteric lymph nodes.  There is atherosclerotic calcification of the aorta and its branches.  No focal organized pelvic fluid collection.    There is osteopenia.  There are degenerative changes of the bilateral sacroiliac joints and spine.  No significant inguinal lymphadenopathy.                                       Medications   sodium chloride 0.9% bolus 1,000 mL 1,000 mL (0 mLs Intravenous Stopped 1/3/24 1000)   ondansetron injection 4 mg (4 mg Intravenous Given 1/3/24 0711)   morphine injection 4 mg (4 mg Intravenous Given 1/3/24 0711)   iohexoL (OMNIPAQUE 350) injection 100 mL (100 mLs Intravenous Given 1/3/24 0817)     Medical Decision Making  Maria Alejandra De La Rosa is a 68 y.o. female who presents to the ED today for RLQ  abdominal pain radiating around her right flank and to the back.  Patient states that she began having episodes of diarrhea and nausea around 04:00 this morning, which was followed by sudden onset of this abdominal pain at aprox. 05:00.    On initial evaluation, patient in significant distress w/ 10/10 abdominal pain as described above.  Patient was hypertensive at 215/110, which I suspect is 2/2 acute pain combination w/ known history of HTN; otherwise VSS.  Patient had no abdominal tenderness to palpation, rebound, guarding, mass.    On re-evaluation, patient isn't significantly less pain w/resolution of nausea.  Improvement in HTN to 154/70. Otherwise, physical exam remains the same.       ED interventions include   - Morphine 4mg for pain  - Zofran     Labs include  - CBC largely WNL, decreasing concern for acute infectious process.   - CMP w/ elevated BUN of 24 and decreased GFR of 48.7 likely consistent w/ongoing nephrolithiasis.  - lipase WNL reducing concern for pancreatitis  - UA w/occult blood but no evidence of infection.  This is consistent w/uncomplicated nephrolithiasis.    Imaging includes:  - CT abdomen pelvis w/ contrast showing bilateral nonobstructive nephrolithiasis w/ right hydroureteronephrosis consistent w/nephrolithiasis.  Patient did have incidental 5 minutes present at CT scan, but these were investigation at this time.     DDx: Nephrolithiasis v. Diverticulitis v. Pancreatitis v. AAA v. Bowel perforation v. Volvulus     Most likely Dx:  Nephrolithiasis given patient's clinical course and CT findings.     Disposition:  Discharged home w/ pain management instructions and urology f/u.  Patient expressed understanding and agreement w/ this plan    Please see HPI, physical exam, ED course for additional details.    Amount and/or Complexity of Data Reviewed  Labs: ordered.  Radiology: ordered and independent interpretation performed.     Details: CT renal:  Right-sided ureteral  calculus    Risk  Parenteral controlled substances.              Attending Attestation:   Physician Attestation Statement for Resident:  As the supervising MD   Physician Attestation Statement: I have personally seen and examined this patient.   I agree with the above history.  -: Flank/abdominal pain   As the supervising MD I agree with the above PE.     As the supervising MD I agree with the above treatment, course, plan, and disposition.                                           Clinical Impression:  Final diagnoses:  [R10.9] Abdominal pain  [N20.0] Nephrolithiasis (Primary)          ED Disposition Condition    Discharge Stable          ED Prescriptions       Medication Sig Dispense Start Date End Date Auth. Provider    oxyCODONE-acetaminophen (PERCOCET) 5-325 mg per tablet Take 2 tablets by mouth every 6 (six) hours as needed for Pain. 12 tablet 1/3/2024 -- Larry Jackson MD    ondansetron (ZOFRAN) 4 MG tablet Take 1 tablet (4 mg total) by mouth every 6 (six) hours. 12 tablet 1/3/2024 -- Larry Jackson MD          Follow-up Information    None          Larry Jackson MD  Resident  01/03/24 1957       Kingston Rubin III, MD  01/04/24 1310

## 2024-01-03 NOTE — DISCHARGE INSTRUCTIONS
Diagnosis: Kidney stone    Tests today showed:   Labs Reviewed   CBC W/ AUTO DIFFERENTIAL - Abnormal; Notable for the following components:       Result Value    WBC 12.75 (*)     Gran # (ANC) 9.7 (*)     Gran % 75.9 (*)     Lymph % 14.8 (*)     All other components within normal limits   COMPREHENSIVE METABOLIC PANEL - Abnormal; Notable for the following components:    Glucose 203 (*)     BUN 24 (*)     eGFR 48.7 (*)     All other components within normal limits   URINALYSIS, REFLEX TO URINE CULTURE - Abnormal; Notable for the following components:    Occult Blood UA 2+ (*)     All other components within normal limits    Narrative:     Specimen Source->Urine   URINALYSIS MICROSCOPIC - Abnormal; Notable for the following components:    RBC, UA >100 (*)     All other components within normal limits    Narrative:     Specimen Source->Urine   LIPASE     Imaging Results               CT Abdomen Pelvis With IV Contrast NO Oral Contrast (Final result)  Result time 01/03/24 08:29:20      Final result by Jed Field MD (01/03/24 08:29:20)                   Impression:      This report was flagged in Epic as abnormal.    1. Mild right hydroureteronephrosis noting perinephric and periureteral inflammation secondary to a 2-3 mm calculus at the proximal right UVJ.  Correlation with urinalysis recommended to exclude superimposed infection.  2. Bilateral nonobstructive nephrolithiasis.  3. Findings suggesting hepatic steatosis, correlation with LFTs recommended.  4. Prominence of the common duct noting pneumobilia, correlation with history of prior procedure.  5. Focal dilation of the pancreatic duct at the body/tail noting surgical change of pancreatectomy versus atrophic change from prior pancreatitis.  6. There are a few pulmonary micro nodules within the right lower lobe.  For multiple solid nodules all <6 mm, Fleischner Society 2017 guidelines recommend no routine follow up for a low risk patient, or follow up with  non-contrast chest CT at 12 months after discovery in a high risk patient.  7. Please see above for several additional findings.      Electronically signed by: Jed Field MD  Date:    01/03/2024  Time:    08:29               Narrative:    EXAMINATION:  CT ABDOMEN PELVIS WITH IV CONTRAST    CLINICAL HISTORY:  Abdominal pain, acute, nonlocalized;    TECHNIQUE:  Low dose axial images, sagittal and coronal reformations were obtained from the lung bases to the pubic symphysis following the IV administration of 100 mL of Omnipaque 350 .  Oral contrast was not given.    COMPARISON:  None.    FINDINGS:  Images of the lower thorax are remarkable for bilateral dependent atelectasis.  There are a few scattered 2-3 mm pulmonary nodules within the right lower lobe.    The liver is hypoattenuating suggesting steatosis, correlation with LFTs recommended.  The spleen and adrenal glands are unremarkable.  The gallbladder is surgically absent.  There is pneumobilia.  The common duct is dilated measuring up to 1.3 cm without convincing intraluminal filling defect.  There is atrophic change of the pancreas noting scattered pancreatic calcification.  There is dilation of the pancreatic duct primarily within the pancreatic body measuring 6 mm.  There is surgical change of distal pancreatectomy versus atrophy from prior pancreatitis.  The stomach is decompressed without wall thickening.  The portal vein, splenic vein, SMV, celiac axis and SMA all are patent.  There are a few scattered upper limit of normal caliber abdominal lymph nodes.    The kidneys enhance asymmetrically noting slight delayed enhancement of the right kidney as compared to the left.  No left hydronephrosis.  There are a few punctate foci of low attenuation within the interpolar region of the left kidney, too small for characterization.  The left ureter is unremarkable without calculi seen.  There is left nonobstructive nephrolithiasis.  There is right perinephric  and periureteral inflammation.  There is mild right hydroureteronephrosis secondary to a 2-3 mm calculus at the proximal aspect of the UVJ.  There is additional right nonobstructive nephrolithiasis.  The urinary bladder is nondistended.  The uterus and adnexa are unremarkable.  There is a small amount of fluid in the pelvis.    There are a few scattered colonic diverticula without inflammation.  The terminal ileum is unremarkable.  The appendix or appendiceal stump is unremarkable.  The small bowel is grossly unremarkable.  There are a few scattered shotty periaortic, pericaval, and mesenteric lymph nodes.  There is atherosclerotic calcification of the aorta and its branches.  No focal organized pelvic fluid collection.    There is osteopenia.  There are degenerative changes of the bilateral sacroiliac joints and spine.  No significant inguinal lymphadenopathy.                                      Treatments you had today:   Medications   sodium chloride 0.9% bolus 1,000 mL 1,000 mL (0 mLs Intravenous Stopped 1/3/24 1000)   ondansetron injection 4 mg (4 mg Intravenous Given 1/3/24 0711)   morphine injection 4 mg (4 mg Intravenous Given 1/3/24 0711)   iohexoL (OMNIPAQUE 350) injection 100 mL (100 mLs Intravenous Given 1/3/24 0817)       Home Care Instructions:  - Strain your urine as instructed  - Stay well hydrated  - Continue taking your home medications as prescribed    Take ibuprofen (also called Advil, Motrin) for your pain. This medicine is available over-the-counter in 200 mg tablets.  - Take up to 600 mg every 6 hours, or 800 mg every 8 hours as needed   - Do not take more than this amount, as it can cause kidney problems, bleeding in your stomach, and other serious problems.   - Do not also take naproxen (Aleve) at the same time or on the same day  - If you have heart problems or uncontrolled high blood pressure, you should not take ibuprofen for more than 3 days without discussing with your doctor    If  your pain is not controlled with ibuprofen, you may also take Percocet (acetaminophen-hydrocodone).  - Take this medication only when needed for breakthrough pain.   - Do not take more than the prescribed amount to control your pain.  - Do not operate machinery, drive, exercise, perform caregiving, make important decisions or perform important tasks while taking this medication. It can make you drowsy or forgetful.  - Percocet is addictive in as little as 3 days, so take only as needed.  - Percocet can dangerously slow or stop your breathing if you take too much, or if you combine it with alcohol or sedating medicines (including Xanex, Ativan) or illegal drugs.   - This medication can make you constipated (hard to poop), so take fiber supplements and a stool softener while taking this medicine.   - This medication contains acetaminophen (Tylenol). Each pill has 325 mg of acetaminophen. Do not take more than 4,000 mg of acetaminophen within 24 hours as this may lead to liver damage.  - Return to the emergency department if this medication does not control your pain.    For nausea and vomiting, take the ondansetron (Zofran) as prescribed.  Place it under your tongue and let the medication dissolve on its own  Do not swallow whole  Give the medication 30 minutes to work before eating or drinking  Drink clear fluids (water, gatorade, broth, etc) and eat simple foods  Do not eat fatty, greasy, heavy meals when using this medication    Follow-up plan:  - Follow-up with: Primary care doctor within 3 - 5  days  - Follow-up for additional testing and/or evaluation as directed by your primary doctor  - Follow up with the urologist as directed    Return to the Emergency Department for symptoms including but not limited to: worsening symptoms, severe abdominal or back pain, shortness of breath or chest pain, vomiting with inability to hold down fluids, blood in your stool, fevers greater than 100.4°F, dizziness or passing  out/fainting/unconsciousness, or other concerning symptoms.

## 2024-01-08 NOTE — TELEPHONE ENCOUNTER
A 2nd attempt has been made to retrieve requested documents from Maria Alejandra De La Rosa  via MY CHART. The final contact attempt will be made in 5 business days

## 2024-01-09 ENCOUNTER — OFFICE VISIT (OUTPATIENT)
Dept: UROLOGY | Facility: CLINIC | Age: 69
End: 2024-01-09
Payer: MEDICARE

## 2024-01-09 VITALS
DIASTOLIC BLOOD PRESSURE: 71 MMHG | BODY MASS INDEX: 36 KG/M2 | SYSTOLIC BLOOD PRESSURE: 128 MMHG | HEART RATE: 67 BPM | WEIGHT: 224 LBS | HEIGHT: 66 IN

## 2024-01-09 DIAGNOSIS — N20.0 NEPHROLITHIASIS: Primary | ICD-10-CM

## 2024-01-09 DIAGNOSIS — E11.59 HYPERTENSION ASSOCIATED WITH DIABETES: ICD-10-CM

## 2024-01-09 DIAGNOSIS — I15.2 HYPERTENSION ASSOCIATED WITH DIABETES: ICD-10-CM

## 2024-01-09 PROCEDURE — 99204 OFFICE O/P NEW MOD 45 MIN: CPT | Mod: S$GLB,,, | Performed by: UROLOGY

## 2024-01-09 PROCEDURE — 3078F DIAST BP <80 MM HG: CPT | Mod: CPTII,S$GLB,, | Performed by: UROLOGY

## 2024-01-09 PROCEDURE — 1126F AMNT PAIN NOTED NONE PRSNT: CPT | Mod: CPTII,S$GLB,, | Performed by: UROLOGY

## 2024-01-09 PROCEDURE — 3288F FALL RISK ASSESSMENT DOCD: CPT | Mod: CPTII,S$GLB,, | Performed by: UROLOGY

## 2024-01-09 PROCEDURE — 3008F BODY MASS INDEX DOCD: CPT | Mod: CPTII,S$GLB,, | Performed by: UROLOGY

## 2024-01-09 PROCEDURE — 1101F PT FALLS ASSESS-DOCD LE1/YR: CPT | Mod: CPTII,S$GLB,, | Performed by: UROLOGY

## 2024-01-09 PROCEDURE — 3074F SYST BP LT 130 MM HG: CPT | Mod: CPTII,S$GLB,, | Performed by: UROLOGY

## 2024-01-09 PROCEDURE — 99999 PR PBB SHADOW E&M-EST. PATIENT-LVL IV: CPT | Mod: PBBFAC,,, | Performed by: UROLOGY

## 2024-01-09 PROCEDURE — 1159F MED LIST DOCD IN RCRD: CPT | Mod: CPTII,S$GLB,, | Performed by: UROLOGY

## 2024-01-09 PROCEDURE — 1160F RVW MEDS BY RX/DR IN RCRD: CPT | Mod: CPTII,S$GLB,, | Performed by: UROLOGY

## 2024-01-09 NOTE — PROGRESS NOTES
Subjective:      Patient ID: Maria Alejandra De La Rosa is a 68 y.o. female.    Chief Complaint: kidney stone ER f/u     Patient is a 68 y.o. female who is new to our clinic and referred by the ED, Dr. Jackson for evaluation of kidney stones.     HPI    Urolithiasis  Patient complains of right abdominal pain with radiation to the right flank. Onset of symptoms was abrupt starting 1 week ago with completely resolved course since that time. Patient describes the pain as sharp, continuous and rated as 10 / 10 in the ED but currently 0/10. The patient had nausea and vomiting (which has resolved) and no diaphoresis. There has been no fever or chills. The patient is not complaining of dysuria or frequency. Risk factors for urolithiasis: family history of stone disease.    Patient had a CT Scan.   CT abd/pelvis from 1/3/24 was independently reviewed today and reveals bilateral punctate renal stones, 2-3 mm right UVJ stone, mild right hydronephrosis.     Patient was d/c'd from the ED without a strainer.  Doesn't know if she passed the stone.  All symptoms resolved.  Never had a stone previously.      Review of Systems  All other systems reviewed and negative except pertinent positives noted in HPI.    Objective:     Physical Exam  Constitutional:       General: She is not in acute distress.     Appearance: She is well-developed.   HENT:      Head: Normocephalic and atraumatic.   Eyes:      General: No scleral icterus.  Neck:      Trachea: No tracheal deviation.   Pulmonary:      Effort: Pulmonary effort is normal. No respiratory distress.   Neurological:      Mental Status: She is alert and oriented to person, place, and time.   Psychiatric:         Behavior: Behavior normal.         Thought Content: Thought content normal.         Judgment: Judgment normal.       Assessment:     1. Hypertension associated with diabetes    2. Nephrolithiasis      Plan:     1. Hypertension associated with diabetes    2. Nephrolithiasis        No orders of  the defined types were placed in this encounter.    1. Kidney stones:  -General risk factors for kidney stones and the conservative measures to prevent kidney stones in the future were discussed with the patient in detail.  The patient was encouraged to drink 2-3 liters of water a day, limit iced tea and joselyn as well as foods high in oxalate.  They were cautioned to try to limit salt and red meat intake.  We also discussed adding citrate to the diet with the addition of john paul or lemon juice to their water or alternatively with crystal light.   -CT scan was independently reviewed today and reveals 2mm right UVJ stone with hydronephrosis.   -Urinalysis in ED: not suggestive of infection.     -renal US to ensure resolution of hydronephrosis; will notify of result via portal.  If normal, no f/u necessary unless stones recur.     --BP reviewed  -stable, continue meds and f/u with PCP

## 2024-01-10 ENCOUNTER — HOSPITAL ENCOUNTER (OUTPATIENT)
Dept: RADIOLOGY | Facility: HOSPITAL | Age: 69
Discharge: HOME | End: 2024-01-10
Attending: UROLOGY
Payer: MEDICARE

## 2024-01-10 DIAGNOSIS — N20.0 NEPHROLITHIASIS: ICD-10-CM

## 2024-01-10 PROCEDURE — 76770 US EXAM ABDO BACK WALL COMP: CPT | Mod: TC

## 2024-01-10 PROCEDURE — 76770 US EXAM ABDO BACK WALL COMP: CPT | Mod: 26,,, | Performed by: RADIOLOGY

## 2024-01-15 NOTE — TELEPHONE ENCOUNTER
Maria Alejandra De La Rosa was scheduled on 01/05/24 to provide the following documentation to initiate the application process.           Proof of household Income( such as social security statement, 1099 form, pension statement or 3 consecutive pay stubs, Copy of all Insurance cards( front and back), and Signed and dated HIPAA /Patient Information Forms          As of today, the documents have not been received.  Please instruct the patient to email requested documentation to [email or web address]  or reach out to Moustapha Lorenzana [phone] with any follow-up questions.

## 2024-02-12 DIAGNOSIS — I15.2 HYPERTENSION ASSOCIATED WITH DIABETES: ICD-10-CM

## 2024-02-12 DIAGNOSIS — E11.59 HYPERTENSION ASSOCIATED WITH DIABETES: ICD-10-CM

## 2024-02-12 RX ORDER — LOSARTAN POTASSIUM 100 MG/1
100 TABLET ORAL
Qty: 90 TABLET | Refills: 3 | Status: SHIPPED | OUTPATIENT
Start: 2024-02-12

## 2024-02-12 NOTE — TELEPHONE ENCOUNTER
No care due was identified.  [facility] Embedded Care Due Messages. Reference number: [id number].   2/12/2024 10:47:35 AM CST

## 2024-02-14 NOTE — TELEPHONE ENCOUNTER
Hello,       A Patient Assistance Application for Maria Alejandra De La Rosa - MRN  [id number] was faxed to your office @ [phone]. Please have Anmol Baugh MD, review the application to ensure the prescription is correct. If correct, sign and fax the application back to the Pharmacy Patient Assistance Team @[phone].     Please do not write any corrections on this application.  If changes are needed on this application, please inform the PAP team, and the corrected application will be faxed back to your office for approval and signature.

## 2024-03-01 NOTE — TELEPHONE ENCOUNTER
We are pleased to inform you Maria Alejandra De La Rosa has been approved in the [employer] Patient Assistance Program.  [employer] has shared this information with your patient.   Approval Details  Eligibility start Date: 02/29/24  Eligibility end date: 12/31/24 (Updated proof of eligibility required to re-enroll for 2025 calendar year).  Approved Medication: Ozempic  Day supply: 84  Allotted Refills: 3 (Please be advised Program allows only 3 refills per eligibility period regardless of changes in strength).   Estimated Shipping: 10-14 business days.  Shipping Location: [facility] (You and your patient will receive further communication from an [facility] once Medication is received)                                            Important Note  It is imperative that any changes to strength or discontinuation of this medication be referred to the Pharmacy Patient Assistance Team Pool Via EPIC to prevent Gaps in therapy.     Right arm;

## 2024-03-07 ENCOUNTER — TELEPHONE (OUTPATIENT)
Dept: ENDOCRINOLOGY | Facility: CLINIC | Age: 69
End: 2024-03-07
Payer: MEDICARE

## 2024-03-07 DIAGNOSIS — E11.65 TYPE 2 DIABETES MELLITUS WITH HYPERGLYCEMIA, WITHOUT LONG-TERM CURRENT USE OF INSULIN: ICD-10-CM

## 2024-03-07 RX ORDER — SEMAGLUTIDE 1.34 MG/ML
1 INJECTION, SOLUTION SUBCUTANEOUS
Qty: 12 ML | Refills: 3 | Status: SHIPPED | OUTPATIENT
Start: 2024-03-07

## 2024-03-07 NOTE — TELEPHONE ENCOUNTER
----- Message from Geoff Flores sent at 3/7/2024  9:30 AM CST -----  Regarding: Ozempic 1mg Patient Assistance Program  [facility] has received medication from [facility] patient assistance program for your patient Maria Alejandra De La Rosa ([id number]).  At your earliest convenience please send to [facility] escript for.  Ozempic 1mg (qty 4 pens)  Thanks

## 2024-04-01 ENCOUNTER — LAB VISIT (OUTPATIENT)
Dept: LAB | Facility: HOSPITAL | Age: 69
End: 2024-04-01
Attending: HOSPITALIST
Payer: MEDICARE

## 2024-04-01 DIAGNOSIS — E11.65 TYPE 2 DIABETES MELLITUS WITH HYPERGLYCEMIA, WITHOUT LONG-TERM CURRENT USE OF INSULIN: ICD-10-CM

## 2024-04-01 LAB
ALBUMIN SERPL BCP-MCNC: 3.7 G/DL (ref 3.5–5.2)
ANION GAP SERPL CALC-SCNC: 10 MMOL/L (ref 8–16)
BUN SERPL-MCNC: 22 MG/DL (ref 8–23)
CALCIUM SERPL-MCNC: 10 MG/DL (ref 8.7–10.5)
CHLORIDE SERPL-SCNC: 104 MMOL/L (ref 95–110)
CO2 SERPL-SCNC: 28 MMOL/L (ref 23–29)
CREAT SERPL-MCNC: 1.3 MG/DL (ref 0.5–1.4)
EST. GFR  (NO RACE VARIABLE): 44 ML/MIN/1.73 M^2
ESTIMATED AVG GLUCOSE: 131 MG/DL (ref 68–131)
GLUCOSE SERPL-MCNC: 95 MG/DL (ref 70–110)
HBA1C MFR BLD: 6.2 % (ref 4–5.6)
PHOSPHATE SERPL-MCNC: 3.9 MG/DL (ref 2.7–4.5)
POTASSIUM SERPL-SCNC: 4 MMOL/L (ref 3.5–5.1)
SODIUM SERPL-SCNC: 142 MMOL/L (ref 136–145)

## 2024-04-01 PROCEDURE — 83036 HEMOGLOBIN GLYCOSYLATED A1C: CPT | Performed by: HOSPITALIST

## 2024-04-01 PROCEDURE — 80069 RENAL FUNCTION PANEL: CPT | Performed by: HOSPITALIST

## 2024-04-01 PROCEDURE — 36415 COLL VENOUS BLD VENIPUNCTURE: CPT | Performed by: HOSPITALIST

## 2024-04-08 ENCOUNTER — OFFICE VISIT (OUTPATIENT)
Dept: ENDOCRINOLOGY | Facility: CLINIC | Age: 69
End: 2024-04-08
Payer: MEDICARE

## 2024-04-08 VITALS
SYSTOLIC BLOOD PRESSURE: 124 MMHG | DIASTOLIC BLOOD PRESSURE: 76 MMHG | WEIGHT: 223.19 LBS | BODY MASS INDEX: 36.03 KG/M2 | HEART RATE: 77 BPM

## 2024-04-08 DIAGNOSIS — E78.00 HYPERCHOLESTEROLEMIA: ICD-10-CM

## 2024-04-08 DIAGNOSIS — I15.2 HYPERTENSION ASSOCIATED WITH DIABETES: ICD-10-CM

## 2024-04-08 DIAGNOSIS — E66.01 CLASS 2 SEVERE OBESITY DUE TO EXCESS CALORIES WITH SERIOUS COMORBIDITY AND BODY MASS INDEX (BMI) OF 35.0 TO 35.9 IN ADULT: ICD-10-CM

## 2024-04-08 DIAGNOSIS — E11.65 TYPE 2 DIABETES MELLITUS WITH HYPERGLYCEMIA, WITHOUT LONG-TERM CURRENT USE OF INSULIN: Primary | ICD-10-CM

## 2024-04-08 DIAGNOSIS — E11.69 DIABETES MELLITUS TYPE 2 IN OBESE: Chronic | ICD-10-CM

## 2024-04-08 DIAGNOSIS — E11.59 HYPERTENSION ASSOCIATED WITH DIABETES: ICD-10-CM

## 2024-04-08 DIAGNOSIS — E66.9 DIABETES MELLITUS TYPE 2 IN OBESE: Chronic | ICD-10-CM

## 2024-04-08 PROCEDURE — 3008F BODY MASS INDEX DOCD: CPT | Mod: CPTII,S$GLB,, | Performed by: HOSPITALIST

## 2024-04-08 PROCEDURE — 3044F HG A1C LEVEL LT 7.0%: CPT | Mod: CPTII,S$GLB,, | Performed by: HOSPITALIST

## 2024-04-08 PROCEDURE — 3288F FALL RISK ASSESSMENT DOCD: CPT | Mod: CPTII,S$GLB,, | Performed by: HOSPITALIST

## 2024-04-08 PROCEDURE — 1160F RVW MEDS BY RX/DR IN RCRD: CPT | Mod: CPTII,S$GLB,, | Performed by: HOSPITALIST

## 2024-04-08 PROCEDURE — 3074F SYST BP LT 130 MM HG: CPT | Mod: CPTII,S$GLB,, | Performed by: HOSPITALIST

## 2024-04-08 PROCEDURE — 1101F PT FALLS ASSESS-DOCD LE1/YR: CPT | Mod: CPTII,S$GLB,, | Performed by: HOSPITALIST

## 2024-04-08 PROCEDURE — 3078F DIAST BP <80 MM HG: CPT | Mod: CPTII,S$GLB,, | Performed by: HOSPITALIST

## 2024-04-08 PROCEDURE — 1159F MED LIST DOCD IN RCRD: CPT | Mod: CPTII,S$GLB,, | Performed by: HOSPITALIST

## 2024-04-08 PROCEDURE — 99214 OFFICE O/P EST MOD 30 MIN: CPT | Mod: S$GLB,,, | Performed by: HOSPITALIST

## 2024-04-08 PROCEDURE — 4010F ACE/ARB THERAPY RXD/TAKEN: CPT | Mod: CPTII,S$GLB,, | Performed by: HOSPITALIST

## 2024-04-08 PROCEDURE — 99999 PR PBB SHADOW E&M-EST. PATIENT-LVL IV: CPT | Mod: PBBFAC,,, | Performed by: HOSPITALIST

## 2024-04-08 RX ORDER — GLIPIZIDE AND METFORMIN HCL 5; 500 MG/1; MG/1
1 TABLET, FILM COATED ORAL
Qty: 180 TABLET | Refills: 3 | Status: SHIPPED | OUTPATIENT
Start: 2024-04-08 | End: 2025-04-08

## 2024-04-08 NOTE — PATIENT INSTRUCTIONS
Glipizide/Metformin 5/500mg 1 pill  one a day once  Ozempic 1 mg once a week      Goal blood sugar in the morning, before breakfast:   Glucose goal AFTER MEALS:    2 Hour after: less than 140  Before going to bed: 100-140  Do not go to bed with glucose less than 100  Have a small snack if glucose is lower than 100      Please check glucose 1x times a day (before breakfast).   You can keep track of the glucose with Glucose logs or any papers  Please filled out and bring back to next office visit for me to review  Document any (LOW BLOOD GLUCOSE) hypoglycemia  episode with date and time for me to review    We will plan an in-clinic visit in 4 months, with labs prior to that appointment.    Contact information:  Anmol Baugh M.D  [facility] [address] [facility] [address], [address]  [place] [address]    Office:  [phone]  Fax:  [phone]

## 2024-04-08 NOTE — ASSESSMENT & PLAN NOTE
- Body mass index is 36.03 kg/m².  - dietary discussion as above  - continue to monitor weight  - continue Ozempic

## 2024-04-08 NOTE — ASSESSMENT & PLAN NOTE
- Diabetes is at goal, given most current A1C, and recent hospital admission for hyperglycemia  - Goal A1C for patient is 7%  - diabetes is complicated by dietary indiscretion, poor insight to diabetes> doing better  - Diabetic supplies/medications were reviewed this visit to ensure continue steady supplies  - Advised patient to get routine feet care, routine eye exam, plus routine maintenance screening  - Diabetes goal including glucose glucose and A1C goals discussed  - weight loss noted    Plan  - Adjustment made:  Continue Ozempic 1.0 mg once a week injection,  getting medication through Pharmacy Assistance Program  - Continue glipizide/metformin combination pill, 5/500 mg  decrease to daily.  To prevent hypoglycemia.    - Advised patient to continue monitor glucose, asked to filled glucose log and bring back for review at next office visit  - Encouragement of dietary modification, portion size control, decreasing carbohydrates intake  - Encouragement of dietary changes and weight loss  - Check lab work in 5 months re-evaluate  - Clear written instruction given on AVS. Follow up as scheduled

## 2024-04-08 NOTE — ASSESSMENT & PLAN NOTE
- Body mass index is 36.03 kg/m².  - dietary discussion as above  - encourage to continue weight loss

## 2024-04-08 NOTE — PROGRESS NOTES
Subjective:      Patient ID: Maria Alejandra De La Rosa is a 69 y.o. female presented to [facility] on 4/8/2024.  Chief Complaint:  Diabetes    History of Present Illness: Maria Alejandra De La Rosa is a 69 y.o. female here for  type 2 diabetes  Other significant past medical history:  Obesity, hypertension    Diabetes mellitus Type 2  - Diagnosed w/ DM: in 2014  - Diabetes Education: No  - Patient with recent hospital admission 1/6/2023 to 1/8/2023 for uncontrolled hyperglycemia and possible right lower lobe pneumonia. Her glucose earlier in clinic was greater than 400 in the last week she has not been monitoring her glucose, she does not take insulin. ED workup is notable for mild leukocytosis hypokalemia, ISAI is present, hyperglycemic workup is negative for serum ketones and patient has a non acidotic pH.  A1c found to be 13.0%.  - Doing better over the last few years, 3076-1980    Interval history:  Patient is here for diabetes management A1c 6.2%.  Patient has been making dietary changes at home. A1c improvement  Doing well. Getting Ozempic 1 mg  from PAP.   Glipizide/metformin b.I.d.  Reports did have 1 hypoglycemia: Glucose 65 ( 11 AM before lunch), no symptoms  Glucose 84, 78, 101, 102  In the morning.  Current weight: 223, previous weight 222, 231  Doing sitting bicycle       Current reported meds:    Ozempic 1.0 mg once a week injection  Glipizide/metformin 5/500 mg 1 tablet twice a day  Previous meds tried:              Trulicity >> too expensive, last used 6/2022              Glimepiride 4mg daily   Januvia 50mg daily  Home glucose checks: checks daily , Logs reviewed  - no obvious hypoglycemia.  Glucose trend much better over the last month  108  115  104  93  92  132    Diet/Exercise:   - Eating 3x meals per day, eating mostly preprepared TV dinner              - Drink: water, crystral light  Sleeping:   - Any difficulty falling asleep, staying asleep, nighttime coughing, or partner complaints of  "snoring?  No  - What time do you usually go to bed? What time do you usually wake up? 10-6  Weight trend: stable  Diabetes Related Hospitalization:  No  Hx of pancreatitis: No, denies  Family history of diabetes: Yes  Occupation:  Retired    Eye exam current (within one year): yes, DR: no, unknown  Reports cuts or ulcers on feet:   Denies, has podiatrist  Statin: Taking, ACE/ARB: Taking    Diabetes lab work  Lab Results   Component Value Date    HGBA1C 6.2 (H) 04/01/2024    HGBA1C 6.7 (H) 12/01/2023    HGBA1C 7.1 (H) 07/31/2023    HGBA1C 8.8 (H) 04/28/2023     Lab Results   Component Value Date    CPEPTIDE 2.86 07/31/2023      No results found for: "FRUCTOSAMINE"  Lab Results   Component Value Date    MICALBCREAT Unable to calculate 04/28/2023     No results found for: "USPCHHLW30"    Diabetes Management Status: Reviewed this office visit  Screening or Prevention Patient's value Goal Complete/Controlled?   Lipid profile : 05/24/2022 Annually Yes     Dilated retinal exam : 10/11/2022 Annually Yes     Foot exam   : 01/19/2023 Annually Yes        Reviewed past surgical, medical, family, social history and updated as appropriate.  Review of Systems: see HPI above  Objective:   /76   Pulse 77   Wt 101.2 kg (223 lb 3.2 oz)   BMI 36.03 kg/m²   Body mass index is 36.03 kg/m².  Vital signs reviewed    Physical Exam  Vitals and nursing note reviewed.   Constitutional:       Appearance: Normal appearance. She is well-developed. She is obese. She is not ill-appearing.   Neck:      Thyroid: No thyromegaly.   Pulmonary:      Effort: Pulmonary effort is normal. No respiratory distress.   Musculoskeletal:         General: Normal range of motion.      Cervical back: Normal range of motion.   Neurological:      General: No focal deficit present.      Mental Status: She is alert. Mental status is at baseline.   Psychiatric:         Mood and Affect: Mood normal.         Behavior: Behavior normal.     Lab Reviewed:  See results " in subjective  Lab Results   Component Value Date    HGBA1C 6.2 (H) 04/01/2024     Lab Results   Component Value Date    CHOL 99 (L) 05/24/2022    HDL 49 05/24/2022    LDLCALC 39.0 (L) 05/24/2022    TRIG 55 05/24/2022    CHOLHDL 49.5 05/24/2022     Lab Results   Component Value Date     04/01/2024    K 4.0 04/01/2024     04/01/2024    CO2 28 04/01/2024    GLU 95 04/01/2024    BUN 22 04/01/2024    CREATININE 1.3 04/01/2024    CALCIUM 10.0 04/01/2024    PHOS 3.9 04/01/2024    PROT 7.1 01/03/2024    ALBUMIN 3.7 04/01/2024    BILITOT 0.8 01/03/2024    ALKPHOS 108 01/03/2024    AST 19 01/03/2024    ALT 20 01/03/2024    ANIONGAP 10 04/01/2024    EGFRNORACEVR 44.0 (A) 04/01/2024    TSH 3.670 05/24/2022     Assessment     1. Type 2 diabetes mellitus with hyperglycemia, without long-term current use of insulin  Basic Metabolic Panel    Hemoglobin A1C    Microalbumin/Creatinine Ratio, Urine    glipizide-metformin (METAGLIP) 5-500 mg per tablet      2. Class 2 severe obesity due to excess calories with serious comorbidity and body mass index (BMI) of 35.0 to 35.9 in adult        3. Diabetes mellitus type 2 in obese        4. Hypercholesterolemia        5. Hypertension associated with diabetes            Plan     Type 2 diabetes mellitus with hyperglycemia, without long-term current use of insulin  - Diabetes is at goal, given most current A1C, and recent hospital admission for hyperglycemia  - Goal A1C for patient is 7%  - diabetes is complicated by dietary indiscretion, poor insight to diabetes> doing better  - Diabetic supplies/medications were reviewed this visit to ensure continue steady supplies  - Advised patient to get routine feet care, routine eye exam, plus routine maintenance screening  - Diabetes goal including glucose glucose and A1C goals discussed  - weight loss noted    Plan  - Adjustment made:  Continue Ozempic 1.0 mg once a week injection,  getting medication through Pharmacy Assistance Program  -  Continue glipizide/metformin combination pill, 5/500 mg  decrease to daily.  To prevent hypoglycemia.    - Advised patient to continue monitor glucose, asked to filled glucose log and bring back for review at next office visit  - Encouragement of dietary modification, portion size control, decreasing carbohydrates intake  - Encouragement of dietary changes and weight loss  - Check lab work in 5 months re-evaluate  - Clear written instruction given on AVS. Follow up as scheduled    Class 2 severe obesity due to excess calories with serious comorbidity and body mass index (BMI) of 35.0 to 35.9 in adult  - Body mass index is 36.03 kg/m².  - dietary discussion as above  - continue to monitor weight  - continue Ozempic    Diabetes mellitus type 2 in obese  - Body mass index is 36.03 kg/m².  - dietary discussion as above  - encourage to continue weight loss    Hypercholesterolemia  - lipid panel review today  - ASCVD Risk below: Statin: Taking  The ASCVD Risk score (Tea LAU, et al., 2019) failed to calculate for the following reasons:    The valid total cholesterol range is 130 to 320 mg/dL    Hypertension associated with diabetes  - Blood pressure review in clinic today  - On  blood pressure medication  - Manage by PCP     Advised patient to follow up with PCP for routine health maintenance care.   RTC in 5-6months    Anmol Baugh M.D.  Endocrinology  [facility]  4/8/2024      Disclaimer: This note has been generated in part with the use of voice-recognition software. There may be typographical errors that have been missed during proof-reading.

## 2024-04-12 DIAGNOSIS — E11.59 HYPERTENSION ASSOCIATED WITH DIABETES: ICD-10-CM

## 2024-04-12 DIAGNOSIS — I15.2 HYPERTENSION ASSOCIATED WITH DIABETES: ICD-10-CM

## 2024-04-12 RX ORDER — CARVEDILOL 12.5 MG/1
12.5 TABLET ORAL 2 TIMES DAILY WITH MEALS
Qty: 180 TABLET | Refills: 3 | Status: SHIPPED | OUTPATIENT
Start: 2024-04-12

## 2024-04-12 NOTE — TELEPHONE ENCOUNTER
Care Due:                  Date            Visit Type   Department     Provider  --------------------------------------------------------------------------------                                SAME DAY -                              ESTABLISHED   [facility] INTERNAL  Last Visit: 11-      PATIENT      MEDICINE       Carrington Lao  Next Visit: None Scheduled  None         None Found                                                            Last  Test          Frequency    Reason                     Performed    Due Date  --------------------------------------------------------------------------------    Lipid Panel.  12 months..  atorvastatin.............  05- 05-    Health [facility] Embedded Care Due Messages. Reference number: [id number].   4/12/2024 11:59:37 AM CDT

## 2024-04-25 DIAGNOSIS — E78.5 HYPERLIPIDEMIA ASSOCIATED WITH TYPE 2 DIABETES MELLITUS: ICD-10-CM

## 2024-04-25 DIAGNOSIS — E78.00 HYPERCHOLESTEROLEMIA: ICD-10-CM

## 2024-04-25 DIAGNOSIS — E11.69 HYPERLIPIDEMIA ASSOCIATED WITH TYPE 2 DIABETES MELLITUS: ICD-10-CM

## 2024-04-25 RX ORDER — ATORVASTATIN CALCIUM 10 MG/1
10 TABLET, FILM COATED ORAL
Qty: 90 TABLET | Refills: 3 | Status: SHIPPED | OUTPATIENT
Start: 2024-04-25

## 2024-04-25 NOTE — TELEPHONE ENCOUNTER
No care due was identified.  Health [facility] Embedded Care Due Messages. Reference number: [id number].   4/25/2024 11:09:34 AM CDT

## 2024-04-29 DIAGNOSIS — I15.2 HYPERTENSION ASSOCIATED WITH DIABETES: ICD-10-CM

## 2024-04-29 DIAGNOSIS — E11.59 HYPERTENSION ASSOCIATED WITH DIABETES: ICD-10-CM

## 2024-04-29 RX ORDER — HYDROCHLOROTHIAZIDE 25 MG/1
25 TABLET ORAL
Qty: 90 TABLET | Refills: 3 | Status: SHIPPED | OUTPATIENT
Start: 2024-04-29

## 2024-04-29 NOTE — TELEPHONE ENCOUNTER
No care due was identified.  Health [facility] Embedded Care Due Messages. Reference number: [id number].   4/29/2024 11:20:36 AM CDT

## 2024-05-21 ENCOUNTER — OFFICE VISIT (OUTPATIENT)
Dept: INTERNAL MEDICINE | Facility: CLINIC | Age: 69
End: 2024-05-21
Payer: MEDICARE

## 2024-05-21 VITALS
WEIGHT: 224.88 LBS | HEART RATE: 61 BPM | BODY MASS INDEX: 36.14 KG/M2 | SYSTOLIC BLOOD PRESSURE: 136 MMHG | DIASTOLIC BLOOD PRESSURE: 70 MMHG | HEIGHT: 66 IN | OXYGEN SATURATION: 97 %

## 2024-05-21 DIAGNOSIS — Q45.3 PANCREATIC DUCTAL ABNORMALITY: ICD-10-CM

## 2024-05-21 DIAGNOSIS — I15.2 HYPERTENSION ASSOCIATED WITH DIABETES: ICD-10-CM

## 2024-05-21 DIAGNOSIS — N18.31 STAGE 3A CHRONIC KIDNEY DISEASE: ICD-10-CM

## 2024-05-21 DIAGNOSIS — I70.0 AORTIC ATHEROSCLEROSIS: ICD-10-CM

## 2024-05-21 DIAGNOSIS — Z87.442 PERSONAL HISTORY OF KIDNEY STONES: ICD-10-CM

## 2024-05-21 DIAGNOSIS — Z12.11 COLON CANCER SCREENING: ICD-10-CM

## 2024-05-21 DIAGNOSIS — E11.59 HYPERTENSION ASSOCIATED WITH DIABETES: ICD-10-CM

## 2024-05-21 DIAGNOSIS — Z12.31 ENCOUNTER FOR SCREENING MAMMOGRAM FOR BREAST CANCER: ICD-10-CM

## 2024-05-21 DIAGNOSIS — F43.23 ADJUSTMENT REACTION WITH ANXIETY AND DEPRESSION: ICD-10-CM

## 2024-05-21 DIAGNOSIS — K76.0 FATTY LIVER: ICD-10-CM

## 2024-05-21 DIAGNOSIS — M25.511 RIGHT SHOULDER PAIN, UNSPECIFIED CHRONICITY: ICD-10-CM

## 2024-05-21 DIAGNOSIS — Z00.00 ANNUAL PHYSICAL EXAM: Primary | ICD-10-CM

## 2024-05-21 DIAGNOSIS — L98.9 SKIN LESION: ICD-10-CM

## 2024-05-21 DIAGNOSIS — E11.65 TYPE 2 DIABETES MELLITUS WITH HYPERGLYCEMIA, WITHOUT LONG-TERM CURRENT USE OF INSULIN: ICD-10-CM

## 2024-05-21 PROCEDURE — 99397 PER PM REEVAL EST PAT 65+ YR: CPT | Mod: HCNC,S$GLB,, | Performed by: INTERNAL MEDICINE

## 2024-05-21 PROCEDURE — 3078F DIAST BP <80 MM HG: CPT | Mod: HCNC,CPTII,S$GLB, | Performed by: INTERNAL MEDICINE

## 2024-05-21 PROCEDURE — 99999 PR PBB SHADOW E&M-EST. PATIENT-LVL V: CPT | Mod: PBBFAC,HCNC,, | Performed by: INTERNAL MEDICINE

## 2024-05-21 PROCEDURE — 1101F PT FALLS ASSESS-DOCD LE1/YR: CPT | Mod: HCNC,CPTII,S$GLB, | Performed by: INTERNAL MEDICINE

## 2024-05-21 PROCEDURE — 1159F MED LIST DOCD IN RCRD: CPT | Mod: HCNC,CPTII,S$GLB, | Performed by: INTERNAL MEDICINE

## 2024-05-21 PROCEDURE — 3044F HG A1C LEVEL LT 7.0%: CPT | Mod: HCNC,CPTII,S$GLB, | Performed by: INTERNAL MEDICINE

## 2024-05-21 PROCEDURE — 3288F FALL RISK ASSESSMENT DOCD: CPT | Mod: HCNC,CPTII,S$GLB, | Performed by: INTERNAL MEDICINE

## 2024-05-21 PROCEDURE — 3075F SYST BP GE 130 - 139MM HG: CPT | Mod: HCNC,CPTII,S$GLB, | Performed by: INTERNAL MEDICINE

## 2024-05-21 PROCEDURE — 1126F AMNT PAIN NOTED NONE PRSNT: CPT | Mod: HCNC,CPTII,S$GLB, | Performed by: INTERNAL MEDICINE

## 2024-05-21 PROCEDURE — 3008F BODY MASS INDEX DOCD: CPT | Mod: HCNC,CPTII,S$GLB, | Performed by: INTERNAL MEDICINE

## 2024-05-21 PROCEDURE — 4010F ACE/ARB THERAPY RXD/TAKEN: CPT | Mod: HCNC,CPTII,S$GLB, | Performed by: INTERNAL MEDICINE

## 2024-05-21 NOTE — PROGRESS NOTES
"Subjective:       Patient ID: Maria Alejandra De La Rosa is a 69 y.o. female.    Chief Complaint: Annual Exam  This is a 69-year-old who presents today for physical she reports overall doing well continues to follow with endocrinology in her diabetes has been well controlled more recently she does try to watch her diet and keep an eye on her home numbers as well tries to be moderate portions that she eats.  She has had some issues with her right shoulder reports that she fell while back and had x-rays showed some osteoarthritis she does take Tylenol arthritis but reports now having decreased range of motion to the right shoulder that has been consistent she is unable to raise it up over her head fully.  She also has had some can skin lesions and she would like a referral to Dermatology.  She is due for her colon cancer screening would like to do a Cologuard instead of colonoscopy.  She had an episode of a kidney stone went to the ER in January CT showed the stone which she thinks she later passed she did have a follow-up with Urology they also showed some ductal dilation in the pancreas and we discussed GI referral she was agreeable    HPI  Review of Systems   Respiratory:  Negative for shortness of breath.    Cardiovascular:  Negative for chest pain.   Musculoskeletal:         Right shoulder pain decresasd rom    Skin:         Skin lesions    Neurological:         Mild tremors at times   Psychiatric/Behavioral:          Anxiety derpession/stable        Objective:    Blood pressure 136/70, pulse 61, height 5' 6" (1.676 m), weight 102 kg (224 lb 13.9 oz), SpO2 97%.   Physical Exam  Constitutional:       General: She is not in acute distress.  HENT:      Head: Normocephalic.      Mouth/Throat:      Pharynx: Oropharynx is clear.   Cardiovascular:      Rate and Rhythm: Normal rate and regular rhythm.      Heart sounds: Normal heart sounds. No murmur heard.     No friction rub. No gallop.      Comments: Breast : normal no masses or " tenderness      Pulmonary:      Effort: Pulmonary effort is normal. No respiratory distress.      Breath sounds: Normal breath sounds.   Abdominal:      General: Bowel sounds are normal.      Palpations: Abdomen is soft. There is no mass.      Tenderness: There is no abdominal tenderness.   Musculoskeletal:      Cervical back: Neck supple.      Comments: Decresaed rom right shoulder   Skin:     Findings: No erythema.      Comments: Skin taags  Seb keratosis  Skin lesions    Neurological:      Mental Status: She is alert.   Psychiatric:         Mood and Affect: Mood normal.         Assessment:       1. Annual physical exam    2. Skin lesion    3. Encounter for screening mammogram for breast cancer    4. Colon cancer screening    5. Type 2 diabetes mellitus with hyperglycemia, without long-term current use of insulin    6. Hypertension associated with diabetes    7. Right shoulder pain, unspecified chronicity    8. Pancreatic ductal abnormality    9. Stage 3a chronic kidney disease    10. Aortic atherosclerosis    11. Adjustment reaction with anxiety and depression    12. Fatty liver    13. Personal history of kidney stones        Plan:       Maria Alejandra was seen today for annual exam.    Diagnoses and all orders for this visit:    Annual physical exam  -     TSH; Future  -     CBC Auto Differential; Future  -     Basic Metabolic Panel; Future    Skin lesion referral placed  -     Ambulatory referral/consult to Dermatology; Future    Encounter for screening mammogram for breast cancer  -     Mammo Digital Screening Bilat w/ Evert; Future    Colon cancer screening  Discussed colon cancer screening Cologuard play  -     Cologuard Screening (Multitarget Stool DNA); Future  -     Cologuard Screening (Multitarget Stool DNA)    Type 2 diabetes mellitus with hyperglycemia, without long-term current use of insulin  Following with endocrinology remains on Ozempic and metaglip  Recent endocrine appt [facility]  6.2  -     TSH; Future  -      CBC Auto Differential; Future  -     Basic Metabolic Panel; Future    Hypertension associated with diabetes  Pressure has been controlled  -     TSH; Future  -     CBC Auto Differential; Future  -     Basic Metabolic Panel; Future    Right shoulder pain, unspecified chronicity  -     Ambulatory referral/consult to Orthopedics; Future    Pancreatic ductal abnormality  On ct in er discussed referral to gi for scheudling   -     Ambulatory referral/consult to Gastroenterology; Future    Stage 3a chronic kidney disease  Recent renal function blood pressure diabetes control and minimize anti-inflammatory medicines discussed    Aortic atherosclerosis  Continue atorvastatin will update labs and adjust if needed    Adjustment reaction with anxiety and depression  Remains on low-dose Zoloft    Fatty liver  On previous imaging will update labs    Personal history of kidney stones  She has seen urology no recurrance    Discussed rsv she will scheudle her annual eye exam recommended   Follow up 6 months

## 2024-05-22 ENCOUNTER — LAB VISIT (OUTPATIENT)
Dept: LAB | Facility: HOSPITAL | Age: 69
End: 2024-05-22
Attending: INTERNAL MEDICINE
Payer: MEDICARE

## 2024-05-22 DIAGNOSIS — I15.2 HYPERTENSION ASSOCIATED WITH DIABETES: ICD-10-CM

## 2024-05-22 DIAGNOSIS — Z00.00 ANNUAL PHYSICAL EXAM: ICD-10-CM

## 2024-05-22 DIAGNOSIS — E11.65 TYPE 2 DIABETES MELLITUS WITH HYPERGLYCEMIA, WITHOUT LONG-TERM CURRENT USE OF INSULIN: ICD-10-CM

## 2024-05-22 DIAGNOSIS — E11.59 HYPERTENSION ASSOCIATED WITH DIABETES: ICD-10-CM

## 2024-05-22 LAB
ALBUMIN SERPL BCP-MCNC: 3.5 G/DL (ref 3.5–5.2)
ALP SERPL-CCNC: 102 U/L (ref 55–135)
ALT SERPL W/O P-5'-P-CCNC: 19 U/L (ref 10–44)
ANION GAP SERPL CALC-SCNC: 10 MMOL/L (ref 8–16)
AST SERPL-CCNC: 20 U/L (ref 10–40)
BASOPHILS # BLD AUTO: 0.02 K/UL (ref 0–0.2)
BASOPHILS NFR BLD: 0.3 % (ref 0–1.9)
BILIRUB SERPL-MCNC: 0.6 MG/DL (ref 0.1–1)
BUN SERPL-MCNC: 14 MG/DL (ref 8–23)
CALCIUM SERPL-MCNC: 9.7 MG/DL (ref 8.7–10.5)
CHLORIDE SERPL-SCNC: 104 MMOL/L (ref 95–110)
CHOLEST SERPL-MCNC: 133 MG/DL (ref 120–199)
CHOLEST/HDLC SERPL: 2.8 {RATIO} (ref 2–5)
CO2 SERPL-SCNC: 28 MMOL/L (ref 23–29)
CREAT SERPL-MCNC: 1.1 MG/DL (ref 0.5–1.4)
DIFFERENTIAL METHOD BLD: NORMAL
EOSINOPHIL # BLD AUTO: 0.2 K/UL (ref 0–0.5)
EOSINOPHIL NFR BLD: 3.4 % (ref 0–8)
ERYTHROCYTE [DISTWIDTH] IN BLOOD BY AUTOMATED COUNT: 13.1 % (ref 11.5–14.5)
EST. GFR  (NO RACE VARIABLE): 53.7 ML/MIN/1.73 M^2
GLUCOSE SERPL-MCNC: 158 MG/DL (ref 70–110)
HCT VFR BLD AUTO: 38.7 % (ref 37–48.5)
HDLC SERPL-MCNC: 47 MG/DL (ref 40–75)
HDLC SERPL: 35.3 % (ref 20–50)
HGB BLD-MCNC: 12.7 G/DL (ref 12–16)
IMM GRANULOCYTES # BLD AUTO: 0.01 K/UL (ref 0–0.04)
IMM GRANULOCYTES NFR BLD AUTO: 0.2 % (ref 0–0.5)
LDLC SERPL CALC-MCNC: 62.6 MG/DL (ref 63–159)
LYMPHOCYTES # BLD AUTO: 1.4 K/UL (ref 1–4.8)
LYMPHOCYTES NFR BLD: 22.7 % (ref 18–48)
MCH RBC QN AUTO: 29.9 PG (ref 27–31)
MCHC RBC AUTO-ENTMCNC: 32.8 G/DL (ref 32–36)
MCV RBC AUTO: 91 FL (ref 82–98)
MONOCYTES # BLD AUTO: 0.7 K/UL (ref 0.3–1)
MONOCYTES NFR BLD: 11 % (ref 4–15)
NEUTROPHILS # BLD AUTO: 3.8 K/UL (ref 1.8–7.7)
NEUTROPHILS NFR BLD: 62.4 % (ref 38–73)
NONHDLC SERPL-MCNC: 86 MG/DL
NRBC BLD-RTO: 0 /100 WBC
PLATELET # BLD AUTO: 235 K/UL (ref 150–450)
PMV BLD AUTO: 9.7 FL (ref 9.2–12.9)
POTASSIUM SERPL-SCNC: 3.4 MMOL/L (ref 3.5–5.1)
PROT SERPL-MCNC: 6.7 G/DL (ref 6–8.4)
RBC # BLD AUTO: 4.25 M/UL (ref 4–5.4)
SODIUM SERPL-SCNC: 142 MMOL/L (ref 136–145)
TRIGL SERPL-MCNC: 117 MG/DL (ref 30–150)
TSH SERPL DL<=0.005 MIU/L-ACNC: 2.37 UIU/ML (ref 0.4–4)
WBC # BLD AUTO: 6.16 K/UL (ref 3.9–12.7)

## 2024-05-22 PROCEDURE — 80061 LIPID PANEL: CPT | Mod: HCNC | Performed by: INTERNAL MEDICINE

## 2024-05-22 PROCEDURE — 36415 COLL VENOUS BLD VENIPUNCTURE: CPT | Mod: HCNC | Performed by: INTERNAL MEDICINE

## 2024-05-22 PROCEDURE — 80053 COMPREHEN METABOLIC PANEL: CPT | Mod: HCNC | Performed by: INTERNAL MEDICINE

## 2024-05-22 PROCEDURE — 84443 ASSAY THYROID STIM HORMONE: CPT | Mod: HCNC | Performed by: INTERNAL MEDICINE

## 2024-05-22 PROCEDURE — 85025 COMPLETE CBC W/AUTO DIFF WBC: CPT | Mod: HCNC | Performed by: INTERNAL MEDICINE

## 2024-06-04 ENCOUNTER — TELEPHONE (OUTPATIENT)
Dept: DERMATOLOGY | Facility: CLINIC | Age: 69
End: 2024-06-04
Payer: MEDICARE

## 2024-06-04 ENCOUNTER — OFFICE VISIT (OUTPATIENT)
Dept: SPORTS MEDICINE | Facility: CLINIC | Age: 69
End: 2024-06-04
Payer: MEDICARE

## 2024-06-04 VITALS
HEART RATE: 70 BPM | DIASTOLIC BLOOD PRESSURE: 72 MMHG | WEIGHT: 225.38 LBS | SYSTOLIC BLOOD PRESSURE: 112 MMHG | BODY MASS INDEX: 36.22 KG/M2 | HEIGHT: 66 IN

## 2024-06-04 DIAGNOSIS — G89.29 CHRONIC RIGHT SHOULDER PAIN: Primary | ICD-10-CM

## 2024-06-04 DIAGNOSIS — M67.80 TENDINOSIS: ICD-10-CM

## 2024-06-04 DIAGNOSIS — M67.911 DYSFUNCTION OF RIGHT ROTATOR CUFF: ICD-10-CM

## 2024-06-04 DIAGNOSIS — S46.819S SPRAIN OF SUPRASPINATUS MUSCLE OR TENDON, UNSPECIFIED LATERALITY, SEQUELA: ICD-10-CM

## 2024-06-04 DIAGNOSIS — M25.511 RIGHT SHOULDER PAIN, UNSPECIFIED CHRONICITY: ICD-10-CM

## 2024-06-04 DIAGNOSIS — M19.011 OSTEOARTHRITIS OF RIGHT GLENOHUMERAL JOINT: ICD-10-CM

## 2024-06-04 DIAGNOSIS — M25.511 CHRONIC RIGHT SHOULDER PAIN: Primary | ICD-10-CM

## 2024-06-04 PROCEDURE — 4010F ACE/ARB THERAPY RXD/TAKEN: CPT | Mod: HCNC,CPTII,S$GLB, | Performed by: FAMILY MEDICINE

## 2024-06-04 PROCEDURE — 3044F HG A1C LEVEL LT 7.0%: CPT | Mod: HCNC,CPTII,S$GLB, | Performed by: FAMILY MEDICINE

## 2024-06-04 PROCEDURE — 20611 DRAIN/INJ JOINT/BURSA W/US: CPT | Mod: HCNC,RT,S$GLB, | Performed by: FAMILY MEDICINE

## 2024-06-04 PROCEDURE — 1101F PT FALLS ASSESS-DOCD LE1/YR: CPT | Mod: HCNC,CPTII,S$GLB, | Performed by: FAMILY MEDICINE

## 2024-06-04 PROCEDURE — 1159F MED LIST DOCD IN RCRD: CPT | Mod: HCNC,CPTII,S$GLB, | Performed by: FAMILY MEDICINE

## 2024-06-04 PROCEDURE — 99204 OFFICE O/P NEW MOD 45 MIN: CPT | Mod: 25,HCNC,S$GLB, | Performed by: FAMILY MEDICINE

## 2024-06-04 PROCEDURE — 3074F SYST BP LT 130 MM HG: CPT | Mod: HCNC,CPTII,S$GLB, | Performed by: FAMILY MEDICINE

## 2024-06-04 PROCEDURE — 1160F RVW MEDS BY RX/DR IN RCRD: CPT | Mod: HCNC,CPTII,S$GLB, | Performed by: FAMILY MEDICINE

## 2024-06-04 PROCEDURE — 3288F FALL RISK ASSESSMENT DOCD: CPT | Mod: HCNC,CPTII,S$GLB, | Performed by: FAMILY MEDICINE

## 2024-06-04 PROCEDURE — 3078F DIAST BP <80 MM HG: CPT | Mod: HCNC,CPTII,S$GLB, | Performed by: FAMILY MEDICINE

## 2024-06-04 PROCEDURE — 99999 PR PBB SHADOW E&M-EST. PATIENT-LVL IV: CPT | Mod: PBBFAC,HCNC,, | Performed by: FAMILY MEDICINE

## 2024-06-04 PROCEDURE — 3008F BODY MASS INDEX DOCD: CPT | Mod: HCNC,CPTII,S$GLB, | Performed by: FAMILY MEDICINE

## 2024-06-04 RX ADMIN — TRIAMCINOLONE ACETONIDE 40 MG: 40 INJECTION, SUSPENSION INTRA-ARTICULAR; INTRAMUSCULAR at 10:06

## 2024-06-04 NOTE — PROGRESS NOTES
HISTORY OF PRESENT ILLNESS   Maria Alejandra De La Rosa, a 69 y.o. female, presents today for evaluation of her right SHOULDER. Patient is left hand dominant.    Patient reports onset of chronic pain beginning 6 months ago. Patient reports no known injury or trauma. Pain is located along lateral aspect of SHOULDER. Pain is 5/10 at present & up to 10/10 with provacative activity including laying in bed.Pain is described as sharp. Patient states pain does not radiate.     Associated symptoms include decreased ROM. Pain is aggravated by activities above & occur occasionally. Symptoms do interfere with sleep. Patient reports pain & symptoms are staying the same. Patient reports no prior surgery to SHOULDER.     Prior treatment Maria Alejandra De La Rosa has tried   OTC Acetaminophen - Yes, tylenol arthritis  OTC NSAID - No   Rx NSAID - No   Rx Narcotic/Other - No   Brace - No   Injection - Cortisone - No   Injection - Biologics - No   Activity Modification - No  Physical Therapy - No   Home Exercise Program - No  Assistive Device - No  Other - topical creams    Review of systems (ROS):  A 10+ review of systems was performed with pertinent positives and negatives noted above in the history of present illness. Other systems were negative unless otherwise specified.    PHYSICAL EXAMINATION  General:  The patient is alert and oriented x 3. Mood is pleasant. Observation of ears, eyes and nose reveal no gross abnormalities. HEENT: NCAT, sclera anicteric. Lungs: Respirations are equal and unlabored.     SHOULDER EXAMINATION     OBSERVATION:     Swelling  none  Deformity  none   Discoloration  none   Scapular winging none   Scars   none  Atrophy  none    TENDERNESS / CREPITUS (T/C):          T/C      T/C   Clavicle   -/-  SUPRAspinatus    -/-     AC Jt.    -/-  INFRAspinatus  -/-    SC Jt.    -/-  Deltoid    -/-      G. Tuberosity  -/-  LH BICEP groove  -/-   Acromion:  -/-  Midline Neck   -/-     Scapular Spine -/-  Trapezium   -/-   SMA  Scapula  -/-  GH jt. line - post  -/-     Scapulothoracic  -/-         ROM:     Right shoulder   Left shoulder        AROM (PROM)   AROM (PROM)   FE    170° (175°)     170° (175°)     ER at 0°    60°  (65°)    60°  (65°)   ER at 90° ABD  90°  (90°)    90°  (90°)   IR at 90°  ABD   NA  (40°)     NA  (40°)      IR (spine level)   T10     T10    STRENGTH: (* = with pain) RIGHT SHOULDER  LEFT SHOULDER   SCAPTION   5/5    5/5    IR    5/5    5/5   ER    5/5    5/5   BICEPS   5/5    5/5   Deltoid    5/5    5/5     SIGNS:  Painful side       NEER   -   OROGERSS        -    MEMBRENO   -   SPEEDS        -   DROP ARM   -   BELLY PRESS       -    X-Body ADD    -   LIFT-OFF        -   HORNBLOWERS      -              STABILITY TESTING   RIGHT SHOULDER  LEFT SHOULDER     Translation     Anterior up face    up face    Posterior up face   up face    Sulcus  < 10mm   < 10 mm     Signs   Apprehension   neg     neg       Relocation   no change    no change      Jerk test  neg    neg    EXTREMITY NEURO-VASCULAR EXAM    Sensation grossly intact to light touch all dermatomal regions.    DTR 2+ Biceps, Triceps, BR and Negative Lucas sign   Grossly intact motor function at Elbow, Wrist and Hand   Distal pulses radial and ulnar 2+, brisk cap refill, symmetric.      NECK:  Painless FROM and spinous processes non-tender. Negative Spurlings sign.       Other Findings:    ASSESSMENT & PLAN   Assessment  #1 Osteoarthritis of glenohumeral joint, right, nondominant    W/ supraspinatus tendinosis    No evidence of neurologic pathology  No evidence of vascular pathology    Imaging studies reviewed:   X-ray shoulder, right, 11/14/23  X-ray elbow, right, 11/14/23    Plan    We discussed options including    Watchful waiting / relative rest    Physical therapy    Injection therapy Csi supraspin tend right  Csi iagh right   Consultation    The patient chooses As above   x = prescribed  CSI = corticosteroid injection  VSI = viscosupplement  injection  PRPI = platelet rich plasma injection  ia = intra articular  R = right  L = left  B = bilateral   nfSx = surgical consultation was recommended, but patient is not interested in consultation at this time    Physical Therapy        Formal (fPT), @ [facility] facility    Formal (fPT), @ [facility] facility        Homegoing (hgPT), per concurrent fPT recommendations    Homegoing (hgPT), per prior fPT recommendations    Homegoing (hgPT), handout provided        w/  (atPT)    [blank] = not prescribed  x = prescribed  b = prescribed, and begin as indicated  t = continue as indicated  r = prescribed, and restart as indicated  p = completed prior as indicated  hs = prescribed, and with high school   col = prescribed, and with college or university   nfPT = physical therapy was recommended, but patient is not interested in PT at this time    Activity (e.g. sports, work) restrictions    [blank] = as tolerated  pt = per physical therapist  at = per   NWB = non weight bearing on affected lower extremity, with crutches assistance for ambulation    Bracing    [blank] = not prescribed  r = recommended, but not fit with at todays visit  f = prescribed and fit with at todays visit  t = continue as indicated  d = d/c  p = as needed  rare = use on rare, as-needed basis; advised against chronic use    Pain management    [blank] = No prescription necessary. A handout detailing dosing of appropriate   over-the-counter musculoskeletal analgesics was made available to the patient.   m = meloxicam x 14 days  mp = 14 day course of meloxicam prescribed prior    Follow up 12 weeks    [blank] = as needed  [number] = in [number] weeks  CSI = for corticosteroid injection  VSI = for viscosupplement injection or injection series  PRP = for platelet rich plasma injection or injection series  MRI = after MRI imaging  ns = should surgical options be deferred (no surgery)  o =  appointment offered, deferred by patient    Should symptoms worsen or fail to resolve, consider    Revisiting the above options and / or MRI     Vocation:    Sister in law is a patient

## 2024-06-04 NOTE — TELEPHONE ENCOUNTER
----- Message from Destiny Esposito MA sent at 6/4/2024 11:16 AM CDT -----  Regarding: FW: Appt  Contact: [phone]    ----- Message -----  From: Whitley Lorenzana MA  Sent: 5/31/2024   4:17 PM CDT  To: Destiny Esposito MA  Subject: FW: Appt                                           ----- Message -----  From: Cecil Wade  Sent: 5/31/2024   3:51 PM CDT  To: [facility] Staff  Subject: Appt                                             Pt calling to speak with someone in provider office regards scheduling an appt for moles . No appt available. Please call pt back at  [phone]

## 2024-06-05 RX ORDER — TRIAMCINOLONE ACETONIDE 40 MG/ML
40 INJECTION, SUSPENSION INTRA-ARTICULAR; INTRAMUSCULAR
Status: DISCONTINUED | OUTPATIENT
Start: 2024-06-04 | End: 2024-06-05 | Stop reason: HOSPADM

## 2024-06-05 NOTE — PROCEDURES
"Large Joint Aspiration/Injection: R glenohumeral    Date/Time: 6/4/2024 10:30 AM    Performed by: Wu Jones MD  Authorized by: Wu Jones MD    Consent Done?:  Yes (Verbal)  Indications:  Pain  Site marked: the procedure site was marked    Timeout: prior to procedure the correct patient, procedure, and site was verified    Prep: patient was prepped and draped in usual sterile fashion      Details:  Needle Size:  22 G  Ultrasonic Guidance for needle placement?: Yes    Images are saved and documented.  Approach:  Posterior  Location:  Shoulder  Site:  R glenohumeral  Medications:  40 mg triamcinolone acetonide 40 mg/mL  Patient tolerance:  Patient tolerated the procedure well with no immediate complications     Description of ultrasound utilization for needle guidance:   Ultrasound guidance used for needle localization. Images saved and stored for documentation. The glenohumeral joint was visualized. Dynamic visualization of the 22g x 3.5" needle was continuous throughout the procedure.     "

## 2024-06-05 NOTE — PROCEDURES
"Tendon Origin    Date/Time: 6/4/2024 10:30 AM    Performed by: Wu Jones MD  Authorized by: Wu Jones MD    Consent Done?:  Yes (Verbal)  Timeout: prior to procedure the correct patient, procedure, and site was verified    Indications:  Pain  Site marked: the procedure site was marked    Timeout: prior to procedure the correct patient, procedure, and site was verified    Location: shoulder.  Prep: patient was prepped and draped in usual sterile fashion    Ultrasonic Guidance for Needle Placement?: Yes    Needle size:  25 G  Approach:  Posterolateral  Medications:  40 mg triamcinolone acetonide 40 mg/mL  Patient tolerance:  Patient tolerated the procedure well with no immediate complications   Supraspinatus tendon and tendon insertion injection RIGHT    Description of ultrasound utilization for needle guidance:   Ultrasound guidance used for needle localization. Images saved and stored for documentation. The supraspinatus muscle, myotendinous junction, and tendon insertion on the greater tuberosity of the humeral head were visualized. Dynamic visualization of the 22g x 1.5" needle was continuous throughout the procedure.    "

## 2024-06-10 ENCOUNTER — PATIENT MESSAGE (OUTPATIENT)
Dept: INTERNAL MEDICINE | Facility: CLINIC | Age: 69
End: 2024-06-10
Payer: MEDICARE

## 2024-06-10 LAB — NONINV COLON CA DNA+OCC BLD SCRN STL QL: NEGATIVE

## 2024-06-24 ENCOUNTER — HOSPITAL ENCOUNTER (OUTPATIENT)
Dept: RADIOLOGY | Facility: HOSPITAL | Age: 69
Discharge: HOME | End: 2024-06-24
Attending: INTERNAL MEDICINE
Payer: MEDICARE

## 2024-06-24 VITALS — WEIGHT: 220 LBS | BODY MASS INDEX: 35.51 KG/M2

## 2024-06-24 DIAGNOSIS — Z12.31 ENCOUNTER FOR SCREENING MAMMOGRAM FOR BREAST CANCER: ICD-10-CM

## 2024-06-24 PROCEDURE — 77067 SCR MAMMO BI INCL CAD: CPT | Mod: TC,HCNC

## 2024-06-27 ENCOUNTER — OFFICE VISIT (OUTPATIENT)
Dept: GASTROENTEROLOGY | Facility: CLINIC | Age: 69
End: 2024-06-27
Payer: MEDICARE

## 2024-06-27 VITALS
DIASTOLIC BLOOD PRESSURE: 58 MMHG | HEIGHT: 66 IN | BODY MASS INDEX: 36.56 KG/M2 | WEIGHT: 227.5 LBS | HEART RATE: 66 BPM | SYSTOLIC BLOOD PRESSURE: 125 MMHG

## 2024-06-27 DIAGNOSIS — R10.9 ABDOMINAL CRAMPING: Primary | ICD-10-CM

## 2024-06-27 DIAGNOSIS — Q45.3 PANCREATIC DUCTAL ABNORMALITY: ICD-10-CM

## 2024-06-27 PROCEDURE — 99999 PR PBB SHADOW E&M-EST. PATIENT-LVL IV: CPT | Mod: PBBFAC,HCNC,, | Performed by: INTERNAL MEDICINE

## 2024-06-27 NOTE — PROGRESS NOTES
SUBJECTIVE:         Chief Complaint:   Chief Complaint   Patient presents with    Initial Visit    Pancreatic ductal abnormality    Constipation       History of Present Illness:  Patient is a 69 y.o. female presents with  an episode of abdominal pain in January.  The symptoms include significant periumbilical pain that lasted for a few hours associated with nausea and vomiting.  She presented herself to the emergency room where she had a CT scan of the abdomen.  The pain resolved fairly quickly after her visit to the emergency room and she was discharged.  Her primary care physician noted the CT scan findings as pancreatic duct stone and referred her to us to discuss management..   She currently has no abdominal pain no steatorrhea or diarrhea.  In fact she is a bit constipated.  She has no significant fructose intolerance.    OBJECTIVE:     Vital Signs (Most Recent)  Pulse: 66 (06/27/24 1123)  BP: (!) 125/58 (06/27/24 1123)    General: well developed, well nourished    Diagnostic Results:  CT: Reviewed      ASSESSMENT/PLAN:     Her CT scan demonstrated pancreatic duct stone in the mid duct of the pancreas with likely atrophy of the distal body and tail of the pancreas.  There is air in her biliary tree suggesting a prior endoscopic therapy though she can not recall such.  Given the absence of current symptoms of abdominal pain or steatorrhea in her current state of health I would not recommend further endoscopic intervention upon the pancreatic duct stones.  Should she have a change in her symptoms with increasing pain or chronicity or recurrence of her pain we would then reconsider an ERCP pancreatic duct stone removal.  This procedure carries approximately 30-40% chance of pancreatitis in and of itself may not provide her an improvement her symptoms.  Therefore we will withhold it until she has a recurrence of problems.    I suggest she increase her fiber intake given her constipation and start Colace 100 mg  by mouth over the counter.

## 2024-07-05 ENCOUNTER — PATIENT MESSAGE (OUTPATIENT)
Dept: GASTROENTEROLOGY | Facility: CLINIC | Age: 69
End: 2024-07-05
Payer: MEDICARE

## 2024-07-30 ENCOUNTER — PATIENT OUTREACH (OUTPATIENT)
Dept: ADMINISTRATIVE | Facility: HOSPITAL | Age: 69
End: 2024-07-30
Payer: MEDICARE

## 2024-07-30 ENCOUNTER — OFFICE VISIT (OUTPATIENT)
Dept: DERMATOLOGY | Facility: CLINIC | Age: 69
End: 2024-07-30
Payer: MEDICARE

## 2024-07-30 DIAGNOSIS — D48.9 NEOPLASM OF UNCERTAIN BEHAVIOR: Primary | ICD-10-CM

## 2024-07-30 DIAGNOSIS — Z12.83 SCREENING EXAM FOR SKIN CANCER: ICD-10-CM

## 2024-07-30 DIAGNOSIS — D22.9 MULTIPLE BENIGN NEVI: ICD-10-CM

## 2024-07-30 DIAGNOSIS — L82.0 INFLAMED SEBORRHEIC KERATOSIS: ICD-10-CM

## 2024-07-30 DIAGNOSIS — L57.0 AK (ACTINIC KERATOSIS): ICD-10-CM

## 2024-07-30 DIAGNOSIS — L82.1 SK (SEBORRHEIC KERATOSIS): ICD-10-CM

## 2024-07-30 PROCEDURE — 4010F ACE/ARB THERAPY RXD/TAKEN: CPT | Mod: HCNC,CPTII,S$GLB, | Performed by: DERMATOLOGY

## 2024-07-30 PROCEDURE — 11102 TANGNTL BX SKIN SINGLE LES: CPT | Mod: HCNC,XS,S$GLB, | Performed by: DERMATOLOGY

## 2024-07-30 PROCEDURE — 1126F AMNT PAIN NOTED NONE PRSNT: CPT | Mod: HCNC,CPTII,S$GLB, | Performed by: DERMATOLOGY

## 2024-07-30 PROCEDURE — 1159F MED LIST DOCD IN RCRD: CPT | Mod: HCNC,CPTII,S$GLB, | Performed by: DERMATOLOGY

## 2024-07-30 PROCEDURE — 1101F PT FALLS ASSESS-DOCD LE1/YR: CPT | Mod: HCNC,CPTII,S$GLB, | Performed by: DERMATOLOGY

## 2024-07-30 PROCEDURE — 3044F HG A1C LEVEL LT 7.0%: CPT | Mod: HCNC,CPTII,S$GLB, | Performed by: DERMATOLOGY

## 2024-07-30 PROCEDURE — 17110 DESTRUCTION B9 LES UP TO 14: CPT | Mod: HCNC,S$GLB,, | Performed by: DERMATOLOGY

## 2024-07-30 PROCEDURE — 99203 OFFICE O/P NEW LOW 30 MIN: CPT | Mod: 25,HCNC,S$GLB, | Performed by: DERMATOLOGY

## 2024-07-30 PROCEDURE — 11103 TANGNTL BX SKIN EA SEP/ADDL: CPT | Mod: HCNC,XS,S$GLB, | Performed by: DERMATOLOGY

## 2024-07-30 PROCEDURE — 99999 PR PBB SHADOW E&M-EST. PATIENT-LVL IV: CPT | Mod: PBBFAC,HCNC,, | Performed by: DERMATOLOGY

## 2024-07-30 PROCEDURE — 1160F RVW MEDS BY RX/DR IN RCRD: CPT | Mod: HCNC,CPTII,S$GLB, | Performed by: DERMATOLOGY

## 2024-07-30 PROCEDURE — 17000 DESTRUCT PREMALG LESION: CPT | Mod: HCNC,XS,S$GLB, | Performed by: DERMATOLOGY

## 2024-07-30 PROCEDURE — 3288F FALL RISK ASSESSMENT DOCD: CPT | Mod: HCNC,CPTII,S$GLB, | Performed by: DERMATOLOGY

## 2024-07-30 PROCEDURE — 88305 TISSUE EXAM BY PATHOLOGIST: CPT | Mod: 59,HCNC | Performed by: PATHOLOGY

## 2024-07-30 NOTE — PROGRESS NOTES
Subjective:      Patient ID:  Maria Alejandra De La Rosa is a 69 y.o. female who presents for   Chief Complaint   Patient presents with    Skin Check     Tbse      Patient here for Total Body Skin Exam    Last seen by dermatologist: Unsure     Unsure - personal history of atypical moles removed  No - personal history of MM   No - family history of MM  Yes - childhood blistering sunburns  No - tanning bed use  No - personal history of NMSC    Patient with specific complaint of lesion(s)  Location: left temple  Duration: years  Symptoms: none   Relieving factors/Previous treatments: none    Patient with new area of concern:   Location: chest   Previous treatments: none    Patient with new area of concern:   Location: right shoulder +dry, scaly   Previous treatments: none                Review of Systems   Skin:  Positive for activity-related sunscreen use. Negative for daily sunscreen use, recent sunburn and wears hat.   Hematologic/Lymphatic: Does not bruise/bleed easily.       Objective:   Physical Exam   Constitutional: She appears well-developed and well-nourished. No distress.   Neurological: She is alert and oriented to person, place, and time. She is not disoriented.   Psychiatric: She has a normal mood and affect.   Skin:   Areas Examined (abnormalities noted in diagram):   Scalp / Hair Palpated and Inspected  Head / Face Inspection Performed  Neck Inspection Performed  Chest / Axilla Inspection Performed  Back Inspection Performed  RUE Inspected  LUE Inspection Performed                 Diagram Legend     Erythematous scaling macule/papule c/w actinic keratosis       Vascular papule c/w angioma      Pigmented verrucoid papule/plaque c/w seborrheic keratosis      Yellow umbilicated papule c/w sebaceous hyperplasia      Irregularly shaped tan macule c/w lentigo     1-2 mm smooth white papules consistent with Milia      Movable subcutaneous cyst with punctum c/w epidermal inclusion cyst      Subcutaneous movable cyst c/w  pilar cyst      Firm pink to brown papule c/w dermatofibroma      Pedunculated fleshy papule(s) c/w skin tag(s)      Evenly pigmented macule c/w junctional nevus     Mildly variegated pigmented, slightly irregular-bordered macule c/w mildly atypical nevus      Flesh colored to evenly pigmented papule c/w intradermal nevus       Pink pearly papule/plaque c/w basal cell carcinoma      Erythematous hyperkeratotic cursted plaque c/w SCC      Surgical scar with no sign of skin cancer recurrence      Open and closed comedones      Inflammatory papules and pustules      Verrucoid papule consistent consistent with wart     Erythematous eczematous patches and plaques     Dystrophic onycholytic nail with subungual debris c/w onychomycosis     Umbilicated papule    Erythematous-base heme-crusted tan verrucoid plaque consistent with inflamed seborrheic keratosis     Erythematous Silvery Scaling Plaque c/w Psoriasis     See annotation                    Assessment / Plan:      Pathology Orders:       Normal Orders This Visit    Specimen to Pathology, Dermatology     Questions:    Procedure Type: Dermatology and skin neoplasms    Number of Specimens: 2    ------------------------: -------------------------    Spec 1 Procedure: Biopsy    Spec 1 Clinical Impression: r/o bcc    Spec 1 Source: left upper forehead    ------------------------: -------------------------    Spec 2 Procedure: Biopsy    Spec 2 Clinical Impression: r/o bcc    Spec 2 Source: right cheekbone    Release to patient: Immediate    Release to patient:           Neoplasm of uncertain behavior  -     Ambulatory referral/consult to Dermatology  -     Specimen to Pathology, Dermatology  Shave biopsy procedure note:    Shave biopsy x 2 performed after verbal consent including risk of infection, scar, recurrence, need for additional treatment of site. Area prepped with alcohol, anesthetized with approximately 1.0cc of 1% lidocaine with epinephrine. Lesional tissue  shaved with razor blade. Hemostasis achieved with application of aluminum chloride followed by hyfrecation. No complications. Dressing applied. Wound care explained.     If biopsy positive for malignancy, refer to Dr. Soler for Mohs surgery consultation.      AK (actinic keratosis)  Cryosurgery Procedure Note    Verbal consent from the patient is obtained including, but not limited to, risk of hypopigmentation/hyperpigmentation, scar, recurrence of lesion. The patient is aware of the precancerous quality and need for treatment of these lesions. Liquid nitrogen cryosurgery is applied to the 1 actinic keratoses, as detailed in the physical exam, to produce a freeze injury. The patient is aware that blisters may form and is instructed on wound care with gentle cleansing and use of vaseline ointment to keep moist until healed. The patient is supplied a handout on cryosurgery and is instructed to call if lesions do not completely resolve.     Inflamed seborrheic keratosis   Procedure note for destruction via hyfrecation and curettage:    Verbal consent obtained. Risks of recurrence and scarring discussed.   2 lesions cleaned with alcohol and anesthetized with 1% lidocaine with epinephrine. Areas then lightly hyfrecated and curetted to remove gross lesion. Hemostasis achieved with aluminum chloride application. No complications.   Areas dressed with Vaseline jelly and bandage. Wound care discussed.        SK (seborrheic keratosis)  These are benign inherited growths without a malignant potential. Reassurance given to patient. No treatment is necessary.      Multiple benign nevi   - minor problem and chronic.   Reassurance given to patient. No treatment necessary.      Screening exam for skin cancer  Upper body skin examination performed today including at least 6 points as noted in physical examination. Suspicious lesions noted.    Recommend daily sun protection/avoidance and use of at least SPF 30, broad spectrum  sunscreen (OTC drug).             No follow-ups on file.

## 2024-07-30 NOTE — PATIENT INSTRUCTIONS
Shave Biopsy Wound Care    Your doctor has performed a shave biopsy today.  A band aid and vaseline ointment has been placed over the site.  This should remain in place for NO LONGER THAN 48 hours.  It is fine to remove the bandaid after 24 hours, if the area is no longer bleeding. It is recommended that you keep the area dry (do not wet)) for the first 24 hours.  After 24 hours, wash the area with warm soap and water and apply Vaseline jelly.  Many patients prefer to use Neosporin or Bacitracin ointment.  This is acceptable; however, know that you can develop an allergy to this medication even if you have used it safely for years.  It is important to keep the area moist.  Letting it dry out and get air slows healing time, and will worsen the scar.        If you notice increasing redness, tenderness, pain, or yellow drainage at the biopsy site, please notify your doctor.  These are signs of an infection.    If your biopsy site is bleeding, apply firm pressure for 15 minutes straight.  Repeat for another 15 minutes, if it is still bleeding.   If the surgical site continues to bleed, then please contact your doctor.      For [facility] users:   You will receive your biopsy results in [facility] as soon as they are available. Please be assured that your physician/provider will review your results and will then determine what further treatment, evaluation, or planning is required. You should be contacted by your physician's/provider's office within 5 business days of receiving your results; If not, please reach out to directly. This is one more way [employer] is putting you first.     [facility] [place] [address]/ [phone] FAX/ [email or web address]         CRYOSURGERY      Your doctor has used a method called cryosurgery to treat your skin condition. Cryosurgery refers to the use of very cold substances to treat a variety of skin conditions such as warts, pre-skin cancers, molluscum  contagiosum, sun spots, and several benign growths. The substance we use in cryosurgery is liquid nitrogen and is so cold (-195 degrees Celsius) that is burns when administered.     Following treatment in the office, the skin may immediately burn and become red. You may find the area around the lesion is affected as well. It is sometimes necessary to treat not only the lesion, but a small area of the surrounding normal skin to achieve a good response.     A blister, and even a blood filled blister, may form after treatment.   This is a normal response. If the blister is painful, it is acceptable to sterilize a needle and with rubbing alcohol and gently pop the blister. It is important that you gently wash the area with soap and warm water as the blister fluid may contain wart virus if a wart was treated. Do no remove the roof of the blister.     The area treated can take anywhere from 1-3 weeks to heal. Healing time depends on the kind skin lesion treated, the location, and how aggressively the lesion was treated. It is recommended that the areas treated are covered with Vaseline or bacitracin ointment and a band-aid. If a band-aid is not practical, just ointment applied several times per day will do. Keeping these areas moist will speed the healing time.    Treatment with liquid nitrogen can leave a scar. In dark skin, it may be a light or dark scar, in light skin it may be a white or pink scar. These will generally fade with time, but may never go away completely.     If you have any concerns after your treatment, please feel free to call the office.       [facility] [place] [address]/ [phone] FAX/ [email or web address]     Wound Care    A band aid and vaseline ointment has been placed over the site.  This should remain in place for 24 hours.  It is recommended that you keep the area dry for the first 24 hours.  After 24 hours, you may remove the band aid and wash the area with warm  soap and water and apply Vaseline jelly.  Many patients prefer to use Neosporin or Bacitracin ointment.  This is acceptable; however, know that you can develop an allergy to this medication even if you have used it safely for years.  It is important to keep the area moist.  Letting it dry out and get air slows healing time, and will worsen the scar.  Band aid is optional after first 24 hours.      If you notice increasing redness, tenderness, pain, or yellow drainage at the site, please notify your doctor.  These are signs of an infection.    If your site is bleeding, apply firm pressure for 15 minutes straight.  Repeat for another 15 minutes, if it is still bleeding.   If the surgical site continues to bleed, then please contact your doctor.      [address] [place] [address]/ [phone] FAX/ [email or web address]

## 2024-08-01 LAB
FINAL PATHOLOGIC DIAGNOSIS: NORMAL
GROSS: NORMAL
Lab: NORMAL
MICROSCOPIC EXAM: NORMAL

## 2024-08-02 ENCOUNTER — TELEPHONE (OUTPATIENT)
Dept: DERMATOLOGY | Facility: CLINIC | Age: 69
End: 2024-08-02
Payer: MEDICARE

## 2024-08-02 ENCOUNTER — PATIENT MESSAGE (OUTPATIENT)
Dept: DERMATOLOGY | Facility: CLINIC | Age: 69
End: 2024-08-02
Payer: MEDICARE

## 2024-08-02 NOTE — TELEPHONE ENCOUNTER
Spoke with Cassie patients daughter about patient having Mohs with Dr. Macdonald instead of Perry Soler. Patient stated she didn't want to go to [place] for Mohs. I explained that once Dr. Macdonald looks at path and photo we would call to schedule surgery appointment.            ----- Message from Fawn Downing sent at 8/2/2024  8:11 AM CDT -----  Regarding: Appt  Contact: Cassie [phone]  Cassie/ daughter is calling to schedule pt for a appt for skin cancer please call

## 2024-08-02 NOTE — PROGRESS NOTES
1. Skin, left upper forehead, shave biopsy:  - BASAL CELL CARCINOMA WITH MIXED SUPERFICIAL AND NODULAR GROWTH PATTERN.  - THE TUMOR EXTENDS TO THE DEEP AND LATERAL BIOPSY MARGINS.      2. Skin, right cheek bone, shave biopsy:  - BASAL CELL CARCINOMA WITH MIXED SUPERFICIAL AND NODULAR GROWTH PATTERN.  - THE TUMOR EXTENDS TO THE DEEP AND LATERAL BIOPSY MARGINS.        Recommend patient to Dr. Soler for Mohs surgery consultation x 2. Picture in chart.   Items required for patient referral to Perry Soler MD L.  Copy of Pathology Report  2.  Demographic Sheet with patient phone number and insurance information  3.  Preferably a COLOR photo of the area of the biopsy and any progress notes from the biopsy visit  4.  A point of contact at the [facility] facility referring the patient that understands the referral and could assist if 1-3 above is not received.    All of this information can be faxed in a standard fashion to [phone].    Perry Soler M.D. [address] [address] [phone] fax      F/u with me in 6 months.

## 2024-08-05 ENCOUNTER — TELEPHONE (OUTPATIENT)
Dept: DERMATOLOGY | Facility: CLINIC | Age: 69
End: 2024-08-05
Payer: MEDICARE

## 2024-08-22 ENCOUNTER — PATIENT MESSAGE (OUTPATIENT)
Dept: ADMINISTRATIVE | Facility: HOSPITAL | Age: 69
End: 2024-08-22
Payer: MEDICARE

## 2024-09-03 ENCOUNTER — LAB VISIT (OUTPATIENT)
Dept: LAB | Facility: HOSPITAL | Age: 69
End: 2024-09-03
Attending: HOSPITALIST
Payer: MEDICARE

## 2024-09-03 DIAGNOSIS — E11.65 TYPE 2 DIABETES MELLITUS WITH HYPERGLYCEMIA, WITHOUT LONG-TERM CURRENT USE OF INSULIN: ICD-10-CM

## 2024-09-03 LAB
ANION GAP SERPL CALC-SCNC: 11 MMOL/L (ref 8–16)
BUN SERPL-MCNC: 15 MG/DL (ref 8–23)
CALCIUM SERPL-MCNC: 9.8 MG/DL (ref 8.7–10.5)
CHLORIDE SERPL-SCNC: 103 MMOL/L (ref 95–110)
CO2 SERPL-SCNC: 26 MMOL/L (ref 23–29)
CREAT SERPL-MCNC: 1.1 MG/DL (ref 0.5–1.4)
EST. GFR  (NO RACE VARIABLE): 53.7 ML/MIN/1.73 M^2
ESTIMATED AVG GLUCOSE: 126 MG/DL (ref 68–131)
GLUCOSE SERPL-MCNC: 102 MG/DL (ref 70–110)
HBA1C MFR BLD: 6 % (ref 4–5.6)
POTASSIUM SERPL-SCNC: 3.8 MMOL/L (ref 3.5–5.1)
SODIUM SERPL-SCNC: 140 MMOL/L (ref 136–145)

## 2024-09-03 PROCEDURE — 83036 HEMOGLOBIN GLYCOSYLATED A1C: CPT | Mod: HCNC | Performed by: HOSPITALIST

## 2024-09-03 PROCEDURE — 36415 COLL VENOUS BLD VENIPUNCTURE: CPT | Mod: HCNC | Performed by: HOSPITALIST

## 2024-09-03 PROCEDURE — 80048 BASIC METABOLIC PNL TOTAL CA: CPT | Mod: HCNC | Performed by: HOSPITALIST

## 2024-09-04 ENCOUNTER — PATIENT OUTREACH (OUTPATIENT)
Dept: ADMINISTRATIVE | Facility: HOSPITAL | Age: 69
End: 2024-09-04
Payer: MEDICARE

## 2024-09-04 ENCOUNTER — PATIENT MESSAGE (OUTPATIENT)
Dept: ADMINISTRATIVE | Facility: HOSPITAL | Age: 69
End: 2024-09-04
Payer: MEDICARE

## 2024-09-04 NOTE — PROGRESS NOTES
Health Maintenance Due   Topic Date Due    RSV Vaccine (Age 60+ and Pregnant patients) (1 - 1-dose 60+ series) Never done    Eye Exam  10/11/2023    Influenza Vaccine (1) 09/01/2024    COVID-19 Vaccine (1 - 2023-24 season) Never done     Triggered LINKS and reconciled immunizations. Updated Care Everywhere. Pt responded to campaigns outreach asking to schedule annual eye exam- referral to optometry previously placed; pt contacted to schedule. Chart review completed.

## 2024-09-05 DIAGNOSIS — F33.2 SEVERE EPISODE OF RECURRENT MAJOR DEPRESSIVE DISORDER, WITHOUT PSYCHOTIC FEATURES: ICD-10-CM

## 2024-09-05 RX ORDER — SERTRALINE HYDROCHLORIDE 25 MG/1
25 TABLET, FILM COATED ORAL
Qty: 90 TABLET | Refills: 3 | Status: SHIPPED | OUTPATIENT
Start: 2024-09-05

## 2024-09-05 NOTE — TELEPHONE ENCOUNTER
No care due was identified.  [facility] Embedded Care Due Messages. Reference number: [id number].   9/05/2024 2:02:34 AM CDT

## 2024-09-05 NOTE — TELEPHONE ENCOUNTER
Refill Routing Note   Medication(s) are not appropriate for processing by [facility] for the following reason(s):        No active prescription written by provider    [facility] action(s):  Defer               Appointments  past 12m or future 3m with PCP    Date Provider   Last Visit   5/21/2024 Caryn Ambrosio MD   Next Visit   11/22/2024 Caryn Ambrosio MD   ED visits in past 90 days: 0        Note composed:11:36 AM 09/05/2024

## 2024-09-09 ENCOUNTER — OFFICE VISIT (OUTPATIENT)
Dept: ENDOCRINOLOGY | Facility: CLINIC | Age: 69
End: 2024-09-09
Payer: MEDICARE

## 2024-09-09 VITALS
BODY MASS INDEX: 36.99 KG/M2 | HEART RATE: 66 BPM | DIASTOLIC BLOOD PRESSURE: 86 MMHG | WEIGHT: 229.19 LBS | SYSTOLIC BLOOD PRESSURE: 122 MMHG

## 2024-09-09 DIAGNOSIS — E11.65 TYPE 2 DIABETES MELLITUS WITH HYPERGLYCEMIA, WITHOUT LONG-TERM CURRENT USE OF INSULIN: ICD-10-CM

## 2024-09-09 DIAGNOSIS — E66.9 DIABETES MELLITUS TYPE 2 IN OBESE: Chronic | ICD-10-CM

## 2024-09-09 DIAGNOSIS — E11.9 CONTROLLED TYPE 2 DIABETES MELLITUS WITHOUT COMPLICATION, WITHOUT LONG-TERM CURRENT USE OF INSULIN: Primary | ICD-10-CM

## 2024-09-09 DIAGNOSIS — E66.01 CLASS 2 SEVERE OBESITY DUE TO EXCESS CALORIES WITH SERIOUS COMORBIDITY AND BODY MASS INDEX (BMI) OF 35.0 TO 35.9 IN ADULT: ICD-10-CM

## 2024-09-09 DIAGNOSIS — E78.00 HYPERCHOLESTEROLEMIA: ICD-10-CM

## 2024-09-09 DIAGNOSIS — E11.69 DIABETES MELLITUS TYPE 2 IN OBESE: Chronic | ICD-10-CM

## 2024-09-09 PROCEDURE — 4010F ACE/ARB THERAPY RXD/TAKEN: CPT | Mod: HCNC,CPTII,S$GLB, | Performed by: HOSPITALIST

## 2024-09-09 PROCEDURE — 3008F BODY MASS INDEX DOCD: CPT | Mod: HCNC,CPTII,S$GLB, | Performed by: HOSPITALIST

## 2024-09-09 PROCEDURE — 3074F SYST BP LT 130 MM HG: CPT | Mod: HCNC,CPTII,S$GLB, | Performed by: HOSPITALIST

## 2024-09-09 PROCEDURE — 99999 PR PBB SHADOW E&M-EST. PATIENT-LVL IV: CPT | Mod: PBBFAC,HCNC,, | Performed by: HOSPITALIST

## 2024-09-09 PROCEDURE — 3061F NEG MICROALBUMINURIA REV: CPT | Mod: HCNC,CPTII,S$GLB, | Performed by: HOSPITALIST

## 2024-09-09 PROCEDURE — 99214 OFFICE O/P EST MOD 30 MIN: CPT | Mod: HCNC,S$GLB,, | Performed by: HOSPITALIST

## 2024-09-09 PROCEDURE — 1160F RVW MEDS BY RX/DR IN RCRD: CPT | Mod: HCNC,CPTII,S$GLB, | Performed by: HOSPITALIST

## 2024-09-09 PROCEDURE — 1101F PT FALLS ASSESS-DOCD LE1/YR: CPT | Mod: HCNC,CPTII,S$GLB, | Performed by: HOSPITALIST

## 2024-09-09 PROCEDURE — 3288F FALL RISK ASSESSMENT DOCD: CPT | Mod: HCNC,CPTII,S$GLB, | Performed by: HOSPITALIST

## 2024-09-09 PROCEDURE — 3044F HG A1C LEVEL LT 7.0%: CPT | Mod: HCNC,CPTII,S$GLB, | Performed by: HOSPITALIST

## 2024-09-09 PROCEDURE — 3066F NEPHROPATHY DOC TX: CPT | Mod: HCNC,CPTII,S$GLB, | Performed by: HOSPITALIST

## 2024-09-09 PROCEDURE — 3079F DIAST BP 80-89 MM HG: CPT | Mod: HCNC,CPTII,S$GLB, | Performed by: HOSPITALIST

## 2024-09-09 PROCEDURE — 1159F MED LIST DOCD IN RCRD: CPT | Mod: HCNC,CPTII,S$GLB, | Performed by: HOSPITALIST

## 2024-09-09 RX ORDER — GLIPIZIDE AND METFORMIN HCL 5; 500 MG/1; MG/1
1 TABLET, FILM COATED ORAL
Qty: 180 TABLET | Refills: 3 | Status: SHIPPED | OUTPATIENT
Start: 2024-09-09 | End: 2025-09-09

## 2024-09-09 NOTE — ASSESSMENT & PLAN NOTE
- Diabetes is at goal, given most current A1C, and recent hospital admission for hyperglycemia  - Goal A1C for patient is 7%  - diabetes is complicated by dietary indiscretion, poor insight to diabetes> doing better  - Diabetic supplies/medications were reviewed this visit to ensure continue steady supplies  - Advised patient to get routine feet care, routine eye exam, plus routine maintenance screening  - Diabetes goal including glucose glucose and A1C goals discussed  - weight loss noted    Plan  - Adjustment made: Ozempic 1.0 mg once a week injection, Pharmacy Assistance to help with cost of Ozempic  - Continue glipizide/metformin combination pill, 5/500 mg 1 tablet twice a day, monitor for hypoglycemia  - Encouragement of dietary modification, portion size control, decreasing carbohydrates intake  - Encouragement of dietary changes and weight loss  - Advised pt to check glucoses regularly, asked to filled glucose log and bring back for review at next office visit  - check lab work in 4 months re-evaluate  - Clear written instruction given on AVS. Follow up as scheduled

## 2024-09-09 NOTE — ASSESSMENT & PLAN NOTE
- lipid panel review today  - ASCVD Risk below: Statin: Taking  The 10-year ASCVD risk score (Tea LAU, et al., 2019) is: 21.6%    Values used to calculate the score:      Age: 69 years      Sex: Female      Is Non- : No      Diabetic: Yes      Tobacco smoker: No      Systolic Blood Pressure: 122 mmHg      Is BP treated: Yes      HDL Cholesterol: 47 mg/dL      Total Cholesterol: 133 mg/dL

## 2024-09-09 NOTE — ASSESSMENT & PLAN NOTE
- Body mass index is 36.99 kg/m².  - dietary discussion as above  - continue to monitor weight  - continue Ozempic

## 2024-09-09 NOTE — ASSESSMENT & PLAN NOTE
- Body mass index is 36.99 kg/m².  - dietary discussion as above  - encourage to continue weight loss

## 2024-09-09 NOTE — PROGRESS NOTES
Subjective:      Patient ID: Maria Alejandra De La Rosa is a 69 y.o. female presented to [facility] on 9/9/2024.  Chief Complaint:  Diabetes    History of Present Illness: Maria Alejandra De La Rosa is a 69 y.o. female here for  type 2 diabetes  Other significant past medical history:  Obesity, hypertension      Diabetes mellitus Type 2  - Diagnosed w/ DM: in 2014  - Diabetes Education: No  - Patient with recent hospital admission 1/6/2023 to 1/8/2023 for uncontrolled hyperglycemia and possible right lower lobe pneumonia. Her glucose earlier in clinic was greater than 400 in the last week she has not been monitoring her glucose, she does not take insulin. ED workup is notable for mild leukocytosis hypokalemia, ISAI is present, hyperglycemic workup is negative for serum ketones and patient has a non acidotic pH.  A1c found to be 13.0%.  - Doing better over the last few years, [id number]    Interval history:  Patient is here for diabetes management A1c 6.0%.  Patient has been making dietary changes at home. A1c improvement  Doing well. Getting Ozempic 1 mg  from [facility].   Glipizide/metformin b.I.d.  Some constipation  No longer having hypoglycemia  Current weight: 229 , previous weight 223, 222, 231      Current reported meds:    Ozempic 1.0 mg once a week injection  Glipizide/metformin 5/500 mg 1 tablet twice a day  Previous meds tried:              Trulicity >> too expensive, last used 6/2022              Glimepiride 4mg daily   Januvia 50mg daily  Home glucose checks: checks daily , Logs reviewed  - no obvious hypoglycemia.  Glucose trend much better over the last month  166  142  126  109  103  86  117    Diet/Exercise:   - Eating 3x meals per day, eating mostly preprepared TV dinner              - Drink: water, crystral light  Sleeping:   - Any difficulty falling asleep, staying asleep, nighttime coughing, or partner complaints of snoring?  No  - What time do you usually go to bed? What time do you usually wake up?  "10-6  Weight trend: stable  Diabetes Related Hospitalization:  No  Hx of pancreatitis: No, denies  Family history of diabetes: Yes  Occupation:  Retired    Eye exam current (within one year): yes, DR: no, unknown  Reports cuts or ulcers on feet:   Denies, has podiatrist  Statin: Taking, ACE/ARB: Taking    Diabetes lab work  Lab Results   Component Value Date    HGBA1C 6.0 (H) 09/03/2024    HGBA1C 6.2 (H) 04/01/2024    HGBA1C 6.7 (H) 12/01/2023    HGBA1C 7.1 (H) 07/31/2023     Lab Results   Component Value Date    CPEPTIDE 2.86 07/31/2023      No results found for: "FRUCTOSAMINE"  Lab Results   Component Value Date    MICALBCREAT 4.5 09/03/2024     No results found for: "[id number]"    Diabetes Management Status: Reviewed this office visit  Screening or Prevention Patient's value Goal Complete/Controlled?   Lipid profile : 05/22/2024 Annually Yes     Dilated retinal exam : 10/11/2022 Annually Yes     Foot exam   : 05/21/2024 Annually Yes        Reviewed past surgical, medical, family, social history and updated as appropriate.  Review of Systems: see HPI above  Objective:   /86   Pulse 66   Wt 104 kg (229 lb 3.2 oz)   BMI 36.99 kg/m²   Body mass index is 36.99 kg/m².  Vital signs reviewed    Physical Exam  Vitals and nursing note reviewed.   Constitutional:       Appearance: Normal appearance. She is well-developed. She is obese. She is not ill-appearing.   Neck:      Thyroid: No thyromegaly.   Pulmonary:      Effort: Pulmonary effort is normal. No respiratory distress.   Musculoskeletal:         General: Normal range of motion.      Cervical back: Normal range of motion.   Neurological:      General: No focal deficit present.      Mental Status: She is alert. Mental status is at baseline.   Psychiatric:         Mood and Affect: Mood normal.         Behavior: Behavior normal.     Lab Reviewed:  See results in subjective  Lab Results   Component Value Date    HGBA1C 6.0 (H) 09/03/2024     Lab Results "   Component Value Date    CHOL 133 05/22/2024    HDL 47 05/22/2024    LDLCALC 62.6 (L) 05/22/2024    TRIG 117 05/22/2024    CHOLHDL 35.3 05/22/2024     Lab Results   Component Value Date     09/03/2024    K 3.8 09/03/2024     09/03/2024    CO2 26 09/03/2024     09/03/2024    BUN 15 09/03/2024    CREATININE 1.1 09/03/2024    CALCIUM 9.8 09/03/2024    PHOS 3.9 04/01/2024    PROT 6.7 05/22/2024    ALBUMIN 3.5 05/22/2024    BILITOT 0.6 05/22/2024    ALKPHOS 102 05/22/2024    AST 20 05/22/2024    ALT 19 05/22/2024    ANIONGAP 11 09/03/2024    EGFRNORACEVR 53.7 (A) 09/03/2024    TSH 2.367 05/22/2024     Assessment     1. Controlled type 2 diabetes mellitus without complication, without long-term current use of insulin        2. Type 2 diabetes mellitus with hyperglycemia, without long-term current use of insulin  glipizide-metformin (METAGLIP) 5-500 mg per tablet    Basic Metabolic Panel    Hemoglobin A1C      3. Class 2 severe obesity due to excess calories with serious comorbidity and body mass index (BMI) of 35.0 to 35.9 in adult        4. Diabetes mellitus type 2 in obese        5. Hypercholesterolemia          Plan     Controlled type 2 diabetes mellitus without complication, without long-term current use of insulin  - Diabetes is at goal, given most current A1C, and recent hospital admission for hyperglycemia  - Goal A1C for patient is 7%  - diabetes is complicated by dietary indiscretion, poor insight to diabetes> doing better  - Diabetic supplies/medications were reviewed this visit to ensure continue steady supplies  - Advised patient to get routine feet care, routine eye exam, plus routine maintenance screening  - Diabetes goal including glucose glucose and A1C goals discussed  - weight loss noted    Plan  - Adjustment made: Ozempic 1.0 mg once a week injection, Pharmacy Assistance to help with cost of Ozempic  - Continue glipizide/metformin combination pill, 5/500 mg 1 tablet twice a day,  monitor for hypoglycemia  - Encouragement of dietary modification, portion size control, decreasing carbohydrates intake  - Encouragement of dietary changes and weight loss  - Advised pt to check glucoses regularly, asked to filled glucose log and bring back for review at next office visit  - check lab work in 4 months re-evaluate  - Clear written instruction given on AVS. Follow up as scheduled    Class 2 severe obesity due to excess calories with serious comorbidity and body mass index (BMI) of 35.0 to 35.9 in adult  - Body mass index is 36.99 kg/m².  - dietary discussion as above  - continue to monitor weight  - continue Ozempic    Diabetes mellitus type 2 in obese  - Body mass index is 36.99 kg/m².  - dietary discussion as above  - encourage to continue weight loss    Hypercholesterolemia  - lipid panel review today  - ASCVD Risk below: Statin: Taking  The 10-year ASCVD risk score (Tea LAU, et al., 2019) is: 21.6%    Values used to calculate the score:      Age: 69 years      Sex: Female      Is Non- : No      Diabetic: Yes      Tobacco smoker: No      Systolic Blood Pressure: 122 mmHg      Is BP treated: Yes      HDL Cholesterol: 47 mg/dL      Total Cholesterol: 133 mg/dL    Advised patient to follow up with PCP for routine health maintenance care.   RTC in 5-6months    Anmol Baugh M.D.  Endocrinology  [facility]  9/9/2024      Disclaimer: This note has been generated in part with the use of voice-recognition software. There may be typographical errors that have been missed during proof-reading.

## 2024-09-10 ENCOUNTER — PROCEDURE VISIT (OUTPATIENT)
Dept: DERMATOLOGY | Facility: CLINIC | Age: 69
End: 2024-09-10
Payer: MEDICARE

## 2024-09-10 VITALS
BODY MASS INDEX: 36.84 KG/M2 | HEIGHT: 66 IN | SYSTOLIC BLOOD PRESSURE: 133 MMHG | WEIGHT: 229.25 LBS | DIASTOLIC BLOOD PRESSURE: 75 MMHG | HEART RATE: 66 BPM

## 2024-09-10 DIAGNOSIS — C44.319 BASAL CELL CARCINOMA OF RIGHT CHEEK: Primary | ICD-10-CM

## 2024-09-10 PROCEDURE — 13132 CMPLX RPR F/C/C/M/N/AX/G/H/F: CPT | Mod: HCNC,S$GLB,, | Performed by: DERMATOLOGY

## 2024-09-10 PROCEDURE — 99499 UNLISTED E&M SERVICE: CPT | Mod: HCNC,S$GLB,, | Performed by: DERMATOLOGY

## 2024-09-10 PROCEDURE — 17311 MOHS 1 STAGE H/N/HF/G: CPT | Mod: HCNC,51,S$GLB, | Performed by: DERMATOLOGY

## 2024-09-10 NOTE — PROGRESS NOTES
PROCEDURE: Mohs' Micrographic Surgery    INDICATION: Location in non-mask areas of face where maximum conservation of tumor-free tissue is needed. Biopsy-proven skin cancer of cosmetically and functionally important areas, including head, neck, genital, hand, foot, or areas known for having difficulty in healing, such as the lower anterior legs. Tumor with ill-defined borders.    REFERRING PROVIDER: Bushra Moy M.D.    CASE NUMBER:     ANESTHETIC: 3 cc 0.5% Lidocaine with Epi 1:200,000 mixed 1:1 with 0.5% Bupivacaine    SURGICAL PREP: Hibiclens    SURGEON: Dora Macdonald MD    ASSISTANTS: Kathy Thompson MA    PREOPERATIVE DIAGNOSIS: basal cell carcinoma- superficial, nodular    POSTOPERATIVE DIAGNOSIS: basal cell carcinoma- superficial, nodular    PATHOLOGIC DIAGNOSIS: basal cell carcinoma- superficial, nodular    HISTOLOGY OF SPECIMENS IN FIRST STAGE:   Debulking tumor confirms superficial and nodular basal cell carcinoma.  Tumor Type:  No tumor seen.    STAGES OF MOHS' SURGERY PERFORMED: 1    TUMOR-FREE PLANE ACHIEVED: Yes    HEMOSTASIS: electrocoagulation     SPECIMENS: 2     LOCATION: right cheek bone. Location verified with Dr. Moy's clinical photograph. Patient also verified location with hand held mirror.    INITIAL LESION SIZE: 0.7 x 0.8 cm    FINAL DEFECT SIZE: 1.0 x 1.2 cm    WOUND REPAIR/DISPOSITION: The patient tolerated Mohs' Micrographic Surgery for a basal cell carcinoma very well. When the tumor was completely removed, a repair of the surgical defect was undertaken.    PROCEDURE: Complex Linear Repair    INDICATION: Status post Mohs' Micrographic Surgery for basal cell carcinoma.    CASE NUMBER:     SURGEON: Dora Macdonald MD    ASSISTANTS: Nelda Jacome Surg Stephanie    ANESTHETIC: 3 cc 1% Lidocaine with Epinephrine 1:100,000    SURGICAL PREP: Hibiclens, prepped by Nelda Jacome Surg Stephanie    LOCATION: right cheek bone    DEFECT SIZE: 1.0 x 1.2 cm    WOUND  "REPAIR/DISPOSITION:  After the patient's carcinoma had been completely removed with Mohs' Micrographic Surgery, a repair of the surgical defect was undertaken. The patient was returned to the operating suite where the area of right cheek bone was prepped, draped, and anesthetized in the usual sterile fashion. The wound was widely undermined in all directions. The wound was undermined to a distance at least the maximum width of the defect as measured perpendicular to the closure line along at least one entire edge of the defect, in this case 1.5 cm. Then, electrocoagulation was used to obtain meticulous hemostasis. 5-0 Vicryl buried vertical mattress sutures were placed into the subcutaneous and dermal plane to close the wound and isis the cutaneous wound edge. Bilateral dog ears were identified and were removed by a standard Burow's triangle technique. The cutaneous wound edges were closed using running 5-0 Prolene suture.    The patient tolerated the procedure well.    The area was cleaned and dressed appropriately and the patient was given wound care instructions, as well as appointment for follow-up evaluation and suture removal in 7 days.    LENGTH OF REPAIR: 4 cm    Vitals:    09/10/24 1215 09/10/24 1413   BP: 132/73 133/75   BP Location: Left arm Right forearm   Patient Position: Sitting Sitting   BP Method: Large (Automatic) Large (Automatic)   Pulse: 75 66   Weight: 104 kg (229 lb 4.5 oz)    Height: 5' 6" (1.676 m)          "

## 2024-09-18 ENCOUNTER — PROCEDURE VISIT (OUTPATIENT)
Dept: DERMATOLOGY | Facility: CLINIC | Age: 69
End: 2024-09-18
Payer: MEDICARE

## 2024-09-18 VITALS
BODY MASS INDEX: 36.84 KG/M2 | HEIGHT: 66 IN | HEART RATE: 55 BPM | DIASTOLIC BLOOD PRESSURE: 76 MMHG | SYSTOLIC BLOOD PRESSURE: 130 MMHG | WEIGHT: 229.25 LBS

## 2024-09-18 DIAGNOSIS — C44.319 BASAL CELL CARCINOMA OF LEFT FOREHEAD: Primary | ICD-10-CM

## 2024-09-18 PROCEDURE — 13131 CMPLX RPR F/C/C/M/N/AX/G/H/F: CPT | Mod: HCNC,79,S$GLB, | Performed by: DERMATOLOGY

## 2024-09-18 PROCEDURE — 17311 MOHS 1 STAGE H/N/HF/G: CPT | Mod: 51,79,HCNC,S$GLB | Performed by: DERMATOLOGY

## 2024-09-18 PROCEDURE — 99499 UNLISTED E&M SERVICE: CPT | Mod: HCNC,S$GLB,, | Performed by: DERMATOLOGY

## 2024-09-18 NOTE — PROGRESS NOTES
69 y.o. female patient is here for suture removal following Mohs' surgery.    Patient reports no problems right cheek.    WOUND PE:  The right cheek sutures intact. Wound healing well. Good skin edges. No signs or symptoms of infection.      IMPRESSION:  Healing operative site from Mohs' surgery. BCC, right cheek s/p Mohs with CLC, postop day #7.    PLAN:  Sutures removed today by Nelda Jacome Surg Tech. Steri-strips applied.  Continue wound care.  Keep moist with Aquaphor.    RTC:  1 week s/r left forehead

## 2024-09-18 NOTE — PROGRESS NOTES
PROCEDURE: Mohs' Micrographic Surgery    INDICATION: Location in non-mask areas of face where maximum conservation of tumor-free tissue is needed. Biopsy-proven skin cancer of cosmetically and functionally important areas, including head, neck, genital, hand, foot, or areas known for having difficulty in healing, such as the lower anterior legs. Tumor with ill-defined borders.    REFERRING PROVIDER: Bushra Moy M.D.    CASE NUMBER:     ANESTHETIC: 2 cc 0.5% Lidocaine with Epi 1:200,000 mixed 1:1 with 0.5% Bupivacaine    SURGICAL PREP: Hibiclens    SURGEON: Dora Macdonald MD    ASSISTANTS: Nelda Jacome Surg Stephanie    PREOPERATIVE DIAGNOSIS: basal cell carcinoma- superficial, nodular    POSTOPERATIVE DIAGNOSIS: basal cell carcinoma    PATHOLOGIC DIAGNOSIS: basal cell carcinoma- superficial, nodular    HISTOLOGY OF SPECIMENS IN FIRST STAGE:   Tumor Type:  No tumor seen.    STAGES OF MOHS' SURGERY PERFORMED: 1    TUMOR-FREE PLANE ACHIEVED: Yes    HEMOSTASIS: electrocoagulation     SPECIMENS: 2     LOCATION: left upper forehead. Location verified with Dr. Moy's clinical photograph. Patient also verified location with hand held mirror.    INITIAL LESION SIZE: 0.4 x 0.4 cm    FINAL DEFECT SIZE: 0.7 x 0.7 cm    WOUND REPAIR/DISPOSITION: The patient tolerated Mohs' Micrographic Surgery for a basal cell carcinoma very well. When the tumor was completely removed, a repair of the surgical defect was undertaken.    PROCEDURE: Complex Linear Repair    INDICATION: Status post Mohs' Micrographic Surgery for basal cell carcinoma.    CASE NUMBER:     SURGEON: Dora Macdonald MD    ASSISTANTS: Maris Zimmer PA-C and Nelda Jacome Surg Stephanie    ANESTHETIC: 2 cc 1% Lidocaine with Epinephrine 1:100,000    SURGICAL PREP: Hibiclens, prepped by Nelda Jacome Surg Tech    LOCATION: left upper forehead    DEFECT SIZE: 0.7 x 0.7 cm    WOUND REPAIR/DISPOSITION:  After the patient's carcinoma had been completely removed  "with Mohs' Micrographic Surgery, a repair of the surgical defect was undertaken. The patient was returned to the operating suite where the area of left upper forehead was prepped, draped, and anesthetized in the usual sterile fashion. The wound was widely undermined in all directions. The wound was undermined to a distance at least the maximum width of the defect as measured perpendicular to the closure line along at least one entire edge of the defect, in this case 1.5 cm. Then, electrocoagulation was used to obtain meticulous hemostasis. 4-0 Vicryl buried vertical mattress sutures were placed into the subcutaneous and dermal plane to close the wound and isis the cutaneous wound edge. Bilateral dog ears were identified and were removed by a standard Burow's triangle technique. The cutaneous wound edges were closed using interrupted 5-0 Prolene suture.    The patient tolerated the procedure well.    The area was cleaned and dressed appropriately and the patient was given wound care instructions, as well as appointment for follow-up evaluation and suture removal in 7 days.    LENGTH OF REPAIR: 2 cm    Vitals:    09/18/24 0713 09/18/24 0857   BP: (!) 170/81 130/76   BP Location: Left arm    Patient Position: Sitting    BP Method: Large (Automatic)    Pulse: 62 (!) 55   Weight: 104 kg (229 lb 4.5 oz)    Height: 5' 6" (1.676 m)          "

## 2024-09-25 ENCOUNTER — OFFICE VISIT (OUTPATIENT)
Dept: DERMATOLOGY | Facility: CLINIC | Age: 69
End: 2024-09-25
Payer: MEDICARE

## 2024-09-25 DIAGNOSIS — Z09 POSTOP CHECK: Primary | ICD-10-CM

## 2024-09-25 PROCEDURE — 3061F NEG MICROALBUMINURIA REV: CPT | Mod: HCNC,CPTII,S$GLB, | Performed by: DERMATOLOGY

## 2024-09-25 PROCEDURE — 3066F NEPHROPATHY DOC TX: CPT | Mod: HCNC,CPTII,S$GLB, | Performed by: DERMATOLOGY

## 2024-09-25 PROCEDURE — 99999 PR PBB SHADOW E&M-EST. PATIENT-LVL III: CPT | Mod: PBBFAC,HCNC,, | Performed by: DERMATOLOGY

## 2024-09-25 PROCEDURE — 3044F HG A1C LEVEL LT 7.0%: CPT | Mod: HCNC,CPTII,S$GLB, | Performed by: DERMATOLOGY

## 2024-09-25 PROCEDURE — 3288F FALL RISK ASSESSMENT DOCD: CPT | Mod: HCNC,CPTII,S$GLB, | Performed by: DERMATOLOGY

## 2024-09-25 PROCEDURE — 1126F AMNT PAIN NOTED NONE PRSNT: CPT | Mod: HCNC,CPTII,S$GLB, | Performed by: DERMATOLOGY

## 2024-09-25 PROCEDURE — 1101F PT FALLS ASSESS-DOCD LE1/YR: CPT | Mod: HCNC,CPTII,S$GLB, | Performed by: DERMATOLOGY

## 2024-09-25 PROCEDURE — 4010F ACE/ARB THERAPY RXD/TAKEN: CPT | Mod: HCNC,CPTII,S$GLB, | Performed by: DERMATOLOGY

## 2024-09-25 PROCEDURE — 99024 POSTOP FOLLOW-UP VISIT: CPT | Mod: HCNC,S$GLB,, | Performed by: DERMATOLOGY

## 2024-09-25 PROCEDURE — 1159F MED LIST DOCD IN RCRD: CPT | Mod: HCNC,CPTII,S$GLB, | Performed by: DERMATOLOGY

## 2024-09-25 NOTE — PROGRESS NOTES
69 y.o. female patient is here for suture removal following Mohs' surgery.    Patient reports no problems left upper forehead.    WOUND PE:  The left upper forehead sutures intact. Wound healing well. Good skin edges. No signs or symptoms of infection.    IMPRESSION:  Healing operative site from Mohs' surgery. BCC, left upper forehead s/p Mohs with CLC, postop day #7.    PLAN:  Sutures removed today by Jeny Blackmon MA. Steri-strips applied.  Continue wound care.  Keep moist with Aquaphor.  Call if any issues arise    RTC:  In 3-6 months with Dr. Bushra Moy for skin check or sooner if new concern arises.

## 2024-10-13 DIAGNOSIS — E66.9 DIABETES MELLITUS TYPE 2 IN OBESE: ICD-10-CM

## 2024-10-13 DIAGNOSIS — E11.69 DIABETES MELLITUS TYPE 2 IN OBESE: ICD-10-CM

## 2024-10-14 ENCOUNTER — PATIENT MESSAGE (OUTPATIENT)
Dept: INTERNAL MEDICINE | Facility: CLINIC | Age: 69
End: 2024-10-14
Payer: MEDICARE

## 2024-10-14 RX ORDER — CALCIUM CITRATE/VITAMIN D3 200MG-6.25
TABLET ORAL
Qty: 400 STRIP | Refills: 3 | Status: SHIPPED | OUTPATIENT
Start: 2024-10-14

## 2024-10-16 ENCOUNTER — PATIENT OUTREACH (OUTPATIENT)
Dept: ADMINISTRATIVE | Facility: HOSPITAL | Age: 69
End: 2024-10-16
Payer: MEDICARE

## 2024-10-16 NOTE — LETTER
AUTHORIZATION FOR RELEASE OF   CONFIDENTIAL INFORMATION    Dear [facility],    We are seeing Maria Alejandra De La Rosa, date of birth 1955, in the clinic at [facility] INTERNAL MEDIC[facility]. Caryn Ambrosio MD is the patient's PCP. Maria Alejandra De La Rosa has an outstanding lab/procedure at the time we reviewed her chart. In order to help keep her health information updated, she has authorized us to request the following medical record(s):        (  )  MAMMOGRAM                                      (  )  COLONOSCOPY      (  )  PAP SMEAR                                          (  )  OUTSIDE LAB RESULTS     (  )  DEXA SCAN                                          ( X )  EYE EXAM            (  )  FOOT EXAM                                          (  )  ENTIRE RECORD     (  )  OUTSIDE IMMUNIZATIONS                 (  )  _______________         Please fax records to Caryn Ambrosio MD, [phone]     If you have any questions, please contact GAGE Prasad at [phone].           Patient Name: Maria Alejandra De La Rosa  : 1955  Patient Phone #: [phone]

## 2024-10-16 NOTE — PROGRESS NOTES
Health Maintenance Due   Topic Date Due    RSV Vaccine (Age 60+ and Pregnant patients) (1 - Risk 60-74 years 1-dose series) Never done    Eye Exam  10/11/2023    Influenza Vaccine (1) 09/01/2024     Triggered LINKS and reconciled immunizations. Updated Care Everywhere. Record request sent to [facility] asking for a copy of pt's most recent eye exam results. Chart review completed.

## 2024-11-15 ENCOUNTER — OFFICE VISIT (OUTPATIENT)
Dept: INTERNAL MEDICINE | Facility: CLINIC | Age: 69
End: 2024-11-15
Payer: MEDICARE

## 2024-11-15 VITALS
DIASTOLIC BLOOD PRESSURE: 80 MMHG | BODY MASS INDEX: 36.53 KG/M2 | SYSTOLIC BLOOD PRESSURE: 132 MMHG | HEIGHT: 66 IN | HEART RATE: 70 BPM | OXYGEN SATURATION: 98 % | WEIGHT: 227.31 LBS

## 2024-11-15 DIAGNOSIS — J02.9 PHARYNGITIS, UNSPECIFIED ETIOLOGY: Primary | ICD-10-CM

## 2024-11-15 DIAGNOSIS — B34.9 VIRAL SYNDROME: ICD-10-CM

## 2024-11-15 LAB
CTP QC/QA: YES
CTP QC/QA: YES
POC MOLECULAR INFLUENZA A AGN: NEGATIVE
POC MOLECULAR INFLUENZA B AGN: NEGATIVE
SARS-COV-2 RDRP RESP QL NAA+PROBE: NEGATIVE

## 2024-11-15 PROCEDURE — 3288F FALL RISK ASSESSMENT DOCD: CPT | Mod: HCNC,CPTII,S$GLB, | Performed by: PHYSICIAN ASSISTANT

## 2024-11-15 PROCEDURE — 4010F ACE/ARB THERAPY RXD/TAKEN: CPT | Mod: HCNC,CPTII,S$GLB, | Performed by: PHYSICIAN ASSISTANT

## 2024-11-15 PROCEDURE — 1101F PT FALLS ASSESS-DOCD LE1/YR: CPT | Mod: HCNC,CPTII,S$GLB, | Performed by: PHYSICIAN ASSISTANT

## 2024-11-15 PROCEDURE — 87635 SARS-COV-2 COVID-19 AMP PRB: CPT | Mod: QW,HCNC,S$GLB, | Performed by: PHYSICIAN ASSISTANT

## 2024-11-15 PROCEDURE — 3061F NEG MICROALBUMINURIA REV: CPT | Mod: HCNC,CPTII,S$GLB, | Performed by: PHYSICIAN ASSISTANT

## 2024-11-15 PROCEDURE — 3066F NEPHROPATHY DOC TX: CPT | Mod: HCNC,CPTII,S$GLB, | Performed by: PHYSICIAN ASSISTANT

## 2024-11-15 PROCEDURE — 3044F HG A1C LEVEL LT 7.0%: CPT | Mod: HCNC,CPTII,S$GLB, | Performed by: PHYSICIAN ASSISTANT

## 2024-11-15 PROCEDURE — 3075F SYST BP GE 130 - 139MM HG: CPT | Mod: HCNC,CPTII,S$GLB, | Performed by: PHYSICIAN ASSISTANT

## 2024-11-15 PROCEDURE — 1125F AMNT PAIN NOTED PAIN PRSNT: CPT | Mod: HCNC,CPTII,S$GLB, | Performed by: PHYSICIAN ASSISTANT

## 2024-11-15 PROCEDURE — 87502 INFLUENZA DNA AMP PROBE: CPT | Mod: QW,HCNC,S$GLB, | Performed by: PHYSICIAN ASSISTANT

## 2024-11-15 PROCEDURE — 99214 OFFICE O/P EST MOD 30 MIN: CPT | Mod: HCNC,S$GLB,, | Performed by: PHYSICIAN ASSISTANT

## 2024-11-15 PROCEDURE — 99999 PR PBB SHADOW E&M-EST. PATIENT-LVL IV: CPT | Mod: PBBFAC,HCNC,, | Performed by: PHYSICIAN ASSISTANT

## 2024-11-15 PROCEDURE — 1159F MED LIST DOCD IN RCRD: CPT | Mod: HCNC,CPTII,S$GLB, | Performed by: PHYSICIAN ASSISTANT

## 2024-11-15 PROCEDURE — 3008F BODY MASS INDEX DOCD: CPT | Mod: HCNC,CPTII,S$GLB, | Performed by: PHYSICIAN ASSISTANT

## 2024-11-15 PROCEDURE — 3079F DIAST BP 80-89 MM HG: CPT | Mod: HCNC,CPTII,S$GLB, | Performed by: PHYSICIAN ASSISTANT

## 2024-11-15 RX ORDER — AZELASTINE 1 MG/ML
1 SPRAY, METERED NASAL 2 TIMES DAILY
Qty: 30 ML | Refills: 0 | Status: SHIPPED | OUTPATIENT
Start: 2024-11-15 | End: 2025-11-15

## 2024-11-15 RX ORDER — BENZONATATE 100 MG/1
100 CAPSULE ORAL 3 TIMES DAILY PRN
Qty: 30 CAPSULE | Refills: 0 | Status: SHIPPED | OUTPATIENT
Start: 2024-11-15 | End: 2024-11-25

## 2024-11-15 RX ORDER — ONDANSETRON 4 MG/1
4 TABLET, ORALLY DISINTEGRATING ORAL DAILY PRN
Qty: 15 TABLET | Refills: 0 | Status: SHIPPED | OUTPATIENT
Start: 2024-11-15

## 2024-11-15 NOTE — PROGRESS NOTES
INTERNAL MEDICINE URGENT VISIT NOTE    CHIEF COMPLAINT     Chief Complaint   Patient presents with    Sore Throat    URI       HPI     Maria Alejandra De La Rosa is a 69 y.o. female who presents for an urgent visit today.    PCP is Caryn Ambrosio MD, patient is new to me.     Patient presents with complaints of:   Feeling feverish  Vomiting   Diarrhea  Sore throat   Nasal congestion   Coughing   Nyquil and lozenges   Symptoms have been present for about 4 days PTA.    Positive sick contact, her grandson has similar symptoms.    No recent travel     BS has been good at 77 this AM   No bleeding   No chest pain or SOB   Pt has not been taking any medications to help with symptoms.   She is unaccompanied in the office.     Past Medical History:  Past Medical History:   Diagnosis Date    Basal cell carcinoma     Cataract     Depression     Diabetes mellitus type 2 in obese 02/01/2018    Diabetes mellitus, type 2     DJD (degenerative joint disease)     Dysmetabolic syndrome     Hyperglycemia     Hypertension     Lumbar stenosis     Sleep apnea        Home Medications:  Prior to Admission medications    Medication Sig Start Date End Date Taking? Authorizing Provider   alcohol swabs (BD ALCOHOL SWABS) PadM Apply 1 each topically as needed (blood sugar testing). 5/10/23  Yes Jeannie Rogers PA-C   atorvastatin (LIPITOR) 10 MG tablet TAKE 1 TABLET EVERY DAY 4/25/24  Yes Caryn Ambrosio MD   carvediloL (COREG) 12.5 MG tablet TAKE 1 TABLET TWICE DAILY WITH MEALS 4/12/24  Yes Caryn Ambrosio MD   diclofenac sodium (VOLTAREN) 1 % Gel Apply 2 g topically 4 (four) times daily. 11/14/23  Yes Carrington Lao MD   glipizide-metformin (METAGLIP) 5-500 mg per tablet Take 1 tablet by mouth 2 (two) times daily before meals. 9/9/24 9/9/25 Yes Anmol Baugh MD   hydroCHLOROthiazide (HYDRODIURIL) 25 MG tablet TAKE 1 TABLET ONE TIME DAILY 4/29/24  Yes Caryn Ambrosio MD   losartan (COZAAR) 100 MG  tablet TAKE 1 TABLET EVERY DAY 2/12/24  Yes Caryn Ambrosio MD   multivitamin capsule Take 1 capsule by mouth once daily.   Yes Provider, Historical   ondansetron (ZOFRAN) 4 MG tablet Take 1 tablet (4 mg total) by mouth every 6 (six) hours. 1/3/24  Yes Larry Jackson MD   ONETOUCH DELICA LANCETS 33 gauge Misc Inject 1 lancet into the skin 2 (two) times daily. 4/12/18  Yes Leida Valerio MD   semaglutide (OZEMPIC) 1 mg/dose (4 mg/3 mL) Inject 1 mg into the skin every 7 days. 3/7/24  Yes Anmol Baugh MD   sertraline (ZOLOFT) 25 MG tablet TAKE 1 TABLET ONE TIME DAILY 9/5/24  Yes Caryn Ambrosio MD   TRUE METRIX GLUCOSE TEST STRIP Strp USE 4 TIMES DAILY 10/14/24  Yes Anmol Baugh MD   vitamin D 1000 units Tab Take 1,000 Units by mouth once daily.   Yes Provider, Historical       Review of Systems:  Review of Systems   Constitutional:  Negative for chills and fever.   HENT:  Positive for congestion and sore throat. Negative for trouble swallowing.    Eyes:  Negative for visual disturbance.   Respiratory:  Positive for cough. Negative for shortness of breath.    Cardiovascular:  Negative for chest pain.   Gastrointestinal:  Positive for nausea and vomiting. Negative for abdominal pain, constipation and diarrhea.   Genitourinary:  Negative for dysuria and flank pain.   Musculoskeletal:  Negative for back pain, neck pain and neck stiffness.   Skin:  Negative for rash.   Neurological:  Negative for dizziness, syncope, weakness and headaches.   Psychiatric/Behavioral:  Negative for confusion.        Health Maintainence:   Immunizations:  Health Maintenance         Date Due Completion Date    COVID-19 Vaccine (1) Never done ---    TETANUS VACCINE Never done ---    Shingles Vaccine (1 of 2) Never done ---    RSV Vaccine (Age 60+ and Pregnant patients) (1 - Risk 60-74 years 1-dose series) Never done ---    Eye Exam 10/11/2023 10/11/2022    Override on 3/28/2017: Done    Influenza Vaccine (1)  "09/01/2024 1/30/2019    Override on 11/28/2017: Declined    Hemoglobin A1c 03/03/2025 9/3/2024    Override on 11/7/2017: Done ([facility] 7.6 [facility])    Foot Exam 05/21/2025 5/21/2024 (Done)    Override on 5/21/2024: Done    Override on 1/19/2023: Done    Override on 1/30/2019: Done    Lipid Panel 05/22/2025 5/22/2024    DEXA Scan 05/25/2025 5/25/2022    Override on 11/16/2018: Done    Mammogram 06/24/2025 6/24/2024    Override on 11/16/2018: Done (mammogram ej)    Override on 8/14/2017: Done (WNL - repeat 1 yr - @ [facility] (Dr. Guerrier) - [place])    Diabetes Urine Screening 09/03/2025 9/3/2024    High Dose Statin 09/25/2025 9/25/2024    Colorectal Cancer Screening 06/03/2027 6/3/2024             PHYSICAL EXAM     /80 (BP Location: Left arm, Patient Position: Sitting)   Pulse 70   Ht 5' 6" (1.676 m)   Wt 103.1 kg (227 lb 4.7 oz)   SpO2 98%   BMI 36.69 kg/m²     Physical Exam  Vitals and nursing note reviewed.   Constitutional:       Appearance: Normal appearance.      Comments: Elderly female in NAD or apparent pain. She makes good eye contact, speaks in clear full sentences and ambulates with ease.      HENT:      Head: Normocephalic and atraumatic.      Nose: Nose normal.      Mouth/Throat:      Pharynx: Oropharynx is clear.   Eyes:      Conjunctiva/sclera: Conjunctivae normal.   Cardiovascular:      Rate and Rhythm: Normal rate and regular rhythm.      Pulses: Normal pulses.   Pulmonary:      Effort: No respiratory distress.      Breath sounds: No stridor. No rhonchi.      Comments: Lungs are CTA b/l   Chest:      Chest wall: No tenderness.   Abdominal:      Tenderness: There is no abdominal tenderness.      Comments: Benign abdomen    Musculoskeletal:         General: Normal range of motion.      Cervical back: No rigidity.   Skin:     General: Skin is warm and dry.      Capillary Refill: Capillary refill takes less than 2 seconds.      Findings: No rash.   Neurological:      General: " No focal deficit present.      Mental Status: She is alert.      Gait: Gait normal.   Psychiatric:         Mood and Affect: Mood normal.         LABS     Lab Results   Component Value Date    HGBA1C 6.0 (H) 09/03/2024     CMP  Sodium   Date Value Ref Range Status   09/03/2024 140 136 - 145 mmol/L Final     Potassium   Date Value Ref Range Status   09/03/2024 3.8 3.5 - 5.1 mmol/L Final     Chloride   Date Value Ref Range Status   09/03/2024 103 95 - 110 mmol/L Final     CO2   Date Value Ref Range Status   09/03/2024 26 23 - 29 mmol/L Final     Glucose   Date Value Ref Range Status   09/03/2024 102 70 - 110 mg/dL Final     BUN   Date Value Ref Range Status   09/03/2024 15 8 - 23 mg/dL Final     Creatinine   Date Value Ref Range Status   09/03/2024 1.1 0.5 - 1.4 mg/dL Final     Calcium   Date Value Ref Range Status   09/03/2024 9.8 8.7 - 10.5 mg/dL Final     Total Protein   Date Value Ref Range Status   05/22/2024 6.7 6.0 - 8.4 g/dL Final     Albumin   Date Value Ref Range Status   05/22/2024 3.5 3.5 - 5.2 g/dL Final     Total Bilirubin   Date Value Ref Range Status   05/22/2024 0.6 0.1 - 1.0 mg/dL Final     Comment:     For infants and newborns, interpretation of results should be based  on gestational age, weight and in agreement with clinical  observations.    Premature Infant recommended reference ranges:  Up to 24 hours.............<8.0 mg/dL  Up to 48 hours............<12.0 mg/dL  3-5 days..................<15.0 mg/dL  6-29 days.................<15.0 mg/dL       Alkaline Phosphatase   Date Value Ref Range Status   05/22/2024 102 55 - 135 U/L Final     AST   Date Value Ref Range Status   05/22/2024 20 10 - 40 U/L Final     ALT   Date Value Ref Range Status   05/22/2024 19 10 - 44 U/L Final     Anion Gap   Date Value Ref Range Status   09/03/2024 11 8 - 16 mmol/L Final     eGFR if    Date Value Ref Range Status   05/24/2022 >60.0 >60 mL/min/1.73 m^2 Final     eGFR if non    Date  Value Ref Range Status   05/24/2022 >60.0 >60 mL/min/1.73 m^2 Final     Comment:     Calculation used to obtain the estimated glomerular filtration  rate (eGFR) is the CKD-EPI equation.        Lab Results   Component Value Date    WBC 6.16 05/22/2024    HGB 12.7 05/22/2024    HCT 38.7 05/22/2024    MCV 91 05/22/2024     05/22/2024     Lab Results   Component Value Date    CHOL 133 05/22/2024    CHOL 99 (L) 05/24/2022    CHOL 122 01/31/2019     Lab Results   Component Value Date    HDL 47 05/22/2024    HDL 49 05/24/2022    HDL 46 01/31/2019     Lab Results   Component Value Date    LDLCALC 62.6 (L) 05/22/2024    LDLCALC 39.0 (L) 05/24/2022    LDLCALC 55.6 (L) 01/31/2019     Lab Results   Component Value Date    TRIG 117 05/22/2024    TRIG 55 05/24/2022    TRIG 102 01/31/2019     Lab Results   Component Value Date    CHOLHDL 35.3 05/22/2024    CHOLHDL 49.5 05/24/2022    CHOLHDL 37.7 01/31/2019     Lab Results   Component Value Date    TSH 2.367 05/22/2024       ASSESSMENT/PLAN     Maria Alejandra De La Rosa is a 69 y.o. female     Maria Alejandra was seen today for sore throat and uri. Likely viral etiology. Recommend symptom mgmt. RTC if symptoms change or worsen. She is aware of ED prompts.     Diagnoses and all orders for this visit:    Pharyngitis, unspecified etiology  -     POCT COVID-19 Rapid Screening  -     POCT Influenza A/B Molecular    Viral syndrome  -     POCT COVID-19 Rapid Screening  -     POCT Influenza A/B Molecular    Other orders  -     azelastine (ASTELIN) 137 mcg (0.1 %) nasal spray; 1 spray (137 mcg total) by Nasal route 2 (two) times daily.  -     benzonatate (TESSALON) 100 MG capsule; Take 1 capsule (100 mg total) by mouth 3 (three) times daily as needed for Cough.  -     ondansetron (ZOFRAN-ODT) 4 MG TbDL; Take 1 tablet (4 mg total) by mouth daily as needed (nausea).     Patient was counseled on when and how to seek emergent care.       Alessia Moon PA-C   Department of Internal Medicine - [facility]   8:41 AM

## 2024-11-22 ENCOUNTER — OFFICE VISIT (OUTPATIENT)
Dept: INTERNAL MEDICINE | Facility: CLINIC | Age: 69
End: 2024-11-22
Payer: MEDICARE

## 2024-11-22 ENCOUNTER — LAB VISIT (OUTPATIENT)
Dept: LAB | Facility: HOSPITAL | Age: 69
End: 2024-11-22
Attending: INTERNAL MEDICINE
Payer: MEDICARE

## 2024-11-22 VITALS
SYSTOLIC BLOOD PRESSURE: 130 MMHG | HEIGHT: 66 IN | HEART RATE: 63 BPM | WEIGHT: 226.19 LBS | DIASTOLIC BLOOD PRESSURE: 60 MMHG | BODY MASS INDEX: 36.35 KG/M2 | OXYGEN SATURATION: 99 %

## 2024-11-22 DIAGNOSIS — E78.5 HYPERLIPIDEMIA ASSOCIATED WITH TYPE 2 DIABETES MELLITUS: Primary | ICD-10-CM

## 2024-11-22 DIAGNOSIS — F43.23 ADJUSTMENT REACTION WITH ANXIETY AND DEPRESSION: ICD-10-CM

## 2024-11-22 DIAGNOSIS — I15.2 HYPERTENSION ASSOCIATED WITH DIABETES: ICD-10-CM

## 2024-11-22 DIAGNOSIS — E11.69 HYPERLIPIDEMIA ASSOCIATED WITH TYPE 2 DIABETES MELLITUS: Primary | ICD-10-CM

## 2024-11-22 DIAGNOSIS — E78.5 HYPERLIPIDEMIA, UNSPECIFIED HYPERLIPIDEMIA TYPE: ICD-10-CM

## 2024-11-22 DIAGNOSIS — E11.9 CONTROLLED TYPE 2 DIABETES MELLITUS WITHOUT COMPLICATION, WITHOUT LONG-TERM CURRENT USE OF INSULIN: ICD-10-CM

## 2024-11-22 DIAGNOSIS — E78.5 HYPERLIPIDEMIA ASSOCIATED WITH TYPE 2 DIABETES MELLITUS: ICD-10-CM

## 2024-11-22 DIAGNOSIS — E11.69 HYPERLIPIDEMIA ASSOCIATED WITH TYPE 2 DIABETES MELLITUS: ICD-10-CM

## 2024-11-22 DIAGNOSIS — E11.59 HYPERTENSION ASSOCIATED WITH DIABETES: ICD-10-CM

## 2024-11-22 DIAGNOSIS — E78.00 HYPERCHOLESTEROLEMIA: ICD-10-CM

## 2024-11-22 DIAGNOSIS — N18.31 STAGE 3A CHRONIC KIDNEY DISEASE: ICD-10-CM

## 2024-11-22 DIAGNOSIS — M72.2 PLANTAR FASCIITIS: ICD-10-CM

## 2024-11-22 LAB
CHOLEST SERPL-MCNC: 112 MG/DL (ref 120–199)
CHOLEST/HDLC SERPL: 2.4 {RATIO} (ref 2–5)
HDLC SERPL-MCNC: 47 MG/DL (ref 40–75)
HDLC SERPL: 42 % (ref 20–50)
LDLC SERPL CALC-MCNC: 44.2 MG/DL (ref 63–159)
NONHDLC SERPL-MCNC: 65 MG/DL
TRIGL SERPL-MCNC: 104 MG/DL (ref 30–150)
TSH SERPL DL<=0.005 MIU/L-ACNC: 1.58 UIU/ML (ref 0.4–4)

## 2024-11-22 PROCEDURE — 80061 LIPID PANEL: CPT | Mod: HCNC | Performed by: INTERNAL MEDICINE

## 2024-11-22 PROCEDURE — 36415 COLL VENOUS BLD VENIPUNCTURE: CPT | Mod: HCNC | Performed by: INTERNAL MEDICINE

## 2024-11-22 PROCEDURE — 99999 PR PBB SHADOW E&M-EST. PATIENT-LVL V: CPT | Mod: PBBFAC,HCNC,, | Performed by: INTERNAL MEDICINE

## 2024-11-22 PROCEDURE — 84443 ASSAY THYROID STIM HORMONE: CPT | Mod: HCNC | Performed by: INTERNAL MEDICINE

## 2024-11-22 NOTE — PROGRESS NOTES
"Subjective:       Patient ID: Maria Alejandra De La Rosa is a 69 y.o. female.    Chief Complaint: Follow-up    This is a 69-year-old who presents today for follow-up she reports that she has been doing fairly well stays active and has been trying to keep her diabetes under control she did go see GI for her pancreatic abnormality and they recommended surveillance unless she develops symptoms in the future she continues to follow with endocrinology for her diabetes which has been well controlled she continues have issues with stress and maybe some depression at times she does well with low-dose Zoloft is not sure that she would like to increase it but she does plan to get some counseling as she was in her home during [place] when she lost her home since then she has not really wanted to leave the house as much.  She does have supportive family members.  She overall is feeling pretty well she does have some discomfort in the bottom of her right foot no injury but hurts when she walks on it    Follow-up      Review of Systems   Psychiatric/Behavioral:          Anxiety grief  Post traumatic stress lost home to [place] while In house  Had to rebuld        Objective:      Blood pressure 130/60, pulse 63, height 5' 6" (1.676 m), weight 102.6 kg (226 lb 3.1 oz), SpO2 99%.   Physical Exam  Constitutional:       General: She is not in acute distress.  HENT:      Head: Normocephalic.      Mouth/Throat:      Pharynx: Oropharynx is clear.   Eyes:      General: No scleral icterus.  Cardiovascular:      Rate and Rhythm: Normal rate and regular rhythm.      Heart sounds: Normal heart sounds. No murmur heard.     No friction rub. No gallop.   Pulmonary:      Effort: Pulmonary effort is normal. No respiratory distress.      Breath sounds: Normal breath sounds.   Abdominal:      General: Bowel sounds are normal.      Palpations: Abdomen is soft. There is no mass.      Tenderness: There is no abdominal tenderness.   Musculoskeletal:      Cervical back: Neck " supple.      Right foot: No deformity.      Left foot: No deformity.   Feet:      Right foot:      Protective Sensation: 4 sites tested.  4 sites sensed.      Left foot:      Protective Sensation: 4 sites tested.  4 sites sensed.   Skin:     Findings: No erythema.   Neurological:      Mental Status: She is alert.       Plantar fascitisis right foot   Assessment:       1. Hyperlipidemia associated with type 2 diabetes mellitus    2. Stage 3a chronic kidney disease    3. Adjustment reaction with anxiety and depression    4. Controlled type 2 diabetes mellitus without complication, without long-term current use of insulin    5. Plantar fasciitis    6. Hyperlipidemia, unspecified hyperlipidemia type    7. Hypercholesterolemia    8. Hypertension associated with diabetes        Plan:       Maria Alejandra was seen today for follow-up.    Diagnoses and all orders for this visit:    Hyperlipidemia associated with type 2 diabetes mellitus  Update labs and review she remains on atorvastatin  -     TSH; Future  -     Lipid Panel; Future    Stage 3a chronic kidney disease  Stable recent labs reviewed    Adjustment reaction with anxiety and depression  Tolerating Zoloft she does not wish to increase her dose but will call if she would like to do so she is planning on starting counseling    Hypertension blood pressure controlled remains on hydrochlorothiazide carvedilol and losartan    Controlled type 2 diabetes mellitus without complication, without long-term current use of insulin  She continues to follow with endocrinology remains on medical up in her [facility]    Recent upper respiratory infection improving continue conservative measures    Plantar fasciitis  Discussed gentle range-of-motion exercises stretching discussed podiatry she declined at this time will call if symptoms persist    Return for annual in 6 month sooner if concern     Visit today included increased complexity associated with the care of the episodic problem type 2  diabeters hyperlipidemia, hypertension , anxiety/derpession ckd, plantar fascitis  addressed and managing the longitudinal care of the patient due to the serious and/or complex managed problem(s).

## 2024-11-25 ENCOUNTER — TELEPHONE (OUTPATIENT)
Dept: INTERNAL MEDICINE | Facility: CLINIC | Age: 69
End: 2024-11-25
Payer: MEDICARE

## 2024-11-25 NOTE — TELEPHONE ENCOUNTER
----- Message from Caryn Ambrosio MD sent at 11/25/2024  7:37 AM CST -----  Call and notify pt her cholesterol looks good  Thyroid normal  Continue her current medicatons

## 2024-11-26 ENCOUNTER — PATIENT OUTREACH (OUTPATIENT)
Dept: ADMINISTRATIVE | Facility: HOSPITAL | Age: 69
End: 2024-11-26
Payer: MEDICARE

## 2024-11-26 LAB
LEFT EYE DM RETINOPATHY: NEGATIVE
RIGHT EYE DM RETINOPATHY: NEGATIVE

## 2024-12-04 DIAGNOSIS — E11.59 HYPERTENSION ASSOCIATED WITH DIABETES: ICD-10-CM

## 2024-12-04 DIAGNOSIS — I15.2 HYPERTENSION ASSOCIATED WITH DIABETES: ICD-10-CM

## 2024-12-04 RX ORDER — LOSARTAN POTASSIUM 100 MG/1
100 TABLET ORAL
Qty: 90 TABLET | Refills: 3 | Status: SHIPPED | OUTPATIENT
Start: 2024-12-04

## 2024-12-04 NOTE — TELEPHONE ENCOUNTER
Refill Decision Note   Maria Alejandra Rj  is requesting a refill authorization.  Brief Assessment and Rationale for Refill:  Approve     Medication Therapy Plan:         Comments:     Note composed:4:19 PM 12/04/2024

## 2024-12-04 NOTE — TELEPHONE ENCOUNTER
No care due was identified.  Health [facility] Embedded Care Due Messages. Reference number: [id number].   12/04/2024 4:43:35 AM CST

## 2025-02-10 ENCOUNTER — OFFICE VISIT (OUTPATIENT)
Dept: INTERNAL MEDICINE | Facility: CLINIC | Age: 70
End: 2025-02-10
Payer: MEDICARE

## 2025-02-10 VITALS
SYSTOLIC BLOOD PRESSURE: 132 MMHG | WEIGHT: 221.81 LBS | HEIGHT: 66 IN | BODY MASS INDEX: 35.65 KG/M2 | DIASTOLIC BLOOD PRESSURE: 72 MMHG | TEMPERATURE: 98 F | HEART RATE: 97 BPM | OXYGEN SATURATION: 98 %

## 2025-02-10 DIAGNOSIS — J06.9 UPPER RESPIRATORY TRACT INFECTION, UNSPECIFIED TYPE: Primary | ICD-10-CM

## 2025-02-10 PROCEDURE — 3008F BODY MASS INDEX DOCD: CPT | Mod: CPTII,S$GLB,, | Performed by: FAMILY MEDICINE

## 2025-02-10 PROCEDURE — 1126F AMNT PAIN NOTED NONE PRSNT: CPT | Mod: CPTII,S$GLB,, | Performed by: FAMILY MEDICINE

## 2025-02-10 PROCEDURE — 99214 OFFICE O/P EST MOD 30 MIN: CPT | Mod: S$GLB,,, | Performed by: FAMILY MEDICINE

## 2025-02-10 PROCEDURE — 99999 PR PBB SHADOW E&M-EST. PATIENT-LVL IV: CPT | Mod: PBBFAC,,, | Performed by: FAMILY MEDICINE

## 2025-02-10 PROCEDURE — 3078F DIAST BP <80 MM HG: CPT | Mod: CPTII,S$GLB,, | Performed by: FAMILY MEDICINE

## 2025-02-10 PROCEDURE — 3288F FALL RISK ASSESSMENT DOCD: CPT | Mod: CPTII,S$GLB,, | Performed by: FAMILY MEDICINE

## 2025-02-10 PROCEDURE — 1159F MED LIST DOCD IN RCRD: CPT | Mod: CPTII,S$GLB,, | Performed by: FAMILY MEDICINE

## 2025-02-10 PROCEDURE — 1101F PT FALLS ASSESS-DOCD LE1/YR: CPT | Mod: CPTII,S$GLB,, | Performed by: FAMILY MEDICINE

## 2025-02-10 PROCEDURE — 3075F SYST BP GE 130 - 139MM HG: CPT | Mod: CPTII,S$GLB,, | Performed by: FAMILY MEDICINE

## 2025-02-10 RX ORDER — AZITHROMYCIN 250 MG/1
TABLET, FILM COATED ORAL
Qty: 6 TABLET | Refills: 0 | Status: SHIPPED | OUTPATIENT
Start: 2025-02-10 | End: 2025-02-15

## 2025-02-10 NOTE — PROGRESS NOTES
"Subjective:       Patient ID: Maria Alejandra De La Rosa is a 70 y.o. female.    Chief Complaint: Sinus Problem, Cough, Headache, and Sore Throat    HPI  History of Present Illness    CHIEF COMPLAINT:  Maria Alejandra presents today with worsening cold symptoms for the past three days.    RESPIRATORY SYMPTOMS:  She reports chest heaviness and difficulty expectorating phlegm, with partial expulsion. She experiences intermittent sensations of feeling hot, describing one episode as feeling like "intense heat," but denies fever.    CURRENT MEDICATIONS:  She is self-treating with DayQuil and NyQuil for cold symptoms and has MuSelect at home for mucus relief.    FAMILY HISTORY:  Her brother recently had walking pneumonia.      ROS:  General: -fever, -chills, -fatigue, -weight gain, -weight loss  Eyes: -vision changes, -redness, -discharge  ENT: -ear pain, +nasal congestion, -sore throat  Cardiovascular: -chest pain, -palpitations, -lower extremity edema  Respiratory: -cough, -shortness of breath, +chest congestion  Gastrointestinal: -abdominal pain, -nausea, -vomiting, -diarrhea, -constipation, -blood in stool  Genitourinary: -dysuria, -hematuria, -frequency  Musculoskeletal: -joint pain, -muscle pain  Skin: -rash, -lesion  Neurological: -headache, -dizziness, -numbness, -tingling  Psychiatric: -anxiety, -depression, -sleep difficulty         Maria Alejandra De La Rosa is a 70 y.o. female for same day visit. PCP Dr. Ambrosio.     Review of Systems   Constitutional:  Positive for chills and fever. Negative for appetite change.   HENT:  Positive for congestion, sinus pressure and voice change. Negative for sore throat and trouble swallowing.    Respiratory:  Positive for cough. Negative for shortness of breath.    Gastrointestinal:  Negative for constipation, diarrhea, nausea and vomiting.   Genitourinary:  Negative for difficulty urinating.         Past Medical History:   Diagnosis Date    Basal cell carcinoma     Cataract     Depression     Diabetes mellitus " type 2 in obese 02/01/2018    Diabetes mellitus, type 2     DJD (degenerative joint disease)     Dysmetabolic syndrome     Hyperglycemia     Hypertension     Lumbar stenosis     Sleep apnea         Prior to Admission medications    Medication Sig Start Date End Date Taking? Authorizing Provider   alcohol swabs (BD ALCOHOL SWABS) PadM Apply 1 each topically as needed (blood sugar testing). 5/10/23   Jeannie Rogers PA-C   atorvastatin (LIPITOR) 10 MG tablet TAKE 1 TABLET EVERY DAY 4/25/24   Caryn Ambrosio MD   azelastine (ASTELIN) 137 mcg (0.1 %) nasal spray 1 spray (137 mcg total) by Nasal route 2 (two) times daily. 11/15/24 11/15/25  Alessia Moon PA-C   carvediloL (COREG) 12.5 MG tablet TAKE 1 TABLET TWICE DAILY WITH MEALS 4/12/24   Caryn Ambrosio MD   diclofenac sodium (VOLTAREN) 1 % Gel Apply 2 g topically 4 (four) times daily. 11/14/23   Carrington Lao MD   glipizide-metformin (METAGLIP) 5-500 mg per tablet Take 1 tablet by mouth 2 (two) times daily before meals. 9/9/24 9/9/25  Anmol Baugh MD   hydroCHLOROthiazide (HYDRODIURIL) 25 MG tablet TAKE 1 TABLET ONE TIME DAILY 4/29/24   Caryn Ambrosio MD   losartan (COZAAR) 100 MG tablet TAKE 1 TABLET EVERY DAY 12/4/24   Caryn Ambrosio MD   multivitamin capsule Take 1 capsule by mouth once daily.    Provider, Historical   ondansetron (ZOFRAN-ODT) 4 MG TbDL Take 1 tablet (4 mg total) by mouth daily as needed (nausea). 11/15/24   Alessia Moon PA-C   ONETOUCH DELICA LANCETS 33 gauge Misc Inject 1 lancet into the skin 2 (two) times daily. 4/12/18   Leida Valerio MD   semaglutide (OZEMPIC) 1 mg/dose (4 mg/3 mL) Inject 1 mg into the skin every 7 days. 3/7/24   Anmol Baugh MD   sertraline (ZOLOFT) 25 MG tablet TAKE 1 TABLET ONE TIME DAILY 9/5/24   Caryn Ambrosio MD   TRUE METRIX GLUCOSE TEST STRIP Strp USE 4 TIMES DAILY 10/14/24   Anmol Baugh MD   vitamin D 1000 units Tab Take 1,000  "Units by mouth once daily.    Provider, Historical        Past medical history, surgical history, and family medical history reviewed and updated as appropriate.    Medications and allergies reviewed.     Objective:          Vitals:    02/10/25 0933   BP: 132/72   BP Location: Right arm   Patient Position: Sitting   Pulse: 97   Temp: 98.3 °F (36.8 °C)   TempSrc: Oral   SpO2: 98%   Weight: 100.6 kg (221 lb 12.5 oz)   Height: 5' 6" (1.676 m)     Body mass index is 35.8 kg/m².  Physical Exam  Vitals and nursing note reviewed.   Constitutional:       General: She is not in acute distress.     Appearance: She is not ill-appearing, toxic-appearing or diaphoretic.   HENT:      Right Ear: Tympanic membrane, ear canal and external ear normal. There is no impacted cerumen.      Left Ear: Tympanic membrane, ear canal and external ear normal. There is no impacted cerumen.      Nose: Congestion and rhinorrhea present.      Mouth/Throat:      Pharynx: Posterior oropharyngeal erythema present. No oropharyngeal exudate.   Cardiovascular:      Rate and Rhythm: Normal rate and regular rhythm.      Pulses: Normal pulses.      Heart sounds: Normal heart sounds. No murmur heard.  Pulmonary:      Effort: Pulmonary effort is normal. No respiratory distress.      Breath sounds: Normal breath sounds. No wheezing.   Neurological:      Mental Status: She is alert and oriented to person, place, and time.   Psychiatric:         Mood and Affect: Mood normal.         Behavior: Behavior normal.         Lab Results   Component Value Date    WBC 6.16 05/22/2024    HGB 12.7 05/22/2024    HCT 38.7 05/22/2024     05/22/2024    CHOL 112 (L) 11/22/2024    TRIG 104 11/22/2024    HDL 47 11/22/2024    ALT 19 05/22/2024    AST 20 05/22/2024     09/03/2024    K 3.8 09/03/2024     09/03/2024    CREATININE 1.1 09/03/2024    BUN 15 09/03/2024    CO2 26 09/03/2024    TSH 1.579 11/22/2024    HGBA1C 6.0 (H) 09/03/2024       Assessment:       1. " Upper respiratory tract infection, unspecified type          Plan:   1. Upper respiratory tract infection, unspecified type  -     azithromycin (Z-PABLO) 250 MG tablet; Take 2 tablets by mouth on day 1; Take 1 tablet by mouth on days 2-5  Dispense: 6 tablet; Refill: 0        Otc symptomatic meds discussed and in AVS    Follow up should symptoms persist or worsen. If symptoms become severe, please report immediately to urgent care or emergency room for further evaluation. Patient voiced understanding.      No follow-ups on file.    Diogenes Coto MD  Family Medicine / Primary Care  [facility]  2/10/2025

## 2025-03-03 ENCOUNTER — LAB VISIT (OUTPATIENT)
Dept: LAB | Facility: HOSPITAL | Age: 70
End: 2025-03-03
Attending: HOSPITALIST
Payer: MEDICARE

## 2025-03-03 DIAGNOSIS — E11.65 TYPE 2 DIABETES MELLITUS WITH HYPERGLYCEMIA, WITHOUT LONG-TERM CURRENT USE OF INSULIN: ICD-10-CM

## 2025-03-03 LAB
ANION GAP SERPL CALC-SCNC: 10 MMOL/L (ref 8–16)
BUN SERPL-MCNC: 19 MG/DL (ref 8–23)
CALCIUM SERPL-MCNC: 9.4 MG/DL (ref 8.7–10.5)
CHLORIDE SERPL-SCNC: 106 MMOL/L (ref 95–110)
CO2 SERPL-SCNC: 25 MMOL/L (ref 23–29)
CREAT SERPL-MCNC: 1 MG/DL (ref 0.5–1.4)
EST. GFR  (NO RACE VARIABLE): 59.9 ML/MIN/1.73 M^2
ESTIMATED AVG GLUCOSE: 131 MG/DL (ref 68–131)
GLUCOSE SERPL-MCNC: 119 MG/DL (ref 70–110)
HBA1C MFR BLD: 6.2 % (ref 4–5.6)
POTASSIUM SERPL-SCNC: 3.9 MMOL/L (ref 3.5–5.1)
SODIUM SERPL-SCNC: 141 MMOL/L (ref 136–145)

## 2025-03-03 PROCEDURE — 80048 BASIC METABOLIC PNL TOTAL CA: CPT | Performed by: HOSPITALIST

## 2025-03-03 PROCEDURE — 83036 HEMOGLOBIN GLYCOSYLATED A1C: CPT | Performed by: HOSPITALIST

## 2025-03-10 ENCOUNTER — OFFICE VISIT (OUTPATIENT)
Dept: ENDOCRINOLOGY | Facility: CLINIC | Age: 70
End: 2025-03-10
Payer: MEDICARE

## 2025-03-10 VITALS
DIASTOLIC BLOOD PRESSURE: 64 MMHG | HEART RATE: 63 BPM | WEIGHT: 226.81 LBS | BODY MASS INDEX: 36.61 KG/M2 | SYSTOLIC BLOOD PRESSURE: 128 MMHG

## 2025-03-10 DIAGNOSIS — I15.2 HYPERTENSION ASSOCIATED WITH DIABETES: ICD-10-CM

## 2025-03-10 DIAGNOSIS — E11.65 TYPE 2 DIABETES MELLITUS WITH HYPERGLYCEMIA, WITHOUT LONG-TERM CURRENT USE OF INSULIN: Primary | ICD-10-CM

## 2025-03-10 DIAGNOSIS — E11.59 HYPERTENSION ASSOCIATED WITH DIABETES: ICD-10-CM

## 2025-03-10 DIAGNOSIS — N18.31 STAGE 3A CHRONIC KIDNEY DISEASE: ICD-10-CM

## 2025-03-10 DIAGNOSIS — E66.01 CLASS 2 SEVERE OBESITY DUE TO EXCESS CALORIES WITH SERIOUS COMORBIDITY AND BODY MASS INDEX (BMI) OF 35.0 TO 35.9 IN ADULT: ICD-10-CM

## 2025-03-10 DIAGNOSIS — E66.812 CLASS 2 SEVERE OBESITY DUE TO EXCESS CALORIES WITH SERIOUS COMORBIDITY AND BODY MASS INDEX (BMI) OF 35.0 TO 35.9 IN ADULT: ICD-10-CM

## 2025-03-10 DIAGNOSIS — E78.00 HYPERCHOLESTEROLEMIA: ICD-10-CM

## 2025-03-10 PROCEDURE — 99999 PR PBB SHADOW E&M-EST. PATIENT-LVL IV: CPT | Mod: PBBFAC,,, | Performed by: HOSPITALIST

## 2025-03-10 RX ORDER — GLIPIZIDE AND METFORMIN HCL 5; 500 MG/1; MG/1
1 TABLET, FILM COATED ORAL
Qty: 180 TABLET | Refills: 3 | Status: SHIPPED | OUTPATIENT
Start: 2025-03-10 | End: 2026-03-10

## 2025-03-10 RX ORDER — SEMAGLUTIDE 1.34 MG/ML
1 INJECTION, SOLUTION SUBCUTANEOUS
Qty: 12 ML | Refills: 3 | Status: SHIPPED | OUTPATIENT
Start: 2025-03-10

## 2025-03-10 RX ORDER — SEMAGLUTIDE 1.34 MG/ML
1 INJECTION, SOLUTION SUBCUTANEOUS
Qty: 3 ML | Refills: 2 | Status: SHIPPED | OUTPATIENT
Start: 2025-03-10 | End: 2026-03-10

## 2025-03-10 NOTE — ASSESSMENT & PLAN NOTE
- Diabetes is at goal, given most current A1C, and recent hospital admission for hyperglycemia  - Goal A1C for patient is 7%  - diabetes is complicated by dietary indiscretion, poor insight to diabetes> doing better  - Diabetic supplies/medications were reviewed this visit to ensure continue steady supplies  - Advised patient to get routine feet care, routine eye exam, plus routine maintenance screening  - Diabetes goal including glucose glucose and A1C goals discussed  - weight loss noted  - needs pharmacy assistant for Ozempic, 2024, 2025    Plan  - Adjustment made:  Continue Ozempic 1.0 mg once a week injection,  getting medication through Pharmacy Assistance Program> referral for 2025  - Continue glipizide/metformin combination pill, 5/500 mg  continue twice a day monitor for hypoglycemia  - Advised patient to continue monitor glucose, asked to filled glucose log and bring back for review at next office visit  - Encouragement of dietary modification, portion size control, decreasing carbohydrates intake  - Encouragement of dietary changes and weight loss  - Check lab work in 5-6 months re-evaluate  - Clear written instruction given on AVS. Follow up as scheduled

## 2025-03-10 NOTE — ASSESSMENT & PLAN NOTE
- Body mass index is 36.61 kg/m².  - dietary discussion as above  - continue to monitor weight  - continue Ozempic

## 2025-03-10 NOTE — PROGRESS NOTES
Subjective:      Patient ID: Maria Alejandra De La Rosa is a 70 y.o. female presented to [facility] on 3/10/2025.  Chief Complaint:  Diabetes    History of Present Illness: Maria Alejandra De La Rosa is a 70 y.o. female here for  type 2 diabetes  Other significant past medical history:  Obesity, hypertension      Diabetes mellitus Type 2  - Diagnosed w/ DM: in 2014  - Diabetes Education: No  - Patient with recent hospital admission 1/6/2023 to 1/8/2023 for uncontrolled hyperglycemia and possible right lower lobe pneumonia. Her glucose earlier in clinic was greater than 400 in the last week she has not been monitoring her glucose, she does not take insulin. ED workup is notable for mild leukocytosis hypokalemia, ISAI is present, hyperglycemic workup is negative for serum ketones and patient has a non acidotic pH.  A1c found to be 13.0% on 1/7/2023, has been improving with good glucose control, 8.8% 04/2023, 7.1% on 07/2023  - Doing better over the last few years, 5523-6534      Interval history:  Patient is here for diabetes management most recent A1c 6.2%, good glucose control  Patient has been making dietary changes at home.  Doing well. Getting Ozempic 1 mg  from with pharmacy assistant, needs paperwork to be sign 2025  Currently on Glipizide/metformin b.I.d.  No longer having hypoglycemia  Current weight: 226, previous weight 229 223, 222, 231    Current reported meds:    Ozempic 1.0 mg once a week injection  Glipizide/metformin 5/500 mg 1 tablet twice a day  Previous meds tried:              Trulicity >> too expensive, last used 6/2022              Glimepiride 4mg daily   Januvia 50mg daily  Home glucose checks: checks daily , Logs reviewed  - no obvious hypoglycemia.  Glucose trend much better over the last month  87  117  128  118  126  90  147  74  Diet/Exercise:   - Eating 3x meals per day, eating mostly preprepared TV dinner              - Drink: water, crystral light  Sleeping:   - Any difficulty falling  "asleep, staying asleep, nighttime coughing, or partner complaints of snoring?  No  - What time do you usually go to bed? What time do you usually wake up? 10-6  Weight trend: stable  Diabetes Related Hospitalization:  No  Hx of pancreatitis: No, denies  Family history of diabetes: Yes  Occupation:  Retired    Eye exam current (within one year): yes, DR: no, unknown  Reports cuts or ulcers on feet:   Denies, has podiatrist  Statin: Taking, ACE/ARB: Taking    Diabetes lab work  Lab Results   Component Value Date    HGBA1C 6.2 (H) 03/03/2025    HGBA1C 6.0 (H) 09/03/2024    HGBA1C 6.2 (H) 04/01/2024    HGBA1C 6.7 (H) 12/01/2023     Lab Results   Component Value Date    CPEPTIDE 2.86 07/31/2023      No results found for: "FRUCTOSAMINE"  Lab Results   Component Value Date    MICALBCREAT 4.5 09/03/2024     No results found for: "[id number]"    Diabetes Management Status: Reviewed this office visit  Screening or Prevention Patient's value Goal Complete/Controlled?   Lipid profile : 11/22/2024 Annually Yes     Dilated retinal exam : 10/10/2024 Annually Yes     Foot exam   : 05/21/2024 Annually Yes        Reviewed past surgical, medical, family, social history and updated as appropriate.  Review of Systems: see HPI above  Objective:   /64   Pulse 63   Wt 102.9 kg (226 lb 12.8 oz)   BMI 36.61 kg/m²   Body mass index is 36.61 kg/m².  Vital signs reviewed    Physical Exam  Vitals and nursing note reviewed.   Constitutional:       Appearance: Normal appearance. She is well-developed. She is obese. She is not ill-appearing.   Neck:      Thyroid: No thyromegaly.   Pulmonary:      Effort: Pulmonary effort is normal. No respiratory distress.   Musculoskeletal:         General: Normal range of motion.      Cervical back: Normal range of motion.   Neurological:      General: No focal deficit present.      Mental Status: She is alert. Mental status is at baseline.   Psychiatric:         Mood and Affect: Mood normal.         " Behavior: Behavior normal.     Lab Reviewed:  See results in subjective  Lab Results   Component Value Date    HGBA1C 6.2 (H) 03/03/2025     Lab Results   Component Value Date    CHOL 112 (L) 11/22/2024    HDL 47 11/22/2024    LDLCALC 44.2 (L) 11/22/2024    TRIG 104 11/22/2024    CHOLHDL 42.0 11/22/2024     Lab Results   Component Value Date     03/03/2025    K 3.9 03/03/2025     03/03/2025    CO2 25 03/03/2025     (H) 03/03/2025    BUN 19 03/03/2025    CREATININE 1.0 03/03/2025    CALCIUM 9.4 03/03/2025    PHOS 3.9 04/01/2024    PROT 6.7 05/22/2024    ALBUMIN 3.5 05/22/2024    BILITOT 0.6 05/22/2024    ALKPHOS 102 05/22/2024    AST 20 05/22/2024    ALT 19 05/22/2024    ANIONGAP 10 03/03/2025    EGFRNORACEVR 59.9 (A) 03/03/2025    TSH 1.579 11/22/2024     Assessment     1. Type 2 diabetes mellitus with hyperglycemia, without long-term current use of insulin  Ambulatory referral/consult to Pharmacy Assistance    semaglutide (OZEMPIC) 1 mg/dose (4 mg/3 mL)    glipizide-metformin (METAGLIP) 5-500 mg per tablet    Hemoglobin A1C    Basic Metabolic Panel    Microalbumin/Creatinine Ratio, Urine    semaglutide (OZEMPIC) 1 mg/dose (4 mg/3 mL)      2. Class 2 severe obesity due to excess calories with serious comorbidity and body mass index (BMI) of 35.0 to 35.9 in adult        3. Hypertension associated with diabetes        4. Hypercholesterolemia        5. Stage 3a chronic kidney disease          Plan     Type 2 diabetes mellitus with hyperglycemia, without long-term current use of insulin  - Diabetes is at goal, given most current A1C, and recent hospital admission for hyperglycemia  - Goal A1C for patient is 7%  - diabetes is complicated by dietary indiscretion, poor insight to diabetes> doing better  - Diabetic supplies/medications were reviewed this visit to ensure continue steady supplies  - Advised patient to get routine feet care, routine eye exam, plus routine maintenance screening  - Diabetes  goal including glucose glucose and A1C goals discussed  - weight loss noted  - needs pharmacy assistant for Ozempic, 2024, 2025    Plan  - Adjustment made:  Continue Ozempic 1.0 mg once a week injection,  getting medication through Pharmacy Assistance Program> referral for 2025  - Continue glipizide/metformin combination pill, 5/500 mg  continue twice a day monitor for hypoglycemia  - Advised patient to continue monitor glucose, asked to filled glucose log and bring back for review at next office visit  - Encouragement of dietary modification, portion size control, decreasing carbohydrates intake  - Encouragement of dietary changes and weight loss  - Check lab work in 5-6 months re-evaluate  - Clear written instruction given on AVS. Follow up as scheduled    Class 2 severe obesity due to excess calories with serious comorbidity and body mass index (BMI) of 35.0 to 35.9 in adult  - Body mass index is 36.61 kg/m².  - dietary discussion as above  - continue to monitor weight  - continue Ozempic    Hypertension associated with diabetes  - Blood pressure review in clinic today  - On  blood pressure medication  - Manage by PCP     Hypercholesterolemia  - lipid panel review today  - ASCVD Risk below: Statin: Taking  The ASCVD Risk score (Tea DK, et al., 2019) failed to calculate for the following reasons:    The valid total cholesterol range is 130 to 320 mg/dL    Stage 3a chronic kidney disease  - Renal function reviewed on lab work today, stable   - Renally Adjust diabetes medication, avoid hypoglycemia  - continue routine monitoring  - PCP/nephro    Advised patient to follow up with PCP for routine health maintenance care.   RTC in 5-6 months    Visit today included increased complexity associated with the care of the episodic problem addressed and managing the longitudinal care of the patient due to the serious and/or complex managed problem(s).   Including: Type 2 diabetes, obesity, hypertension,  hyperlipidemia.      Anmol Baugh M.D.  Endocrinology  [facility]  3/10/2025      Disclaimer: This note has been generated in part with the use of voice-recognition software. There may be typographical errors that have been missed during proof-reading.

## 2025-03-13 ENCOUNTER — TELEPHONE (OUTPATIENT)
Dept: PHARMACY | Facility: CLINIC | Age: 70
End: 2025-03-13
Payer: MEDICARE

## 2025-03-13 NOTE — TELEPHONE ENCOUNTER
We have reached out to Maria Alejandra De La Rosa to inform her of the [employer] Patient Assistance Program re-enrollment process for Ozempic 1mg. Patient must provided the following documentation to re-apply for 2025: Proof of household Income (such as social security award letter, pension statement or 3 consecutive pay stubs), Copy of all insurance cards (front and back), and Completed Medication Access Center Authorization Forms         [facility] [place]  Pharmacy Patient Assistance

## 2025-03-13 NOTE — LETTER
"     March 13, 2025    Maria Alejandra [place] [id number]             Dear Maria Alejandra De La Rosa    My Name is Jorgito Lorenzana. I am a Pharmacy Technician reaching out on behalf of [facility]s [facility] Patient Assistance Team to inform you of the [employer] re-enrollment process.  We currently don't need any information from you at this time. However, to continue receiving Ozempic 1mg free of charge, the [employer] may require the following documentation.     Proof of household Income for 2025 (such as social security statement, 1099 form, pension statement or 3 consecutive pay stubs   Copy of 2025 Insurance cards( front and back)  [facility] Authorization & Intake Forms  "LIS"/Medicare Extra Help Denial Letter     I will work with your Provider to get your re-enrollment application signed and submitted to the program for review. If you have received a re-enrollment letter and/or application from [employer] you may disregard.  If you have stopped taking Ozempic 1mg or if your dosage has changed please let me know ASAP.       If you have any questions or concerns regarding your 2025 re-enrollment process, please give me a call. Thank you for choosing [facility] for your healthcare needs.        Sincerely  Jorgito HARRIS @[phone]   Pharmacy Patient Assistance Team  [address] [place] [address]  [id number]  [place] [address]  Fax: [phone]  Email: [email or web address]        "

## 2025-03-23 ENCOUNTER — PATIENT MESSAGE (OUTPATIENT)
Dept: ENDOCRINOLOGY | Facility: CLINIC | Age: 70
End: 2025-03-23
Payer: MEDICARE

## 2025-04-02 DIAGNOSIS — I15.2 HYPERTENSION ASSOCIATED WITH DIABETES: ICD-10-CM

## 2025-04-02 DIAGNOSIS — E11.59 HYPERTENSION ASSOCIATED WITH DIABETES: ICD-10-CM

## 2025-04-02 RX ORDER — CARVEDILOL 12.5 MG/1
12.5 TABLET ORAL 2 TIMES DAILY WITH MEALS
Qty: 180 TABLET | Refills: 2 | Status: SHIPPED | OUTPATIENT
Start: 2025-04-02

## 2025-04-03 NOTE — TELEPHONE ENCOUNTER
Provider Staff:  Action required for this patient    Requires labs      Please see care gap opportunities below in Care Due Message.    Thanks!  [facility]     Appointments      Date Provider   Last Visit   11/22/2024 Caryn Ambrosio MD   Next Visit   5/28/2025 Caryn Ambrosio MD     Refill Decision Note   Maria Alejandra De La Rosa  is requesting a refill authorization.  Brief Assessment and Rationale for Refill:  Approve     Medication Therapy Plan:         Comments:     Note composed:7:06 PM 04/02/2025

## 2025-04-04 DIAGNOSIS — E78.5 HYPERLIPIDEMIA ASSOCIATED WITH TYPE 2 DIABETES MELLITUS: ICD-10-CM

## 2025-04-04 DIAGNOSIS — E11.69 HYPERLIPIDEMIA ASSOCIATED WITH TYPE 2 DIABETES MELLITUS: ICD-10-CM

## 2025-04-04 DIAGNOSIS — E78.00 HYPERCHOLESTEROLEMIA: ICD-10-CM

## 2025-04-04 RX ORDER — ATORVASTATIN CALCIUM 10 MG/1
10 TABLET, FILM COATED ORAL
Qty: 90 TABLET | Refills: 0 | Status: SHIPPED | OUTPATIENT
Start: 2025-04-04

## 2025-04-04 NOTE — TELEPHONE ENCOUNTER
No care due was identified.  [facility] Embedded Care Due Messages. Reference number: [id number].   4/04/2025 10:36:35 AM CDT

## 2025-04-04 NOTE — TELEPHONE ENCOUNTER
Refill Decision Note   Maria Alejandra Rj  is requesting a refill authorization.  Brief Assessment and Rationale for Refill:  Approve     Medication Therapy Plan:        Comments:     Note composed:10:43 AM 04/04/2025

## 2025-04-09 DIAGNOSIS — I15.2 HYPERTENSION ASSOCIATED WITH DIABETES: ICD-10-CM

## 2025-04-09 DIAGNOSIS — E11.59 HYPERTENSION ASSOCIATED WITH DIABETES: ICD-10-CM

## 2025-04-09 RX ORDER — HYDROCHLOROTHIAZIDE 25 MG/1
25 TABLET ORAL
Qty: 90 TABLET | Refills: 2 | Status: SHIPPED | OUTPATIENT
Start: 2025-04-09

## 2025-04-09 NOTE — TELEPHONE ENCOUNTER
Refill Decision Note   Maria Alejandra Rj  is requesting a refill authorization.  Brief Assessment and Rationale for Refill:  Approve     Medication Therapy Plan:        Comments:     Note composed:6:14 PM 04/09/2025

## 2025-04-09 NOTE — TELEPHONE ENCOUNTER
No care due was identified.  [facility] Embedded Care Due Messages. Reference number: [id number].   4/09/2025 5:14:50 PM CDT

## 2025-05-13 NOTE — PATIENT INSTRUCTIONS
Brief Note;  CM spoke with wife who presents concerns re  d/c plan.  CM notified Dr Ribeiro and he will see patient/spouse to address her concerns.    Referral sent via [facility] to  [facility] with update re plan for d/c today and need for IV abx long term. CM also informed facility of plan for line placement today.    CM called Eveline Moore (admission/[facility]- [phone]).    Viv will return this CM's call re plan to return.  Barrier:  Patient has sitter at bedside last night.  Patient will need to be sitter free 24 hrs before he can be d/c to a facility.     2:51p  CM met with patent's wife and provided updates.    CM continuing to follow.  Loli Peñaloza, BSW, CRM  [phone]       Glipizide/Metformin 5/500mg 1 pill  one a day once  Ozempic 1 mg once a week      Goal blood sugar in the morning, before breakfast:   Glucose goal AFTER MEALS:    2 Hour after: less than 140  Before going to bed: 100-140  Do not go to bed with glucose less than 100  Have a small snack if glucose is lower than 100      Please check glucose 1x times a day (before breakfast).   You can keep track of the glucose with Glucose logs or any papers  Please filled out and bring back to next office visit for me to review  Document any (LOW BLOOD GLUCOSE) hypoglycemia  episode with date and time for me to review      Please contact Pharmacy Patient Assistance of Ozempic 2025  Please contact: Moustapha Lorenzana    Phone: [phone]      We will plan an in-clinic visit in 5 months, with labs prior to that appointment.    Contact information:  Anmol Baugh M.D  [facility] [address] [facility] [address], [address]  [place] [address]    Office:  [phone]  Fax:  [phone]

## 2025-05-28 ENCOUNTER — LAB VISIT (OUTPATIENT)
Dept: LAB | Facility: HOSPITAL | Age: 70
End: 2025-05-28
Attending: INTERNAL MEDICINE
Payer: MEDICARE

## 2025-05-28 ENCOUNTER — OFFICE VISIT (OUTPATIENT)
Dept: INTERNAL MEDICINE | Facility: CLINIC | Age: 70
End: 2025-05-28
Payer: MEDICARE

## 2025-05-28 VITALS
HEART RATE: 62 BPM | WEIGHT: 226.88 LBS | SYSTOLIC BLOOD PRESSURE: 128 MMHG | OXYGEN SATURATION: 97 % | BODY MASS INDEX: 36.46 KG/M2 | HEIGHT: 66 IN | DIASTOLIC BLOOD PRESSURE: 68 MMHG

## 2025-05-28 DIAGNOSIS — E11.9 CONTROLLED TYPE 2 DIABETES MELLITUS WITHOUT COMPLICATION, WITHOUT LONG-TERM CURRENT USE OF INSULIN: ICD-10-CM

## 2025-05-28 DIAGNOSIS — E11.69 HYPERLIPIDEMIA ASSOCIATED WITH TYPE 2 DIABETES MELLITUS: ICD-10-CM

## 2025-05-28 DIAGNOSIS — E78.5 HYPERLIPIDEMIA ASSOCIATED WITH TYPE 2 DIABETES MELLITUS: ICD-10-CM

## 2025-05-28 DIAGNOSIS — E11.59 HYPERTENSION ASSOCIATED WITH DIABETES: ICD-10-CM

## 2025-05-28 DIAGNOSIS — I15.2 HYPERTENSION ASSOCIATED WITH DIABETES: ICD-10-CM

## 2025-05-28 DIAGNOSIS — Z12.31 ENCOUNTER FOR SCREENING MAMMOGRAM FOR BREAST CANCER: ICD-10-CM

## 2025-05-28 DIAGNOSIS — Z00.00 ANNUAL PHYSICAL EXAM: Primary | ICD-10-CM

## 2025-05-28 DIAGNOSIS — N18.31 STAGE 3A CHRONIC KIDNEY DISEASE: ICD-10-CM

## 2025-05-28 DIAGNOSIS — Z78.0 POSTMENOPAUSAL: ICD-10-CM

## 2025-05-28 LAB
ABSOLUTE EOSINOPHIL (OHS): 0.46 K/UL
ABSOLUTE MONOCYTE (OHS): 0.68 K/UL (ref 0.3–1)
ABSOLUTE NEUTROPHIL COUNT (OHS): 4.22 K/UL (ref 1.8–7.7)
ALBUMIN SERPL BCP-MCNC: 3.8 G/DL (ref 3.5–5.2)
ALP SERPL-CCNC: 99 UNIT/L (ref 40–150)
ALT SERPL W/O P-5'-P-CCNC: 19 UNIT/L (ref 10–44)
ANION GAP (OHS): 11 MMOL/L (ref 8–16)
AST SERPL-CCNC: 19 UNIT/L (ref 11–45)
BASOPHILS # BLD AUTO: 0.02 K/UL
BASOPHILS NFR BLD AUTO: 0.3 %
BILIRUB SERPL-MCNC: 0.6 MG/DL (ref 0.1–1)
BUN SERPL-MCNC: 20 MG/DL (ref 8–23)
CALCIUM SERPL-MCNC: 9.3 MG/DL (ref 8.7–10.5)
CHLORIDE SERPL-SCNC: 106 MMOL/L (ref 95–110)
CHOLEST SERPL-MCNC: 123 MG/DL (ref 120–199)
CHOLEST/HDLC SERPL: 2.2 {RATIO} (ref 2–5)
CO2 SERPL-SCNC: 25 MMOL/L (ref 23–29)
CREAT SERPL-MCNC: 1.1 MG/DL (ref 0.5–1.4)
ERYTHROCYTE [DISTWIDTH] IN BLOOD BY AUTOMATED COUNT: 13.2 % (ref 11.5–14.5)
GFR SERPLBLD CREATININE-BSD FMLA CKD-EPI: 53 ML/MIN/1.73/M2
GLUCOSE SERPL-MCNC: 127 MG/DL (ref 70–110)
HCT VFR BLD AUTO: 40.5 % (ref 37–48.5)
HDLC SERPL-MCNC: 55 MG/DL (ref 40–75)
HDLC SERPL: 44.7 % (ref 20–50)
HGB BLD-MCNC: 12.7 GM/DL (ref 12–16)
IMM GRANULOCYTES # BLD AUTO: 0.02 K/UL (ref 0–0.04)
IMM GRANULOCYTES NFR BLD AUTO: 0.3 % (ref 0–0.5)
LDLC SERPL CALC-MCNC: 49.6 MG/DL (ref 63–159)
LYMPHOCYTES # BLD AUTO: 1.42 K/UL (ref 1–4.8)
MCH RBC QN AUTO: 28.9 PG (ref 27–31)
MCHC RBC AUTO-ENTMCNC: 31.4 G/DL (ref 32–36)
MCV RBC AUTO: 92 FL (ref 82–98)
NONHDLC SERPL-MCNC: 68 MG/DL
NUCLEATED RBC (/100WBC) (OHS): 0 /100 WBC
PLATELET # BLD AUTO: 247 K/UL (ref 150–450)
PMV BLD AUTO: 9.9 FL (ref 9.2–12.9)
POTASSIUM SERPL-SCNC: 4.1 MMOL/L (ref 3.5–5.1)
PROT SERPL-MCNC: 6.9 GM/DL (ref 6–8.4)
RBC # BLD AUTO: 4.39 M/UL (ref 4–5.4)
RELATIVE EOSINOPHIL (OHS): 6.7 %
RELATIVE LYMPHOCYTE (OHS): 20.8 % (ref 18–48)
RELATIVE MONOCYTE (OHS): 10 % (ref 4–15)
RELATIVE NEUTROPHIL (OHS): 61.9 % (ref 38–73)
SODIUM SERPL-SCNC: 142 MMOL/L (ref 136–145)
TRIGL SERPL-MCNC: 92 MG/DL (ref 30–150)
TSH SERPL-ACNC: 2.89 UIU/ML (ref 0.4–4)
WBC # BLD AUTO: 6.82 K/UL (ref 3.9–12.7)

## 2025-05-28 PROCEDURE — 99397 PER PM REEVAL EST PAT 65+ YR: CPT | Mod: HCNC,S$GLB,, | Performed by: INTERNAL MEDICINE

## 2025-05-28 PROCEDURE — 99999 PR PBB SHADOW E&M-EST. PATIENT-LVL V: CPT | Mod: PBBFAC,HCNC,, | Performed by: INTERNAL MEDICINE

## 2025-05-28 PROCEDURE — 84443 ASSAY THYROID STIM HORMONE: CPT | Mod: HCNC

## 2025-05-28 PROCEDURE — 80053 COMPREHEN METABOLIC PANEL: CPT | Mod: HCNC

## 2025-05-28 PROCEDURE — 1160F RVW MEDS BY RX/DR IN RCRD: CPT | Mod: CPTII,HCNC,S$GLB, | Performed by: INTERNAL MEDICINE

## 2025-05-28 PROCEDURE — 4010F ACE/ARB THERAPY RXD/TAKEN: CPT | Mod: CPTII,HCNC,S$GLB, | Performed by: INTERNAL MEDICINE

## 2025-05-28 PROCEDURE — 85025 COMPLETE CBC W/AUTO DIFF WBC: CPT | Mod: HCNC

## 2025-05-28 PROCEDURE — 1101F PT FALLS ASSESS-DOCD LE1/YR: CPT | Mod: CPTII,HCNC,S$GLB, | Performed by: INTERNAL MEDICINE

## 2025-05-28 PROCEDURE — 1159F MED LIST DOCD IN RCRD: CPT | Mod: CPTII,HCNC,S$GLB, | Performed by: INTERNAL MEDICINE

## 2025-05-28 PROCEDURE — 3044F HG A1C LEVEL LT 7.0%: CPT | Mod: CPTII,HCNC,S$GLB, | Performed by: INTERNAL MEDICINE

## 2025-05-28 PROCEDURE — 80061 LIPID PANEL: CPT | Mod: HCNC

## 2025-05-28 PROCEDURE — 36415 COLL VENOUS BLD VENIPUNCTURE: CPT | Mod: HCNC

## 2025-05-28 PROCEDURE — 3008F BODY MASS INDEX DOCD: CPT | Mod: CPTII,HCNC,S$GLB, | Performed by: INTERNAL MEDICINE

## 2025-05-28 PROCEDURE — 1126F AMNT PAIN NOTED NONE PRSNT: CPT | Mod: CPTII,HCNC,S$GLB, | Performed by: INTERNAL MEDICINE

## 2025-05-28 PROCEDURE — 3078F DIAST BP <80 MM HG: CPT | Mod: CPTII,HCNC,S$GLB, | Performed by: INTERNAL MEDICINE

## 2025-05-28 PROCEDURE — 3074F SYST BP LT 130 MM HG: CPT | Mod: CPTII,HCNC,S$GLB, | Performed by: INTERNAL MEDICINE

## 2025-05-28 PROCEDURE — 3288F FALL RISK ASSESSMENT DOCD: CPT | Mod: CPTII,HCNC,S$GLB, | Performed by: INTERNAL MEDICINE

## 2025-05-28 NOTE — PROGRESS NOTES
"Subjective:       Patient ID: Maria Alejandra De La Rosa is a 70 y.o. female.    Chief Complaint: Annual Exam  70-year-old who presents today for physical patient reports in general doing well with her diabetes she checks her sugars daily and her numbers have been doing pretty well she continues to tolerate her Ozempic and metaglip she continues to follow with her endocrinologist does get some trouble at times with alternating constipation and diarrhea relates to her medicines.  She had been having some issues with her feet plantar fasciitis she switch to different shoes and that has helped although sometimes she gets some discomfort in her heels.  She has continued to see a counselor for her anxiety depression and is on low-dose Zoloft she does not wish to increase the dose at this time but may follow up with her psychiatrist and again in the future.    HPI  Review of Systems   Gastrointestinal:         Alternating contipaiton and diarrhea   Psychiatric/Behavioral:          Anxiety /depression stable  Sees counselor        Objective:      Blood pressure 128/68, pulse 62, height 5' 6" (1.676 m), weight 102.9 kg (226 lb 13.7 oz), SpO2 97%.   Physical Exam  Constitutional:       General: She is not in acute distress.  HENT:      Head: Normocephalic.      Mouth/Throat:      Pharynx: Oropharynx is clear.   Eyes:      General: No scleral icterus.  Cardiovascular:      Rate and Rhythm: Normal rate and regular rhythm.      Heart sounds: Normal heart sounds. No murmur heard.     No friction rub. No gallop.   Pulmonary:      Effort: Pulmonary effort is normal. No respiratory distress.      Breath sounds: Normal breath sounds.   Abdominal:      General: Bowel sounds are normal.      Palpations: Abdomen is soft. There is no mass.      Tenderness: There is no abdominal tenderness.   Musculoskeletal:      Cervical back: Neck supple.      Right foot: No deformity.      Left foot: No deformity.   Feet:      Right foot:      Protective " Sensation: 4 sites tested.  4 sites sensed.      Left foot:      Protective Sensation: 4 sites tested.     Skin:     Findings: No erythema.   Neurological:      Mental Status: She is alert.   Psychiatric:         Mood and Affect: Mood normal.         Assessment:       1. Annual physical exam    2. Postmenopausal    3. Controlled type 2 diabetes mellitus without complication, without long-term current use of insulin    4. Hypertension associated with diabetes    5. Hyperlipidemia associated with type 2 diabetes mellitus    6. Encounter for screening mammogram for breast cancer    7. Stage 3a chronic kidney disease        Plan:       Maria Alejandra was seen today for annual exam.    Diagnoses and all orders for this visit:    Annual physical exam    Postmenopausal  -     DXA Bone Density Axial Skeleton 1 or more sites; Future    Controlled type 2 diabetes mellitus without complication, without long-term current use of insulin  Most recent A1c reviewed and she has follow up with Endocrine planned on Ozempic and metaglip  -     Comprehensive Metabolic Panel; Future  -     Lipid Panel; Future  -     TSH; Future  -     CBC Auto Differential; Future    Hypertension associated with diabetes  Blood pressure controlled remains on losartan hydrochlorothiazide  -     Comprehensive Metabolic Panel; Future  -     Lipid Panel; Future  -     TSH; Future  -     CBC Auto Differential; Future\    CKD update labs and review     Hyperlipidemia associated with type 2 diabetes mellitus  Continue atorvastatin update labs and review  -     Comprehensive Metabolic Panel; Future  -     Lipid Panel; Future  -     TSH; Future  -     CBC Auto Differential; Future    Encounter for screening mammogram for breast cancer  -     Mammo Digital Screening Bilat w/ Evert (XPD); Future    Plantar fascitis heel pain improved with supportive shoes     Her alternating constipation diarrhea we discussed increasing dietary fiber consider starting Colace to prevent  constipation with her Ozempic    Follow-up 6 months sooner if concern

## 2025-05-30 ENCOUNTER — RESULTS FOLLOW-UP (OUTPATIENT)
Dept: INTERNAL MEDICINE | Facility: CLINIC | Age: 70
End: 2025-05-30

## 2025-06-16 DIAGNOSIS — E11.69 HYPERLIPIDEMIA ASSOCIATED WITH TYPE 2 DIABETES MELLITUS: ICD-10-CM

## 2025-06-16 DIAGNOSIS — E78.5 HYPERLIPIDEMIA ASSOCIATED WITH TYPE 2 DIABETES MELLITUS: ICD-10-CM

## 2025-06-16 DIAGNOSIS — E78.00 HYPERCHOLESTEROLEMIA: ICD-10-CM

## 2025-06-16 RX ORDER — ATORVASTATIN CALCIUM 10 MG/1
10 TABLET, FILM COATED ORAL
Qty: 90 TABLET | Refills: 3 | Status: SHIPPED | OUTPATIENT
Start: 2025-06-16

## 2025-06-16 NOTE — TELEPHONE ENCOUNTER
Refill Decision Note   Maria Alejandra Rj  is requesting a refill authorization.  Brief Assessment and Rationale for Refill:  Approve     Medication Therapy Plan:         Comments:     Note composed:7:50 AM 06/16/2025

## 2025-06-16 NOTE — TELEPHONE ENCOUNTER
No care due was identified.  [facility] Embedded Care Due Messages. Reference number: [id number].   6/16/2025 2:20:06 AM CDT

## 2025-06-23 DIAGNOSIS — F33.2 SEVERE EPISODE OF RECURRENT MAJOR DEPRESSIVE DISORDER, WITHOUT PSYCHOTIC FEATURES: ICD-10-CM

## 2025-06-23 RX ORDER — SERTRALINE HYDROCHLORIDE 25 MG/1
25 TABLET, FILM COATED ORAL
Qty: 90 TABLET | Refills: 3 | Status: SHIPPED | OUTPATIENT
Start: 2025-06-23

## 2025-06-23 NOTE — TELEPHONE ENCOUNTER
No care due was identified.  [facility] Embedded Care Due Messages. Reference number: [id number].   6/23/2025 1:57:13 AM CDT

## 2025-06-23 NOTE — TELEPHONE ENCOUNTER
Refill Decision Note   Maria Alejandra Rj  is requesting a refill authorization.  Brief Assessment and Rationale for Refill:  Approve     Medication Therapy Plan:         Comments:     Note composed:7:19 AM 06/23/2025

## 2025-06-27 ENCOUNTER — HOSPITAL ENCOUNTER (OUTPATIENT)
Dept: RADIOLOGY | Facility: HOSPITAL | Age: 70
Discharge: HOME | End: 2025-06-27
Attending: INTERNAL MEDICINE
Payer: MEDICARE

## 2025-06-27 ENCOUNTER — HOSPITAL ENCOUNTER (OUTPATIENT)
Dept: RADIOLOGY | Facility: CLINIC | Age: 70
Discharge: HOME | End: 2025-06-27
Attending: INTERNAL MEDICINE
Payer: MEDICARE

## 2025-06-27 DIAGNOSIS — Z12.31 ENCOUNTER FOR SCREENING MAMMOGRAM FOR BREAST CANCER: ICD-10-CM

## 2025-06-27 DIAGNOSIS — Z78.0 POSTMENOPAUSAL: ICD-10-CM

## 2025-06-27 PROCEDURE — 77091 TBS TECHL CALCULATION ONLY: CPT | Mod: HCNC

## 2025-06-27 PROCEDURE — 77067 SCR MAMMO BI INCL CAD: CPT | Mod: 26,HCNC,, | Performed by: RADIOLOGY

## 2025-06-27 PROCEDURE — 77080 DXA BONE DENSITY AXIAL: CPT | Mod: 26,HCNC,, | Performed by: INTERNAL MEDICINE

## 2025-06-27 PROCEDURE — 77063 BREAST TOMOSYNTHESIS BI: CPT | Mod: 26,HCNC,, | Performed by: RADIOLOGY

## 2025-06-27 PROCEDURE — 77092 TBS I&R FX RSK QHP: CPT | Mod: HCNC,S$GLB,, | Performed by: INTERNAL MEDICINE

## 2025-06-27 PROCEDURE — 77063 BREAST TOMOSYNTHESIS BI: CPT | Mod: TC,HCNC

## 2025-07-03 ENCOUNTER — RESULTS FOLLOW-UP (OUTPATIENT)
Dept: INTERNAL MEDICINE | Facility: CLINIC | Age: 70
End: 2025-07-03

## 2025-07-07 ENCOUNTER — TELEPHONE (OUTPATIENT)
Dept: RADIOLOGY | Facility: HOSPITAL | Age: 70
End: 2025-07-07
Payer: MEDICARE

## 2025-07-07 NOTE — TELEPHONE ENCOUNTER
Spoke with patient and explained mammogram findings.Patient expressed understanding of results. Patient scheduled abnormal mammogram follow up appointment at [facility] on 7/10/2025.

## 2025-07-10 ENCOUNTER — HOSPITAL ENCOUNTER (OUTPATIENT)
Dept: RADIOLOGY | Facility: HOSPITAL | Age: 70
Discharge: HOME | End: 2025-07-10
Attending: INTERNAL MEDICINE
Payer: MEDICARE

## 2025-07-10 DIAGNOSIS — R92.8 ABNORMAL FINDING ON BREAST IMAGING: ICD-10-CM

## 2025-07-10 PROCEDURE — 77065 DX MAMMO INCL CAD UNI: CPT | Mod: 26,HCNC,LT, | Performed by: RADIOLOGY

## 2025-07-10 PROCEDURE — 77061 BREAST TOMOSYNTHESIS UNI: CPT | Mod: 26,HCNC,LT, | Performed by: RADIOLOGY

## 2025-07-10 PROCEDURE — 77061 BREAST TOMOSYNTHESIS UNI: CPT | Mod: TC,HCNC,LT

## 2025-08-05 ENCOUNTER — LAB VISIT (OUTPATIENT)
Dept: LAB | Facility: HOSPITAL | Age: 70
End: 2025-08-05
Attending: HOSPITALIST
Payer: MEDICARE

## 2025-08-05 DIAGNOSIS — E11.65 TYPE 2 DIABETES MELLITUS WITH HYPERGLYCEMIA, WITHOUT LONG-TERM CURRENT USE OF INSULIN: ICD-10-CM

## 2025-08-05 LAB
ANION GAP (OHS): 12 MMOL/L (ref 8–16)
BUN SERPL-MCNC: 24 MG/DL (ref 8–23)
CALCIUM SERPL-MCNC: 9.3 MG/DL (ref 8.7–10.5)
CHLORIDE SERPL-SCNC: 104 MMOL/L (ref 95–110)
CO2 SERPL-SCNC: 24 MMOL/L (ref 23–29)
CREAT SERPL-MCNC: 1.4 MG/DL (ref 0.5–1.4)
EAG (OHS): 134 MG/DL (ref 68–131)
GFR SERPLBLD CREATININE-BSD FMLA CKD-EPI: 40 ML/MIN/1.73/M2
GLUCOSE SERPL-MCNC: 141 MG/DL (ref 70–110)
HBA1C MFR BLD: 6.3 % (ref 4–5.6)
POTASSIUM SERPL-SCNC: 3.5 MMOL/L (ref 3.5–5.1)
SODIUM SERPL-SCNC: 140 MMOL/L (ref 136–145)

## 2025-08-05 PROCEDURE — 82310 ASSAY OF CALCIUM: CPT | Mod: HCNC

## 2025-08-05 PROCEDURE — 36415 COLL VENOUS BLD VENIPUNCTURE: CPT | Mod: HCNC

## 2025-08-05 PROCEDURE — 83036 HEMOGLOBIN GLYCOSYLATED A1C: CPT | Mod: HCNC

## 2025-08-09 ENCOUNTER — HOSPITAL ENCOUNTER (EMERGENCY)
Facility: HOSPITAL | Age: 70
Discharge: HOME | End: 2025-08-09
Attending: STUDENT IN AN ORGANIZED HEALTH CARE EDUCATION/TRAINING PROGRAM
Payer: MEDICARE

## 2025-08-09 VITALS
WEIGHT: 220 LBS | TEMPERATURE: 98 F | SYSTOLIC BLOOD PRESSURE: 129 MMHG | DIASTOLIC BLOOD PRESSURE: 61 MMHG | BODY MASS INDEX: 35.36 KG/M2 | HEIGHT: 66 IN | HEART RATE: 72 BPM | RESPIRATION RATE: 20 BRPM | OXYGEN SATURATION: 80 %

## 2025-08-09 DIAGNOSIS — R11.10 VOMITING: ICD-10-CM

## 2025-08-09 DIAGNOSIS — N20.1 URETEROLITHIASIS: Primary | ICD-10-CM

## 2025-08-09 LAB
ABSOLUTE EOSINOPHIL (OHS): 0.09 K/UL
ABSOLUTE MONOCYTE (OHS): 0.56 K/UL (ref 0.3–1)
ABSOLUTE NEUTROPHIL COUNT (OHS): 9.38 K/UL (ref 1.8–7.7)
ALBUMIN SERPL BCP-MCNC: 3.9 G/DL (ref 3.5–5.2)
ALP SERPL-CCNC: 95 UNIT/L (ref 40–150)
ALT SERPL W/O P-5'-P-CCNC: 18 UNIT/L (ref 0–55)
ANION GAP (OHS): 13 MMOL/L (ref 8–16)
AST SERPL-CCNC: 25 UNIT/L (ref 0–50)
BACTERIA #/AREA URNS AUTO: ABNORMAL /HPF
BASOPHILS # BLD AUTO: 0.03 K/UL
BASOPHILS NFR BLD AUTO: 0.3 %
BILIRUB SERPL-MCNC: 0.8 MG/DL (ref 0.1–1)
BILIRUB UR QL STRIP.AUTO: NEGATIVE
BUN SERPL-MCNC: 22 MG/DL (ref 6–30)
BUN SERPL-MCNC: 24 MG/DL (ref 8–23)
CALCIUM SERPL-MCNC: 9.6 MG/DL (ref 8.7–10.5)
CHLORIDE SERPL-SCNC: 104 MMOL/L (ref 95–110)
CHLORIDE SERPL-SCNC: 104 MMOL/L (ref 95–110)
CLARITY UR: CLEAR
CO2 SERPL-SCNC: 24 MMOL/L (ref 23–29)
COLOR UR AUTO: YELLOW
CREAT SERPL-MCNC: 1.5 MG/DL (ref 0.5–1.4)
CREAT SERPL-MCNC: 1.5 MG/DL (ref 0.5–1.4)
ERYTHROCYTE [DISTWIDTH] IN BLOOD BY AUTOMATED COUNT: 13 % (ref 11.5–14.5)
GFR SERPLBLD CREATININE-BSD FMLA CKD-EPI: 37 ML/MIN/1.73/M2
GLUCOSE SERPL-MCNC: 193 MG/DL (ref 70–110)
GLUCOSE SERPL-MCNC: 194 MG/DL (ref 70–110)
GLUCOSE UR QL STRIP: NEGATIVE
HCT VFR BLD AUTO: 38 % (ref 37–48.5)
HCT VFR BLD CALC: 37 %PCV (ref 36–54)
HCV AB SERPL QL IA: NORMAL
HGB BLD-MCNC: 12.6 GM/DL (ref 12–16)
HGB UR QL STRIP: ABNORMAL
HIV 1+2 AB+HIV1 P24 AG SERPL QL IA: NORMAL
HYALINE CASTS UR QL AUTO: 1 /LPF (ref 0–1)
IMM GRANULOCYTES # BLD AUTO: 0.04 K/UL (ref 0–0.04)
IMM GRANULOCYTES NFR BLD AUTO: 0.4 % (ref 0–0.5)
KETONES UR QL STRIP: ABNORMAL
LEUKOCYTE ESTERASE UR QL STRIP: ABNORMAL
LIPASE SERPL-CCNC: 18 U/L (ref 4–60)
LYMPHOCYTES # BLD AUTO: 1.23 K/UL (ref 1–4.8)
MCH RBC QN AUTO: 29.2 PG (ref 27–31)
MCHC RBC AUTO-ENTMCNC: 33.2 G/DL (ref 32–36)
MCV RBC AUTO: 88 FL (ref 82–98)
MICROSCOPIC COMMENT: ABNORMAL
NITRITE UR QL STRIP: NEGATIVE
NUCLEATED RBC (/100WBC) (OHS): 0 /100 WBC
OHS QRS DURATION: 78 MS
OHS QRS DURATION: 88 MS
OHS QTC CALCULATION: 397 MS
OHS QTC CALCULATION: 466 MS
PH UR STRIP: 6 [PH]
PLATELET # BLD AUTO: 251 K/UL (ref 150–450)
PMV BLD AUTO: 9.8 FL (ref 9.2–12.9)
POC IONIZED CALCIUM: 1.13 MMOL/L (ref 1.06–1.42)
POC TCO2 (MEASURED): 24 MMOL/L (ref 23–29)
POTASSIUM BLD-SCNC: 3.3 MMOL/L (ref 3.5–5.1)
POTASSIUM SERPL-SCNC: 3.4 MMOL/L (ref 3.5–5.1)
PROT SERPL-MCNC: 7.2 GM/DL (ref 6–8.4)
PROT UR QL STRIP: NEGATIVE
RBC # BLD AUTO: 4.31 M/UL (ref 4–5.4)
RBC #/AREA URNS AUTO: 21 /HPF (ref 0–4)
RELATIVE EOSINOPHIL (OHS): 0.8 %
RELATIVE LYMPHOCYTE (OHS): 10.9 % (ref 18–48)
RELATIVE MONOCYTE (OHS): 4.9 % (ref 4–15)
RELATIVE NEUTROPHIL (OHS): 82.7 % (ref 38–73)
SAMPLE: ABNORMAL
SODIUM BLD-SCNC: 141 MMOL/L (ref 136–145)
SODIUM SERPL-SCNC: 141 MMOL/L (ref 136–145)
SP GR UR STRIP: 1.02
SQUAMOUS #/AREA URNS AUTO: 2 /HPF
UROBILINOGEN UR STRIP-ACNC: NEGATIVE EU/DL
WBC # BLD AUTO: 11.33 K/UL (ref 3.9–12.7)
WBC #/AREA URNS AUTO: 6 /HPF (ref 0–5)

## 2025-08-09 PROCEDURE — 93005 ELECTROCARDIOGRAM TRACING: CPT | Mod: HCNC

## 2025-08-09 PROCEDURE — 80047 BASIC METABLC PNL IONIZED CA: CPT | Mod: HCNC

## 2025-08-09 PROCEDURE — 86803 HEPATITIS C AB TEST: CPT | Mod: HCNC | Performed by: PHYSICIAN ASSISTANT

## 2025-08-09 PROCEDURE — 63600175 PHARM REV CODE 636 W HCPCS: Mod: HCNC | Performed by: STUDENT IN AN ORGANIZED HEALTH CARE EDUCATION/TRAINING PROGRAM

## 2025-08-09 PROCEDURE — 80053 COMPREHEN METABOLIC PANEL: CPT | Mod: HCNC | Performed by: STUDENT IN AN ORGANIZED HEALTH CARE EDUCATION/TRAINING PROGRAM

## 2025-08-09 PROCEDURE — 83690 ASSAY OF LIPASE: CPT | Mod: HCNC | Performed by: STUDENT IN AN ORGANIZED HEALTH CARE EDUCATION/TRAINING PROGRAM

## 2025-08-09 PROCEDURE — 96375 TX/PRO/DX INJ NEW DRUG ADDON: CPT | Mod: HCNC

## 2025-08-09 PROCEDURE — 99285 EMERGENCY DEPT VISIT HI MDM: CPT | Mod: 25,HCNC

## 2025-08-09 PROCEDURE — 81001 URINALYSIS AUTO W/SCOPE: CPT | Mod: HCNC | Performed by: STUDENT IN AN ORGANIZED HEALTH CARE EDUCATION/TRAINING PROGRAM

## 2025-08-09 PROCEDURE — 85025 COMPLETE CBC W/AUTO DIFF WBC: CPT | Mod: HCNC | Performed by: STUDENT IN AN ORGANIZED HEALTH CARE EDUCATION/TRAINING PROGRAM

## 2025-08-09 PROCEDURE — 96361 HYDRATE IV INFUSION ADD-ON: CPT | Mod: HCNC

## 2025-08-09 PROCEDURE — 87389 HIV-1 AG W/HIV-1&-2 AB AG IA: CPT | Mod: HCNC | Performed by: PHYSICIAN ASSISTANT

## 2025-08-09 PROCEDURE — 96374 THER/PROPH/DIAG INJ IV PUSH: CPT | Mod: HCNC

## 2025-08-09 PROCEDURE — 25500020 PHARM REV CODE 255: Mod: HCNC | Performed by: STUDENT IN AN ORGANIZED HEALTH CARE EDUCATION/TRAINING PROGRAM

## 2025-08-09 PROCEDURE — 93010 ELECTROCARDIOGRAM REPORT: CPT | Mod: HCNC,76,, | Performed by: INTERNAL MEDICINE

## 2025-08-09 RX ORDER — ONDANSETRON HYDROCHLORIDE 2 MG/ML
4 INJECTION, SOLUTION INTRAVENOUS
Status: COMPLETED | OUTPATIENT
Start: 2025-08-09 | End: 2025-08-09

## 2025-08-09 RX ORDER — KETOROLAC TROMETHAMINE 10 MG/1
10 TABLET, FILM COATED ORAL EVERY 6 HOURS PRN
Qty: 20 TABLET | Refills: 0 | Status: SHIPPED | OUTPATIENT
Start: 2025-08-09 | End: 2025-08-14

## 2025-08-09 RX ORDER — KETOROLAC TROMETHAMINE 10 MG/1
10 TABLET, FILM COATED ORAL EVERY 6 HOURS PRN
Qty: 20 TABLET | Refills: 0 | Status: SHIPPED | OUTPATIENT
Start: 2025-08-09 | End: 2025-08-09

## 2025-08-09 RX ORDER — OXYCODONE HYDROCHLORIDE 5 MG/1
5 TABLET ORAL EVERY 12 HOURS PRN
Qty: 6 TABLET | Refills: 0 | Status: SHIPPED | OUTPATIENT
Start: 2025-08-09 | End: 2025-08-12

## 2025-08-09 RX ORDER — TAMSULOSIN HYDROCHLORIDE 0.4 MG/1
0.4 CAPSULE ORAL DAILY
Qty: 7 CAPSULE | Refills: 0 | Status: SHIPPED | OUTPATIENT
Start: 2025-08-09 | End: 2026-08-09

## 2025-08-09 RX ORDER — TAMSULOSIN HYDROCHLORIDE 0.4 MG/1
0.4 CAPSULE ORAL
Status: DISCONTINUED | OUTPATIENT
Start: 2025-08-09 | End: 2025-08-09

## 2025-08-09 RX ORDER — ONDANSETRON 4 MG/1
4 TABLET, ORALLY DISINTEGRATING ORAL EVERY 8 HOURS PRN
Qty: 9 TABLET | Refills: 0 | Status: SHIPPED | OUTPATIENT
Start: 2025-08-09 | End: 2025-08-12

## 2025-08-09 RX ORDER — MORPHINE SULFATE 4 MG/ML
4 INJECTION, SOLUTION INTRAMUSCULAR; INTRAVENOUS
Refills: 0 | Status: COMPLETED | OUTPATIENT
Start: 2025-08-09 | End: 2025-08-09

## 2025-08-09 RX ORDER — TAMSULOSIN HYDROCHLORIDE 0.4 MG/1
0.4 CAPSULE ORAL DAILY
Qty: 7 CAPSULE | Refills: 0 | Status: SHIPPED | OUTPATIENT
Start: 2025-08-09 | End: 2025-08-09

## 2025-08-09 RX ORDER — KETOROLAC TROMETHAMINE 30 MG/ML
15 INJECTION, SOLUTION INTRAMUSCULAR; INTRAVENOUS
Status: COMPLETED | OUTPATIENT
Start: 2025-08-09 | End: 2025-08-09

## 2025-08-09 RX ADMIN — KETOROLAC TROMETHAMINE 15 MG: 30 INJECTION, SOLUTION INTRAMUSCULAR; INTRAVENOUS at 05:08

## 2025-08-09 RX ADMIN — SODIUM CHLORIDE, POTASSIUM CHLORIDE, SODIUM LACTATE AND CALCIUM CHLORIDE 1000 ML: 600; 310; 30; 20 INJECTION, SOLUTION INTRAVENOUS at 05:08

## 2025-08-09 RX ADMIN — MORPHINE SULFATE 4 MG: 4 INJECTION INTRAVENOUS at 05:08

## 2025-08-09 RX ADMIN — ONDANSETRON 4 MG: 2 INJECTION INTRAMUSCULAR; INTRAVENOUS at 05:08

## 2025-08-09 RX ADMIN — IOHEXOL 75 ML: 350 INJECTION, SOLUTION INTRAVENOUS at 06:08

## 2025-08-10 LAB — HOLD SPECIMEN: NORMAL

## 2025-08-12 LAB — HOLD SPECIMEN: NORMAL

## 2025-08-27 ENCOUNTER — TELEPHONE (OUTPATIENT)
Dept: ENDOCRINOLOGY | Facility: CLINIC | Age: 70
End: 2025-08-27
Payer: MEDICARE